# Patient Record
Sex: MALE | Race: WHITE | Employment: OTHER | URBAN - METROPOLITAN AREA
[De-identification: names, ages, dates, MRNs, and addresses within clinical notes are randomized per-mention and may not be internally consistent; named-entity substitution may affect disease eponyms.]

---

## 2017-01-17 DIAGNOSIS — Z96.649 MECHANICAL COMPLICATION OF PROSTHETIC HIP IMPLANT (H): Primary | ICD-10-CM

## 2017-01-17 DIAGNOSIS — T84.098A MECHANICAL COMPLICATION OF PROSTHETIC HIP IMPLANT (H): Primary | ICD-10-CM

## 2017-01-19 ENCOUNTER — HOSPITAL ENCOUNTER (OUTPATIENT)
Dept: CT IMAGING | Facility: CLINIC | Age: 57
Discharge: HOME | End: 2017-01-19
Attending: ORTHOPAEDIC SURGERY | Admitting: ORTHOPAEDIC SURGERY
Payer: MEDICARE

## 2017-01-19 DIAGNOSIS — T84.098A MECHANICAL COMPLICATION OF PROSTHETIC HIP IMPLANT (H): ICD-10-CM

## 2017-01-19 DIAGNOSIS — Z96.649 MECHANICAL COMPLICATION OF PROSTHETIC HIP IMPLANT (H): ICD-10-CM

## 2017-01-19 PROCEDURE — 72192 CT PELVIS W/O DYE: CPT | Mod: TC

## 2017-03-03 ENCOUNTER — TELEPHONE (OUTPATIENT)
Dept: FAMILY MEDICINE | Facility: CLINIC | Age: 57
End: 2017-03-03

## 2017-03-03 NOTE — TELEPHONE ENCOUNTER
Reason for call:  Form  Reason for Call:  Form, our goal is to have forms completed with 72 hours, however, some forms may require a visit or additional information.    Type of letter, form or note:  medical    Who is the form from?: *[facility] diabetic form    Where did the form come from: form was faxed in    What clinic location was the form placed at?: [facility] - [phone]    Where the form was placed:  in box     What number is listed as a contact on the form?: [phone]       Additional comments: Fax to [phone]    Call taken on 11/8/2016 at 3:08 PM by Christy Harkins

## 2017-03-17 ASSESSMENT — HOOS S4: HOW SEVERE IS YOUR HIP JOINT STIFFNESS AFTER FIRST WAKENING IN THE MORNING?: MILD

## 2017-03-17 ASSESSMENT — ACTIVITIES OF DAILY LIVING (ADL)
ADL_SUM: 24
ADL_MEAN: 1.41
ADL_SUBSCALE_SCORE: 64.7

## 2017-03-27 ENCOUNTER — OFFICE VISIT (OUTPATIENT)
Dept: ORTHOPEDICS | Facility: CLINIC | Age: 57
End: 2017-03-27

## 2017-03-27 VITALS — HEIGHT: 74 IN | BODY MASS INDEX: 23.59 KG/M2 | WEIGHT: 183.8 LBS

## 2017-03-27 DIAGNOSIS — S32.811G: Primary | ICD-10-CM

## 2017-03-27 NOTE — PROGRESS NOTES
Dx:   1. Chondrosarcoma left pelvis  2. Multiple Sclerosis  3. DM     Surgeries:   1. 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 Tavo Gomez.   2. March 12, 2008, removal of fractured left pelvis allograft and reimplantation of second left hemipelvis allograft with internal fixation and revision left total hip arthroplasty, Dr Gage  3. 9/25/2015, Revision failed L hemipelvis allograft/MINNIE composite to custom R hemipelvis and MINNIE prosthesis (Biomet pelvis, vit E poly liner, CoChr 36mm +9 head) with Sacral ala and pubic symphysis fixation.     HISTORY OF PRESENT ILLNESS:  Mr. Vaz returns 1.5 years after undergoing revision of his failed left hemipelvis allograft total hip composite to a custom right hemipelvis, combined with total hip prosthesis. Continues to be TTWB with crutches. He obtained repeat CT scans of the pelvis in Sept 2017 and January 2017 which revealed loosening of the transiliac screws, broken pubic screws and interval disruption of the pubic footing component.  He has been minimally active, mainly at home but does go out to do his own grocery shopping. Uses a wheelchair for many activities. Notes some posterior stiffness.  He also notes movement anteriorly, but denies any pain.   He denies any new n/t/weakness. He presents today to discuss CT scan and plans going forward, including surgical management.       PHYSICAL EXAMINATION:    His extensile incision extending from the sacral ala to the pubic symphysis has completely healed.  Increased soft tissue bulk noted along the supra-iliac area.  There is no evidence of drainage or erythema, no ongoing edema.  He tolerates hip ROM without pain. We are able to reproduce a clicking/movement sensation over the left pubis with hip manipulation. Hip ROM 0-100. IR 20, ER 30. Distal sensation decreased compared to contralateral side in stocking glove distribution. Palpable PT pulse. Intact strength in ehl/fhl/ta/gsc.       Radiographs were obtained today demonstrating increased radiolucency along the transiliac screws, particularly lateral to the right SI joint.  Interval backing out of the pubic fixation screws, which is broken.  Lucency along the sacral bone-ilium prosthesis interface.      CT scans show significant hardware artifact.  Lucency noted along transiliac screws and sacral-ilium interface.     Assessment/Plan:  Aden Vaz is a 55 year old male 1.5 year s/p massive reconstruction of the left hemipelvis using a custom device secondary to prior failed allograft reconstructions of chondrosarcoma resection. Now with broken screws concerning for ongoing motion at his implant/bone interface.    We discussed at length with Mr Vaz regarding our recommendations to go forward with operative management prior to worsening, catastrophic failure of implant.  We discussed risks, benefits, alternatives to surgical management included but not limited to infection, blood loss, damage to surrounding anatomic structures, and complications that could result in subsequent amputation, and patient is in agreement with proceeding with surgery.  Surgery would be around mid-May once the custom prosthesis is completed.  Surgical intervention would consist of the followin.  Replacement of three transiliac screws to upsized, solid screws   2.  Replace pubic footing component with extension across the pubic symphysis   3.  Access to the pubic footing component fixation/screws requires a hip dislcoation   4.  Possible scar revision with debulking of the supra-iliac soft tissue, if possible    -continue with crutches and TTWB  -Minimize activities and avoid activity/motion that reproduces his sensation of movement within the pelvis  -will call to arrange specifics regarding OR scheduling    Ras Sagastume MD  Orthopaedic Surgery PGY-4  [phone]      Total Time = 25 min, 50% of which was spent in counseling and coordination of  care as documented above.

## 2017-03-27 NOTE — LETTER
3/27/2017       RE: Aden Vaz  [address]   [place] [address]     Dear Colleague,    Thank you for referring your patient, Aden Vaz, to the [facility] at [facility]. Please see a copy of my visit note below.    Dx:   1. Chondrosarcoma left pelvis  2. Multiple Sclerosis  3. DM     Surgeries:   1. 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 Tavo Gomez.   2. March 12, 2008, removal of fractured left pelvis allograft and reimplantation of second left hemipelvis allograft with internal fixation and revision left total hip arthroplasty, Dr Gage  3. 9/25/2015, Revision failed L hemipelvis allograft/MINNIE composite to custom R hemipelvis and MINNIE prosthesis (Biomet pelvis, vit E poly liner, CoChr 36mm +9 head) with Sacral ala and pubic symphysis fixation.     HISTORY OF PRESENT ILLNESS:  Mr. Vaz returns 1.5 years after undergoing revision of his failed left hemipelvis allograft total hip composite to a custom right hemipelvis, combined with total hip prosthesis. Continues to be TTWB with crutches. He obtained repeat CT scans of the pelvis in Sept 2017 and January 2017 which revealed loosening of the transiliac screws, broken pubic screws and interval disruption of the pubic footing component.  He has been minimally active, mainly at home but does go out to do his own grocery shopping. Uses a wheelchair for many activities. Notes some posterior stiffness.  He also notes movement anteriorly, but denies any pain.   He denies any new n/t/weakness. He presents today to discuss CT scan and plans going forward, including surgical management.       PHYSICAL EXAMINATION:    His extensile incision extending from the sacral ala to the pubic symphysis has completely healed.  Increased soft tissue bulk noted along the supra-iliac area.  There is no evidence of drainage or erythema, no ongoing edema.  He  tolerates hip ROM without pain. We are able to reproduce a clicking/movement sensation over the left pubis with hip manipulation. Hip ROM 0-100. IR 20, ER 30. Distal sensation decreased compared to contralateral side in stocking glove distribution. Palpable PT pulse. Intact strength in ehl/fhl/ta/gsc.      Radiographs were obtained today demonstrating increased radiolucency along the transiliac screws, particularly lateral to the right SI joint.  Interval backing out of the pubic fixation screws, which is broken.  Lucency along the sacral bone-ilium prosthesis interface.      CT scans show significant hardware artifact.  Lucency noted along transiliac screws and sacral-ilium interface.     Assessment/Plan:  Aden Vaz is a 55 year old male 1.5 year s/p massive reconstruction of the left hemipelvis using a custom device secondary to prior failed allograft reconstructions of chondrosarcoma resection. Now with broken screws concerning for ongoing motion at his implant/bone interface.    We discussed at length with Mr Vaz regarding our recommendations to go forward with operative management prior to worsening, catastrophic failure of implant.  We discussed risks, benefits, alternatives to surgical management included but not limited to infection, blood loss, damage to surrounding anatomic structures, and complications that could result in subsequent amputation, and patient is in agreement with proceeding with surgery.  Surgery would be around mid-May once the custom prosthesis is completed.  Surgical intervention would consist of the followin.  Replacement of three transiliac screws to upsized, solid screws   2.  Replace pubic footing component with extension across the pubic symphysis   3.  Access to the pubic footing component fixation/screws requires a hip dislcoation   4.  Possible scar revision with debulking of the supra-iliac soft tissue, if possible    -continue with crutches and TTWB  -Minimize  activities and avoid activity/motion that reproduces his sensation of movement within the pelvis  -will call to arrange specifics regarding OR scheduling    Ras Sagastume MD  Orthopaedic Surgery PGY-4  [phone]      Total Time = 25 min, 50% of which was spent in counseling and coordination of care as documented above.

## 2017-03-27 NOTE — NURSING NOTE
"Reason For Visit:   Chief Complaint   Patient presents with     Results     Pt is here today to F/u on CT Scan from Jan. of his Pelvis and Same day XR of his Pelivs.            Ht 1.886 m (6' 2.25\")  Wt 83.4 kg (183 lb 12.8 oz)  BMI 23.44 kg/m2              Current Outpatient Prescriptions   Medication     insulin glargine (LANTUS SOLOSTAR) 100 UNIT/ML injection     simvastatin (ZOCOR) 80 MG tablet     amLODIPine (NORVASC) 5 MG tablet     insulin lispro (HUMALOG) 100 UNIT/ML VIAL     B-D U/F insulin pen needle     blood glucose monitoring (NO BRAND SPECIFIED) test strip     order for DME     ACE NOT PRESCRIBED, INTENTIONAL,     amoxicillin (AMOXIL) 500 MG tablet     ORDER FOR DME     aspirin 81 MG tablet     ORDER FOR DME     CALCIUM + D 600-200 MG-UNIT PO TABS     VITAMIN D3 2000 UNIT PO CAPS     No current facility-administered medications for this visit.        Allergies   Allergen Reactions     No Known Drug Allergies        Christy Badillo C.M.A.             "

## 2017-03-27 NOTE — MR AVS SNAPSHOT
After Visit Summary   3/27/2017    Aden Vaz    MRN: [id number]           Patient Information     Date Of Birth          1960        Visit Information        Provider Department      3/27/2017 2:15 PM Payam Gage MD [facility]        Today's Diagnoses     Multiple closed fractures of pelvis with unstable disruption of pelvic [place] with delayed healing, subsequent encounter    -  1       Follow-ups after your visit        Your next 10 appointments already scheduled     Sep 05, 2017  1:45 PM CDT   (Arrive by 1:30 PM)   MR BRAIN W/O & W CONTRAST with [facility] [facility] ([facility])    [address] [address] [phone]           Take your medicines as usual, unless your doctor tells you not to. Bring a list of your current medicines to your exam (including vitamins, minerals and over-the-counter drugs).  You will be given intravenous contrast for this exam. To prepare:   The day before your exam, drink extra fluids at least six 8-ounce glasses (unless your doctor tells you to restrict your fluids).   Have a blood test (creatinine test) within 30 days of your exam. Go to your clinic or Diagnostic Imaging Department for this test.  The MRI machine uses a strong magnet. Please wear clothes without metal (snaps, zippers). A sweatsuit works well, or we may give you a hospital gown.  Please remove any body piercings and hair extensions before you arrive. You will also remove watches, jewelry, hairpins, wallets, dentures, partial dental plates and hearing aids. You may wear contact lenses, and you may be able to wear your rings. We have a safe place to keep your personal items, but it is safer to leave them at home.   **IMPORTANT** THE INSTRUCTIONS BELOW ARE ONLY FOR THOSE PATIENTS WHO HAVE BEEN TOLD THEY WILL RECEIVE SEDATION OR GENERAL ANESTHESIA DURING THEIR MRI PROCEDURE:  IF YOU WILL RECEIVE  SEDATION (take medicine to help you relax during your exam):   You must get the medicine from your doctor before you arrive. Bring the medicine to the exam. Do not take it at home.   Arrive one hour early. Bring someone who can take you home after the test. Your medicine will make you sleepy. After the exam, you may not drive, take a bus or take a taxi by yourself.   No eating 8 hours before your exam. You may have clear liquids up until 4 hours before your exam. (Clear liquids include water, clear tea, black coffee and fruit juice without pulp.)  IF YOU WILL RECEIVE ANESTHESIA (be asleep for your exam):   Arrive 1 1/2 hours early. Bring someone who can take you home after the test. You may not drive, take a bus or take a taxi by yourself.   No eating 8 hours before your exam. You may have clear liquids up until 4 hours before your exam. (Clear liquids include water, clear tea, black coffee and fruit juice without pulp.)  Please call the Imaging Department at your exam site with any questions.            Sep 05, 2017  3:00 PM CDT   (Arrive by 2:45 PM)   Return Multiple Sclerosis with Isai Kathleen MD   [facility] Multiple [facility]clerosis ([facility])    [address] [place] [address] [phone]              Who to contact     Please call your clinic at [phone] to:    Ask questions about your health    Make or cancel appointments    Discuss your medicines    Learn about your test results    Speak to your doctor   If you have compliments or concerns about an experience at your clinic, or if you wish to file a complaint, please contact [facility] Patient Relations at [phone] or email us at [email or web address]         Additional Information About Your Visit        [employer] Information     [employer] gives you secure access to your electronic health record. If you see a primary care provider, you can also send messages  "to your care team and make appointments. If you have questions, please call your primary care clinic.  If you do not have a primary care provider, please call [phone] and they will assist you.      [employer] is an electronic gateway that provides easy, online access to your medical records. With [employer], you can request a clinic appointment, read your test results, renew a prescription or communicate with your care team.     To access your existing account, please contact your [facility] or call [phone] for assistance.        Care EveryWhere ID     This is your Care EveryWhere ID. This could be used by other organizations to access your [facility] medical records  [id number]        Your Vitals Were     Height BMI (Body Mass Index)                1.886 m (6' 2.25\") 23.44 kg/m2           Blood Pressure from Last 3 Encounters:   10/04/16 108/68   09/06/16 143/86   08/05/16 98/60    Weight from Last 3 Encounters:   03/27/17 83.4 kg (183 lb 12.8 oz)   10/04/16 85.3 kg (188 lb 1.6 oz)   09/26/16 86.7 kg (191 lb 1.6 oz)               Primary Care Provider Office Phone # Fax #    Melita Painter [phone]       [facility] [address] [facility] [address]        Thank you!     Thank you for choosing [facility]  for your care. Our goal is always to provide you with excellent care. Hearing back from our patients is one way we can continue to improve our services. Please take a few minutes to complete the written survey that you may receive in the mail after your visit with us. Thank you!             Your Updated Medication List - Protect others around you: Learn how to safely use, store and throw away your medicines at www.disposemymeds.org.          This list is accurate as of: 3/27/17 11:59 PM.  Always use your most recent med list.                   Brand Name Dispense Instructions for use    ACE NOT PRESCRIBED (INTENTIONAL)     0 each "    ACE Inhibitor not prescribed due to Other:       amLODIPine 5 MG tablet    NORVASC    90 tablet    Take 1 tablet (5 mg) by mouth every morning       amoxicillin 500 MG tablet    AMOXIL    8 tablet    Take 4 tabs ( 2 grams) by mouth 1 hour before dental work or any invasive procedure       aspirin 81 MG tablet      Take  by mouth daily.       B-D U/F 31G X 5 MM   Generic drug:  insulin pen needle     300 each    USE 4 DAILY WITH NOVOLOG AND LANTUS PENS AS DIRECTED       blood glucose monitoring test strip    no brand specified    4 Box    1 strip by In Vitro route 4 times daily (before meals and nightly)       calcium + D 600-200 MG-UNIT Tabs   Generic drug:  calcium carbonate-vitamin D      1 Tablet daily       insulin glargine 100 UNIT/ML injection    LANTUS SOLOSTAR    15 mL    INJECT 9 UNITS AT BEDTIME       insulin lispro 100 UNIT/ML injection    HumaLOG    3 Month    Inject  Subcutaneous 3 times daily (before meals). 10 units before breakfast, 10 units before lunch, 10 units before dinner       * order for DME     1 each    Wheelchair seat cushion       * order for DME     1 Units    Crutch, forearm       order for DME     400 strip    Equipment being ordered: Contour test strips for home glucometer for four times daily testing       simvastatin 80 MG tablet    ZOCOR    45 tablet    TAKE ONE-HALF TABLET BY MOUTH DAILY       vitamin D3 2000 UNITS Caps      1 CAPSULE DAILY       * Notice:  This list has 2 medication(s) that are the same as other medications prescribed for you. Read the directions carefully, and ask your doctor or other care provider to review them with you.

## 2017-03-31 DIAGNOSIS — Z96.649 H/O TOTAL HIP ARTHROPLASTY: Primary | ICD-10-CM

## 2017-03-31 RX ORDER — AMOXICILLIN 500 MG/1
TABLET, FILM COATED ORAL
Qty: 4 TABLET | Refills: 4 | Status: SHIPPED | OUTPATIENT
Start: 2017-03-31 | End: 2017-04-11

## 2017-04-06 NOTE — PROGRESS NOTES
"  SUBJECTIVE:                                                    Aden Vaz is a 56 year old male who presents to clinic today for the following health issues:    FOLLOW UP OF PELVIC SURGERY patient wants opinion on surgery. Had reconstruction of the left hemipelvis last year. A screw is coming loose and is not stable. He has met with his surgeon about a new surgery. He is not sure he would like to go through this at this point. He is concerned that he may be right back at this same point in another year. He feels he is getting around fine and concerned that he may not be if he has another surgery.     MULTIPLE SCLEROSIS has been having flares.  Patient thinks Prevnar 13 gave him an MS flare. He hadn't had a flare for years. Will be seeing his neurologist about new medication for MS. He is feeling some better now. He is very fatigued and wondering if it is due to recent flare. He denies any trouble with appetite. He has no nausea or vomiting. No cough or fever.     Reports that he consider moving to a \"right to die\" state because of his health problems. He denies that he is suicidal and has no intention to hurt himself. He does think at times about what his future holds with his issues.       Diabetes Follow-up    Patient is checking blood sugars: twice daily.    Blood sugar testing frequency justification: Patient modifying lifestyle changes (diet, exercise) with blood sugars  Results are as follows:         am - 130-140         suppertime -     Diabetic concerns: None     Symptoms of hypoglycemia (low blood sugar): shaky, disoriented, sweaty, concentrating.     Paresthesias (numbness or burning in feet) or sores: Yes Left foot- Could be from MS.     Date of last diabetic eye exam: 7/26/2016     Hyperlipidemia Follow-Up      Rate your low fat/cholesterol diet?: good    Taking statin?  Yes, no muscle aches from statin    Other lipid medications/supplements?:  none     Hypertension " Follow-up      Outpatient blood pressures are not being checked.    Low Salt Diet: no added salt         Amount of exercise or physical activity: 5-7 days/week for an average of 30-45 minutes, exercises for his legs, feet, arms, and hands.    Problems taking medications regularly: No    Medication side effects: none    Diet: diabetic          Problem list and histories reviewed & adjusted, as indicated.  Additional history: as documented    Current Outpatient Prescriptions   Medication Sig Dispense Refill     insulin glargine (LANTUS SOLOSTAR) 100 UNIT/ML injection INJECT 9 UNITS AT BEDTIME 15 mL 1     simvastatin (ZOCOR) 80 MG tablet TAKE ONE-HALF TABLET BY MOUTH DAILY 45 tablet 3     amLODIPine (NORVASC) 5 MG tablet Take 1 tablet (5 mg) by mouth every morning 90 tablet 1     insulin lispro (HUMALOG) 100 UNIT/ML VIAL Inject  Subcutaneous 3 times daily (before meals). 10 units before breakfast, 10 units before lunch, 10 units before dinner 3 Month 1     B-D U/F insulin pen needle USE 4 DAILY WITH NOVOLOG AND LANTUS PENS AS DIRECTED 300 each 5     blood glucose monitoring (NO BRAND SPECIFIED) test strip 1 strip by In Vitro route 4 times daily (before meals and nightly) 4 Box 3     aspirin 81 MG tablet Take  by mouth daily.  3     CALCIUM + D 600-200 MG-UNIT PO TABS 1 Tablet daily       VITAMIN D3 2000 UNIT PO CAPS 1 CAPSULE DAILY       ACE NOT PRESCRIBED, INTENTIONAL, ACE Inhibitor not prescribed due to Other: (Patient not taking: Reported on 4/11/2017) 0 each 0     ORDER FOR DME Crutch, forearm (Patient not taking: Reported on 4/11/2017) 1 Units 0     ORDER FOR DME Wheelchair seat cushion (Patient not taking: Reported on 4/11/2017) 1 each 0     Allergies   Allergen Reactions     No Known Drug Allergies        Reviewed and updated as needed this visit by clinical staff       Reviewed and updated as needed this visit by Provider         ROS:  C: NEGATIVE for fever, chills, change in weight  I: NEGATIVE for worrisome  "rashes, moles or lesions  E/M: NEGATIVE for ear, mouth and throat problems  R: NEGATIVE for significant cough or SOB  CV: NEGATIVE for chest pain, palpitations or peripheral edema  GI: NEGATIVE for nausea, abdominal pain, heartburn, or change in bowel habits  MUSCULOSKELETAL:as above  NEURO: as above.     OBJECTIVE:                                                    /72 (BP Location: Left arm, Patient Position: Chair, Cuff Size: Adult Regular)  Pulse 78  Temp 98  F (36.7  C) (Temporal)  Resp 16  Ht 6' 2\" (1.88 m)  Wt 191 lb 9.6 oz (86.9 kg)  BMI 24.6 kg/m2  Body mass index is 24.6 kg/(m^2).  GENERAL APPEARANCE: alert and no distress  HENT: mouth without ulcers or lesions  NECK: no adenopathy, no asymmetry, masses, or scars and thyroid normal to palpation  RESP: lungs clear to auscultation - no rales, rhonchi or wheezes  CV: regular rates and rhythm, normal S1 S2, no S3 or S4 and no murmur, click or rub  MS: no lower extremity edema.     Diagnostic Test Results:  pending     ASSESSMENT/PLAN:                                                          BMI:   Estimated body mass index is 24.6 kg/(m^2) as calculated from the following:    Height as of this encounter: 6' 2\" (1.88 m).    Weight as of this encounter: 191 lb 9.6 oz (86.9 kg).         1. Type 2 diabetes mellitus without complication, with long-term current use of insulin (H)  Patient is due for labs today.   Will notify of results.   Awaiting results. Dose adjustment as needed.    - Hemoglobin A1c  - Albumin Random Urine Quantitative    2. Multiple sclerosis (H)  Patient with recent flare. He reports that he is doing better at this time.   He still has fatigue and wondering if this is from his flare.   See below.   He will be following up with his neurology about any treatment options for his MS and will have MRI at next visit.     3. Chondrosarcoma (H)  4. Mechanical complication of prosthetic hip implant, subsequent encounter  See above.   He " doesn't wish to have surgery at this time and that seems reasonable.   Recommend that he discuss with Dr. Ggae.   He will consider.   Will forward message to Dr. Gage regarding this.     5. Fatigue, unspecified type  Patient with fatigue and recent MS flare.   Could be related to this but will also check other labs.   Will notify of results.   - Comprehensive metabolic panel  - CBC with platelets differential  - TSH with free T4 reflex    6. HTN, goal below 130/80  Doing well. Well controlled. Tolerating medication.  No change in plan.      7. Hyperlipidemia LDL goal <100  Doing well. Well controlled. Tolerating medication.  No change in plan.            DONALD CALDERON MD, MD  [facility]

## 2017-04-11 ENCOUNTER — OFFICE VISIT (OUTPATIENT)
Dept: FAMILY MEDICINE | Facility: CLINIC | Age: 57
End: 2017-04-11
Payer: COMMERCIAL

## 2017-04-11 VITALS
DIASTOLIC BLOOD PRESSURE: 72 MMHG | RESPIRATION RATE: 16 BRPM | BODY MASS INDEX: 24.59 KG/M2 | HEART RATE: 78 BPM | TEMPERATURE: 98 F | HEIGHT: 74 IN | WEIGHT: 191.6 LBS | SYSTOLIC BLOOD PRESSURE: 112 MMHG

## 2017-04-11 DIAGNOSIS — G35 MULTIPLE SCLEROSIS (H): ICD-10-CM

## 2017-04-11 DIAGNOSIS — Z79.4 TYPE 2 DIABETES MELLITUS WITHOUT COMPLICATION, WITH LONG-TERM CURRENT USE OF INSULIN (H): Primary | ICD-10-CM

## 2017-04-11 DIAGNOSIS — Z96.649 MECHANICAL COMPLICATION OF PROSTHETIC HIP IMPLANT, SUBSEQUENT ENCOUNTER: ICD-10-CM

## 2017-04-11 DIAGNOSIS — E11.9 TYPE 2 DIABETES MELLITUS WITHOUT COMPLICATION, WITH LONG-TERM CURRENT USE OF INSULIN (H): Primary | ICD-10-CM

## 2017-04-11 DIAGNOSIS — I10 HTN, GOAL BELOW 130/80: ICD-10-CM

## 2017-04-11 DIAGNOSIS — R53.83 FATIGUE, UNSPECIFIED TYPE: ICD-10-CM

## 2017-04-11 DIAGNOSIS — E78.5 HYPERLIPIDEMIA LDL GOAL <100: ICD-10-CM

## 2017-04-11 DIAGNOSIS — C41.9 CHONDROSARCOMA (H): ICD-10-CM

## 2017-04-11 DIAGNOSIS — T84.098D MECHANICAL COMPLICATION OF PROSTHETIC HIP IMPLANT, SUBSEQUENT ENCOUNTER: ICD-10-CM

## 2017-04-11 LAB
BASOPHILS # BLD AUTO: 0 10E9/L (ref 0–0.2)
BASOPHILS NFR BLD AUTO: 0.4 %
DIFFERENTIAL METHOD BLD: ABNORMAL
EOSINOPHIL # BLD AUTO: 0 10E9/L (ref 0–0.7)
EOSINOPHIL NFR BLD AUTO: 0.6 %
ERYTHROCYTE [DISTWIDTH] IN BLOOD BY AUTOMATED COUNT: 12.7 % (ref 10–15)
HBA1C MFR BLD: 6.9 % (ref 4.3–6)
HCT VFR BLD AUTO: 39.3 % (ref 40–53)
HGB BLD-MCNC: 13.3 G/DL (ref 13.3–17.7)
LYMPHOCYTES # BLD AUTO: 1.2 10E9/L (ref 0.8–5.3)
LYMPHOCYTES NFR BLD AUTO: 25.2 %
MCH RBC QN AUTO: 29.6 PG (ref 26.5–33)
MCHC RBC AUTO-ENTMCNC: 33.8 G/DL (ref 31.5–36.5)
MCV RBC AUTO: 88 FL (ref 78–100)
MONOCYTES # BLD AUTO: 0.5 10E9/L (ref 0–1.3)
MONOCYTES NFR BLD AUTO: 9.8 %
NEUTROPHILS # BLD AUTO: 3.1 10E9/L (ref 1.6–8.3)
NEUTROPHILS NFR BLD AUTO: 64 %
PLATELET # BLD AUTO: 250 10E9/L (ref 150–450)
RBC # BLD AUTO: 4.49 10E12/L (ref 4.4–5.9)
WBC # BLD AUTO: 4.8 10E9/L (ref 4–11)

## 2017-04-11 PROCEDURE — 82043 UR ALBUMIN QUANTITATIVE: CPT | Performed by: FAMILY MEDICINE

## 2017-04-11 PROCEDURE — 80053 COMPREHEN METABOLIC PANEL: CPT | Performed by: FAMILY MEDICINE

## 2017-04-11 PROCEDURE — 85025 COMPLETE CBC W/AUTO DIFF WBC: CPT | Performed by: FAMILY MEDICINE

## 2017-04-11 PROCEDURE — 84443 ASSAY THYROID STIM HORMONE: CPT | Performed by: FAMILY MEDICINE

## 2017-04-11 PROCEDURE — 36415 COLL VENOUS BLD VENIPUNCTURE: CPT | Performed by: FAMILY MEDICINE

## 2017-04-11 PROCEDURE — 83036 HEMOGLOBIN GLYCOSYLATED A1C: CPT | Performed by: FAMILY MEDICINE

## 2017-04-11 PROCEDURE — 99214 OFFICE O/P EST MOD 30 MIN: CPT | Performed by: FAMILY MEDICINE

## 2017-04-11 ASSESSMENT — PAIN SCALES - GENERAL: PAINLEVEL: MILD PAIN (3)

## 2017-04-11 NOTE — NURSING NOTE
"Chief Complaint   Patient presents with     Diabetes       Initial /72 (BP Location: Left arm, Patient Position: Chair, Cuff Size: Adult Regular)  Pulse 78  Temp 98  F (36.7  C) (Temporal)  Resp 16  Ht 6' 2\" (1.88 m)  Wt 191 lb 9.6 oz (86.9 kg)  BMI 24.6 kg/m2 Estimated body mass index is 24.6 kg/(m^2) as calculated from the following:    Height as of this encounter: 6' 2\" (1.88 m).    Weight as of this encounter: 191 lb 9.6 oz (86.9 kg).  Medication Reconciliation: complete    "

## 2017-04-11 NOTE — Clinical Note
Ulysses Samaniego doesn't want to go through with another pelvic surgery at this point. I recommended that he speak with you but he asked that I send a message to you. He doesn't feel he is prepared to go through that any time soon.  Please let me know what questions you have.  -Melita

## 2017-04-11 NOTE — MR AVS SNAPSHOT
After Visit Summary   4/11/2017    Aden Vaz    MRN: [id number]           Patient Information     Date Of Birth          1960        Visit Information        Provider Department      4/11/2017 3:20 PM Melita Painter MD [facility]        Today's Diagnoses     Multiple sclerosis (H)    -  1    Type 2 diabetes mellitus without complication, with long-term current use of insulin (H)        Chondrosarcoma (H)        Fatigue, unspecified type        Mechanical complication of prosthetic hip implant, subsequent encounter        HTN, goal below 130/80        Hyperlipidemia LDL goal <100           Follow-ups after your visit        Your next 10 appointments already scheduled     Sep 05, 2017  1:45 PM CDT   (Arrive by 1:30 PM)   MR BRAIN W/O & W CONTRAST with [address] [facility] ([facility])    [address] [place] [id number] [phone]           Take your medicines as usual, unless your doctor tells you not to. Bring a list of your current medicines to your exam (including vitamins, minerals and over-the-counter drugs).  You will be given intravenous contrast for this exam. To prepare:   The day before your exam, drink extra fluids at least six 8-ounce glasses (unless your doctor tells you to restrict your fluids).   Have a blood test (creatinine test) within 30 days of your exam. Go to your clinic or Diagnostic Imaging Department for this test.  The MRI machine uses a strong magnet. Please wear clothes without metal (snaps, zippers). A sweatsuit works well, or we may give you a hospital gown.  Please remove any body piercings and hair extensions before you arrive. You will also remove watches, jewelry, hairpins, wallets, dentures, partial dental plates and hearing aids. You may wear contact lenses, and you may be able to wear your rings. We have a safe place to keep your personal items, but it is safer to leave  them at home.   **IMPORTANT** THE INSTRUCTIONS BELOW ARE ONLY FOR THOSE PATIENTS WHO HAVE BEEN TOLD THEY WILL RECEIVE SEDATION OR GENERAL ANESTHESIA DURING THEIR MRI PROCEDURE:  IF YOU WILL RECEIVE SEDATION (take medicine to help you relax during your exam):   You must get the medicine from your doctor before you arrive. Bring the medicine to the exam. Do not take it at home.   Arrive one hour early. Bring someone who can take you home after the test. Your medicine will make you sleepy. After the exam, you may not drive, take a bus or take a taxi by yourself.   No eating 8 hours before your exam. You may have clear liquids up until 4 hours before your exam. (Clear liquids include water, clear tea, black coffee and fruit juice without pulp.)  IF YOU WILL RECEIVE ANESTHESIA (be asleep for your exam):   Arrive 1 1/2 hours early. Bring someone who can take you home after the test. You may not drive, take a bus or take a taxi by yourself.   No eating 8 hours before your exam. You may have clear liquids up until 4 hours before your exam. (Clear liquids include water, clear tea, black coffee and fruit juice without pulp.)  Please call the Imaging Department at your exam site with any questions.            Sep 05, 2017  3:00 PM CDT   (Arrive by 2:45 PM)   Return Multiple Sclerosis with Iasi Kathleen MD   [facility] ([facility])    [address] [place] [id number] [phone]              Who to contact     If you have questions or need follow up information about today's clinic visit or your schedule please contact [facility] directly at [phone].  Normal or non-critical lab and imaging results will be communicated to you by MyChart, letter or phone within 4 business days after the clinic has received the results. If you do not hear from us within 7 days, please contact the clinic through MyChart or phone. If you have a critical  "or abnormal lab result, we will notify you by phone as soon as possible.  Submit refill requests through [employer] or call your pharmacy and they will forward the refill request to us. Please allow 3 business days for your refill to be completed.          Additional Information About Your Visit        [email or web address] Information     [employer] gives you secure access to your electronic health record. If you see a primary care provider, you can also send messages to your care team and make appointments. If you have questions, please call your primary care clinic.  If you do not have a primary care provider, please call [phone] and they will assist you.        Care EveryWhere ID     This is your Care EveryWhere ID. This could be used by other organizations to access your [place] medical records  [id number]        Your Vitals Were     Pulse Temperature Respirations Height BMI (Body Mass Index)       78 98  F (36.7  C) (Temporal) 16 6' 2\" (1.88 m) 24.6 kg/m2        Blood Pressure from Last 3 Encounters:   04/11/17 112/72   10/04/16 108/68   09/06/16 143/86    Weight from Last 3 Encounters:   04/11/17 191 lb 9.6 oz (86.9 kg)   03/27/17 183 lb 12.8 oz (83.4 kg)   10/04/16 188 lb 1.6 oz (85.3 kg)              We Performed the Following     Albumin Random Urine Quantitative     CBC with platelets differential     Comprehensive metabolic panel     Hemoglobin A1c     TSH with free T4 reflex          Today's Medication Changes          These changes are accurate as of: 4/11/17  4:18 PM.  If you have any questions, ask your nurse or doctor.               Stop taking these medicines if you haven't already. Please contact your care team if you have questions.     amoxicillin 500 MG tablet   Commonly known as:  AMOXIL   Stopped by:  Melita Painter MD                    Primary Care Provider Office Phone # Fax #    Melita Painter [phone]       [facility] [address] [facility] [address]      "   Thank you!     Thank you for choosing [facility]  for your care. Our goal is always to provide you with excellent care. Hearing back from our patients is one way we can continue to improve our services. Please take a few minutes to complete the written survey that you may receive in the mail after your visit with us. Thank you!             Your Updated Medication List - Protect others around you: Learn how to safely use, store and throw away your medicines at www.disposemymeds.org.          This list is accurate as of: 4/11/17  4:18 PM.  Always use your most recent med list.                   Brand Name Dispense Instructions for use    ACE NOT PRESCRIBED (INTENTIONAL)     0 each    ACE Inhibitor not prescribed due to Other:       amLODIPine 5 MG tablet    NORVASC    90 tablet    Take 1 tablet (5 mg) by mouth every morning       aspirin 81 MG tablet      Take  by mouth daily.       B-D U/F 31G X 5 MM   Generic drug:  insulin pen needle     300 each    USE 4 DAILY WITH NOVOLOG AND LANTUS PENS AS DIRECTED       blood glucose monitoring test strip    no brand specified    4 Box    1 strip by In Vitro route 4 times daily (before meals and nightly)       calcium + D 600-200 MG-UNIT Tabs   Generic drug:  calcium carbonate-vitamin D      1 Tablet daily       insulin glargine 100 UNIT/ML injection    LANTUS SOLOSTAR    15 mL    INJECT 9 UNITS AT BEDTIME       insulin lispro 100 UNIT/ML injection    HumaLOG    3 Month    Inject  Subcutaneous 3 times daily (before meals). 10 units before breakfast, 10 units before lunch, 10 units before dinner       * order for DME     1 each    Wheelchair seat cushion       * order for DME     1 Units    Crutch, forearm       simvastatin 80 MG tablet    ZOCOR    45 tablet    TAKE ONE-HALF TABLET BY MOUTH DAILY       vitamin D3 2000 UNITS Caps      1 CAPSULE DAILY       * Notice:  This list has 2 medication(s) that are the same as other medications prescribed for you. Read the  directions carefully, and ask your doctor or other care provider to review them with you.

## 2017-04-12 LAB
ALBUMIN SERPL-MCNC: 3.8 G/DL (ref 3.4–5)
ALP SERPL-CCNC: 100 U/L (ref 40–150)
ALT SERPL W P-5'-P-CCNC: 50 U/L (ref 0–70)
ANION GAP SERPL CALCULATED.3IONS-SCNC: 7 MMOL/L (ref 3–14)
AST SERPL W P-5'-P-CCNC: 32 U/L (ref 0–45)
BILIRUB SERPL-MCNC: 0.4 MG/DL (ref 0.2–1.3)
BUN SERPL-MCNC: 14 MG/DL (ref 7–30)
CALCIUM SERPL-MCNC: 9.2 MG/DL (ref 8.5–10.1)
CHLORIDE SERPL-SCNC: 102 MMOL/L (ref 94–109)
CO2 SERPL-SCNC: 27 MMOL/L (ref 20–32)
CREAT SERPL-MCNC: 0.96 MG/DL (ref 0.66–1.25)
CREAT UR-MCNC: 19 MG/DL
GFR SERPL CREATININE-BSD FRML MDRD: 81 ML/MIN/1.7M2
GLUCOSE SERPL-MCNC: 183 MG/DL (ref 70–99)
MICROALBUMIN UR-MCNC: <5 MG/L
MICROALBUMIN/CREAT UR: NORMAL MG/G CR (ref 0–17)
POTASSIUM SERPL-SCNC: 4.3 MMOL/L (ref 3.4–5.3)
PROT SERPL-MCNC: 7.8 G/DL (ref 6.8–8.8)
SODIUM SERPL-SCNC: 136 MMOL/L (ref 133–144)
TSH SERPL DL<=0.005 MIU/L-ACNC: 1.6 MU/L (ref 0.4–4)

## 2017-05-18 DIAGNOSIS — I10 HYPERTENSION GOAL BP (BLOOD PRESSURE) < 130/80: ICD-10-CM

## 2017-05-18 NOTE — TELEPHONE ENCOUNTER
amLODIPine (NORVASC) 5 MG tablet      Last Written Prescription Date: 11-21-16  Last Fill Quantity:90, # refills: 1  Last Office Visit with G, P or [facility] prescribing provider: 04-11-17       Potassium   Date Value Ref Range Status   04/11/2017 4.3 3.4 - 5.3 mmol/L Final     Creatinine   Date Value Ref Range Status   04/11/2017 0.96 0.66 - 1.25 mg/dL Final     BP Readings from Last 3 Encounters:   04/11/17 112/72   10/04/16 108/68   09/06/16 143/86

## 2017-05-19 RX ORDER — AMLODIPINE BESYLATE 5 MG/1
5 TABLET ORAL EVERY MORNING
Qty: 90 TABLET | Refills: 3 | Status: SHIPPED | OUTPATIENT
Start: 2017-05-19 | End: 2018-04-24 | Stop reason: ALTCHOICE

## 2017-05-19 NOTE — TELEPHONE ENCOUNTER
Prescription approved per [facility] Refill Protocol.  Katina Ladd, RN, BSN

## 2017-06-23 NOTE — PROGRESS NOTES
SUBJECTIVE:                                                    Aden Vaz is a 56 year old male who presents to clinic today for the following health issues:      Concern - Mole Evaluation  Onset: Noticed a couple months ago    Description:   Small mole Left shoulder. Light brown in color. Also noticed some bumps that are around it. Those come an go.     Intensity: mild, Sometimes a burning sensation. Not sure if it is his MS Though    Progression of Symptoms:  same    Accompanying Signs & Symptoms:  No    Previous history of similar problem:   No    Precipitating factors:   Worsened by: No    Alleviating factors:  Improved by: No    Therapies Tried and outcome: Moisturizer.  No personal history of skin cancer.   Family history - sister.         Problem list and histories reviewed & adjusted, as indicated.  Additional history: as documented    BP Readings from Last 3 Encounters:   06/26/17 138/72   04/11/17 112/72   10/04/16 108/68    Wt Readings from Last 3 Encounters:   06/26/17 187 lb 11.2 oz (85.1 kg)   04/11/17 191 lb 9.6 oz (86.9 kg)   03/27/17 183 lb 12.8 oz (83.4 kg)                    Reviewed and updated as needed this visit by clinical staff  Tobacco  Allergies  Med Hx  Surg Hx  Fam Hx  Soc Hx      Reviewed and updated as needed this visit by Provider         ROS:  Constitutional, HEENT, cardiovascular, pulmonary, gi and gu systems are negative, except as otherwise noted.    OBJECTIVE:     /72  Pulse 80  Temp 98.7  F (37.1  C) (Temporal)  Resp 16  Wt 187 lb 11.2 oz (85.1 kg)  BMI 24.1 kg/m2  Body mass index is 24.1 kg/(m^2).  GENERAL APPEARANCE: healthy, alert and no distress  SKIN: top of left shoulder with small brown papule. Next to that is a small area that is erythematous and slightly scaling.         ASSESSMENT/PLAN:       1. Dermatitis  Consider eczema. Doubt psoriasis.   Recommend treatment with triamcinolone BID.   If fails to improve in the next 2 weeks, then should  evaluate further.   Consider excision.   All questions invited, asked and answered to the patient's apparent satisfaction.  Patient agrees to plan.    - triamcinolone (KENALOG) 0.1 % cream; Apply sparingly to affected area two times daily for 14 days.  Dispense: 15 g; Refill: 0        DONALD CALDERON MD, MD  [facility]

## 2017-06-26 ENCOUNTER — OFFICE VISIT (OUTPATIENT)
Dept: FAMILY MEDICINE | Facility: CLINIC | Age: 57
End: 2017-06-26
Payer: COMMERCIAL

## 2017-06-26 VITALS
RESPIRATION RATE: 16 BRPM | HEART RATE: 80 BPM | BODY MASS INDEX: 24.1 KG/M2 | DIASTOLIC BLOOD PRESSURE: 72 MMHG | WEIGHT: 187.7 LBS | TEMPERATURE: 98.7 F | SYSTOLIC BLOOD PRESSURE: 138 MMHG

## 2017-06-26 DIAGNOSIS — L30.9 DERMATITIS: Primary | ICD-10-CM

## 2017-06-26 PROCEDURE — 99213 OFFICE O/P EST LOW 20 MIN: CPT | Performed by: FAMILY MEDICINE

## 2017-06-26 RX ORDER — TRIAMCINOLONE ACETONIDE 1 MG/G
CREAM TOPICAL
Qty: 15 G | Refills: 0 | Status: SHIPPED | OUTPATIENT
Start: 2017-06-26 | End: 2017-09-25

## 2017-06-26 ASSESSMENT — PAIN SCALES - GENERAL: PAINLEVEL: NO PAIN (0)

## 2017-06-26 NOTE — NURSING NOTE
"Chief Complaint   Patient presents with     Derm Problem     Eval mole on back      Panel Management     UTD       Initial /70  Pulse 80  Temp 98.7  F (37.1  C) (Temporal)  Resp 16  Wt 187 lb 11.2 oz (85.1 kg)  BMI 24.1 kg/m2 Estimated body mass index is 24.1 kg/(m^2) as calculated from the following:    Height as of 4/11/17: 6' 2\" (1.88 m).    Weight as of this encounter: 187 lb 11.2 oz (85.1 kg).  Medication Reconciliation: complete     Fiorella Khan CMA  "

## 2017-06-26 NOTE — MR AVS SNAPSHOT
After Visit Summary   6/26/2017    Aden Vaz    MRN: [id number]           Patient Information     Date Of Birth          1960        Visit Information        Provider Department      6/26/2017 3:00 PM Melita Painter MD [facility]        Today's Diagnoses     Dermatitis    -  1       Follow-ups after your visit        Your next 10 appointments already scheduled     Sep 05, 2017  1:45 PM CDT   (Arrive by 1:30 PM)   MR BRAIN W/O & W CONTRAST with [address] [facility] ([facility])    [address] [place] [id number] [phone]           Take your medicines as usual, unless your doctor tells you not to. Bring a list of your current medicines to your exam (including vitamins, minerals and over-the-counter drugs).  You will be given intravenous contrast for this exam. To prepare:   The day before your exam, drink extra fluids at least six 8-ounce glasses (unless your doctor tells you to restrict your fluids).   Have a blood test (creatinine test) within 30 days of your exam. Go to your clinic or Diagnostic Imaging Department for this test.  The MRI machine uses a strong magnet. Please wear clothes without metal (snaps, zippers). A sweatsuit works well, or we may give you a hospital gown.  Please remove any body piercings and hair extensions before you arrive. You will also remove watches, jewelry, hairpins, wallets, dentures, partial dental plates and hearing aids. You may wear contact lenses, and you may be able to wear your rings. We have a safe place to keep your personal items, but it is safer to leave them at home.   **IMPORTANT** THE INSTRUCTIONS BELOW ARE ONLY FOR THOSE PATIENTS WHO HAVE BEEN TOLD THEY WILL RECEIVE SEDATION OR GENERAL ANESTHESIA DURING THEIR MRI PROCEDURE:  IF YOU WILL RECEIVE SEDATION (take medicine to help you relax during your exam):   You must get the medicine from your doctor before  you arrive. Bring the medicine to the exam. Do not take it at home.   Arrive one hour early. Bring someone who can take you home after the test. Your medicine will make you sleepy. After the exam, you may not drive, take a bus or take a taxi by yourself.   No eating 8 hours before your exam. You may have clear liquids up until 4 hours before your exam. (Clear liquids include water, clear tea, black coffee and fruit juice without pulp.)  IF YOU WILL RECEIVE ANESTHESIA (be asleep for your exam):   Arrive 1 1/2 hours early. Bring someone who can take you home after the test. You may not drive, take a bus or take a taxi by yourself.   No eating 8 hours before your exam. You may have clear liquids up until 4 hours before your exam. (Clear liquids include water, clear tea, black coffee and fruit juice without pulp.)  Please call the Imaging Department at your exam site with any questions.            Sep 05, 2017  3:00 PM CDT   (Arrive by 2:45 PM)   Return Multiple Sclerosis with Isai Kathleen MD   [facility] Multiple [facility] ([facility] [place])    [address] [place] [address]  3rd [facility] [id number] [phone]            Sep 28, 2017  2:30 PM CDT   (Arrive by 2:15 PM)   Return Visit with Payam Gage MD   [facility] ([facility] [place])    [address] [place] [address]  4th [facility] [id number] [phone]              Who to contact     If you have questions or need follow up information about today's clinic visit or your schedule please contact [facility] directly at [phone].  Normal or non-critical lab and imaging results will be communicated to you by MyChart, letter or phone within 4 business days after the clinic has received the results. If you do not hear from us within 7 days, please contact the clinic through MyChart or phone. If you have a critical or abnormal lab result, we will notify you by phone as soon as  possible.  Submit refill requests through [employer]® or call your pharmacy and they will forward the refill request to us. Please allow 3 business days for your refill to be completed.          Additional Information About Your Visit        [employer] Information     [employer]® gives you secure access to your electronic health record. If you see a primary care provider, you can also send messages to your care team and make appointments. If you have questions, please call your primary care clinic.  If you do not have a primary care provider, please call [phone] and they will assist you.        Care EveryWhere ID     This is your Care EveryWhere ID. This could be used by other organizations to access your [facility] medical records  [id number]        Your Vitals Were     Pulse Temperature Respirations BMI (Body Mass Index)          80 98.7  F (37.1  C) (Temporal) 16 24.1 kg/m2         Blood Pressure from Last 3 Encounters:   06/26/17 140/70   04/11/17 112/72   10/04/16 108/68    Weight from Last 3 Encounters:   06/26/17 187 lb 11.2 oz (85.1 kg)   04/11/17 191 lb 9.6 oz (86.9 kg)   03/27/17 183 lb 12.8 oz (83.4 kg)              Today, you had the following     No orders found for display         Today's Medication Changes          These changes are accurate as of: 6/26/17  3:57 PM.  If you have any questions, ask your nurse or doctor.               Start taking these medicines.        Dose/Directions    triamcinolone 0.1 % cream   Commonly known as:  KENALOG   Used for:  Dermatitis   Started by:  Melita Painter MD        Apply sparingly to affected area two times daily for 14 days.   Quantity:  15 g   Refills:  0            Where to get your medicines      These medications were sent to [email or web address] [id number] - [address]  [address] [place] [address]    Hours:  test fax sent successfully 1/20/04  kr Phone:  [phone]     triamcinolone 0.1 % cream                 Primary Care Provider Office Phone # Fax #    Melita Painter [phone]       [facility] [address] [facility] [address]        Equal Access to Services     ELIZABETH ONTIVEROS : Hadii tiarra ku hadgerio Soomaali, waaxda luqadaha, qaybta kaalmada adeegyada, bryanna robertn daryl avelar dunia rodriguez. So [facility] [phone].    ATENCIÓN: Si habla español, tiene a da silva disposición servicios gratuitos de asistencia lingüística. Llame al [phone].    We comply with applicable federal civil rights laws and [place] laws. We do not discriminate on the basis of race, color, national origin, age, disability sex, sexual orientation or gender identity.            Thank you!     Thank you for choosing [facility]  for your care. Our goal is always to provide you with excellent care. Hearing back from our patients is one way we can continue to improve our services. Please take a few minutes to complete the written survey that you may receive in the mail after your visit with us. Thank you!             Your Updated Medication List - Protect others around you: Learn how to safely use, store and throw away your medicines at www.disposemymeds.org.          This list is accurate as of: 6/26/17  3:57 PM.  Always use your most recent med list.                   Brand Name Dispense Instructions for use Diagnosis    ACE NOT PRESCRIBED (INTENTIONAL)     0 each    ACE Inhibitor not prescribed due to Other:    Type 2 diabetes mellitus without complication (H)       amLODIPine 5 MG tablet    NORVASC    90 tablet    Take 1 tablet (5 mg) by mouth every morning    Hypertension goal BP (blood pressure) < 130/80       aspirin 81 MG tablet      Take  by mouth daily.        B-D U/F 31G X 5 MM   Generic drug:  insulin pen needle     300 each    USE 4 DAILY WITH NOVOLOG AND LANTUS PENS AS DIRECTED    Type 2 diabetes mellitus without complication, with long-term current use of insulin (H)       blood glucose  monitoring test strip    no brand specified    4 Box    1 strip by In Vitro route 4 times daily (before meals and nightly)    Type 2 diabetes, HbA1c goal < 7% (H)       calcium + D 600-200 MG-UNIT Tabs   Generic drug:  calcium carbonate-vitamin D      1 Tablet daily        insulin glargine 100 UNIT/ML injection    LANTUS SOLOSTAR    15 mL    INJECT 9 UNITS AT BEDTIME    Type 2 diabetes mellitus without complication (H)       insulin lispro 100 UNIT/ML injection    HumaLOG    3 Month    Inject  Subcutaneous 3 times daily (before meals). 10 units before breakfast, 10 units before lunch, 10 units before dinner    Type 2 diabetes, HbA1c goal < 7% (H)       * order for DME     1 each    Wheelchair seat cushion    Multiple sclerosis (H), Osteosarcoma (H)       * order for DME     1 Units    Crutch, forearm    Chondrosarcoma (H)       simvastatin 80 MG tablet    ZOCOR    45 tablet    TAKE ONE-HALF TABLET BY MOUTH DAILY    CARDIOVASCULAR SCREENING; LDL GOAL LESS THAN 100       triamcinolone 0.1 % cream    KENALOG    15 g    Apply sparingly to affected area two times daily for 14 days.    Dermatitis       vitamin D3 2000 UNITS Caps      1 CAPSULE DAILY        * Notice:  This list has 2 medication(s) that are the same as other medications prescribed for you. Read the directions carefully, and ask your doctor or other care provider to review them with you.

## 2017-07-17 NOTE — PROGRESS NOTES
"  SUBJECTIVE:                                                    Aden Vaz is a 56 year old male who presents to clinic today for the following health issues:      Rash--    This patient has developed atypical zoster dermatitis involving the right-sided, the mandibular branch of the right facial nerve. He began with pain 8 days ago and typical vesicular rash 4 days ago. He has some pain involving the right ear. Has managed pain control without opioids. No eye involvement noted.    Onset: Saturday     Description:   Location: Right Side of face  Character: painful, burning, red  Itching (Pruritis): YES    Progression of Symptoms:  worsening    Accompanying Signs & Symptoms:  Fever: no   Body aches or joint pain: no   Sore throat symptoms: no   Recent cold symptoms: No; had sinus headache this morning and headache since last Tuesday     History:   Previous similar rash: no     Precipitating factors:   Exposure to similar rash: no   New exposures: None   Recent travel: no     Alleviating factors:  None    Therapies Tried and outcome: Hydrocortisone ointment, creams; helped a little with itching but not much.     High Blood Sugars since Sunday. Highest around 240's. Appears to have developed insulin resistance with the acute infection, not unexpected.        Problem list and histories reviewed & adjusted, as indicated.  Additional history: as documented        Reviewed and updated as needed this visit by clinical staff       Reviewed and updated as needed this visit by Provider         ROS:   ROS: 10 point ROS neg or unchanged other than the symptoms noted above in the HPI. Has insulin-dependent diabetes and multiple sclerosis.      OBJECTIVE:                                                    /78  Pulse 80  Temp 98.1  F (36.7  C) (Temporal)  Resp 14  Ht 6' 2\" (1.88 m)  Wt 182 lb 11.2 oz (82.9 kg)  BMI 23.46 kg/m2  Body mass index is 23.46 kg/(m^2).  GENERAL: alert and cooperative  EYES: Eyes grossly " normal to inspection, extraocular movements - intact, and PERRL  HENT: ear canals- normal; TMs- normal; Nose- normal; Mouth- no ulcers, no lesions  HENT: Right tympanic membrane right ear canal both appear normal. No vesicular involvement of the external ear.  NECK: no tenderness, no adenopathy, no asymmetry, no masses, no stiffness; thyroid- normal to palpation  RESP: lungs clear to auscultation - no rales, no rhonchi, no wheezes  CV: regular rates and rhythm, normal S1 S2, no S3 or S4 and no murmur, no click or rub -  ABDOMEN: soft, no tenderness, no  hepatosplenomegaly, no masses, normal bowel sounds  MS: No edema. Walks with crutches due to MS  SKIN: Typical herpes zoster dermatomal involvement of red-based vesicular lesions overlying the lower right cheek, right temple and the right parietal scalp. Some pustular changes in some of the lesions, possibly secondary infection.  NEURO: Lower extremity ataxia weakness requiring crutches due to MS. No cranial nerve changes.  PSYCH: Alert and oriented times 3; speech- coherent , normal rate and volume; able to articulate logical thoughts, able to abstract reason, no tangential thoughts, no hallucinations or delusions, affect- normal    Diagnostic test results:  Diagnostic Test Results:  none        ASSESSMENT/PLAN:                                                    1. Herpes zoster without complication   Patient is 8 days from the onset of pain. A new lesion occurred in his scalp today. We'll treat with 7 days of Valtrex and 10 days of Ceftin for possible secondary infection.  - valacyclovir (VALTREX) 1000 mg tablet; Take 1 tablet (1,000 mg) by mouth 3 times daily  Dispense: 21 tablet; Refill: 0  - cefuroxime (CEFTIN) 250 MG tablet; Take 1 tablet (250 mg) by mouth 2 times daily  Dispense: 20 tablet; Refill: 0      Follow up with Provider - Follow-up as needed. Information on zoster given.  See Patient Instructions    The patient understood the rational for the  diagnosis and treatment plan. All questions were answered to best of my ability and the patient's satisfaction.      Vishal Villatoro MD  [facility]

## 2017-07-19 ENCOUNTER — OFFICE VISIT (OUTPATIENT)
Dept: FAMILY MEDICINE | Facility: CLINIC | Age: 57
End: 2017-07-19
Payer: COMMERCIAL

## 2017-07-19 VITALS
HEART RATE: 80 BPM | DIASTOLIC BLOOD PRESSURE: 78 MMHG | SYSTOLIC BLOOD PRESSURE: 126 MMHG | WEIGHT: 182.7 LBS | RESPIRATION RATE: 14 BRPM | TEMPERATURE: 98.1 F | HEIGHT: 74 IN | BODY MASS INDEX: 23.45 KG/M2

## 2017-07-19 DIAGNOSIS — B02.9 HERPES ZOSTER WITHOUT COMPLICATION: Primary | ICD-10-CM

## 2017-07-19 PROCEDURE — 99213 OFFICE O/P EST LOW 20 MIN: CPT | Performed by: FAMILY MEDICINE

## 2017-07-19 RX ORDER — CEFUROXIME AXETIL 250 MG/1
250 TABLET ORAL 2 TIMES DAILY
Qty: 20 TABLET | Refills: 0 | Status: SHIPPED | OUTPATIENT
Start: 2017-07-19 | End: 2017-09-25

## 2017-07-19 RX ORDER — VALACYCLOVIR HYDROCHLORIDE 1 G/1
1000 TABLET, FILM COATED ORAL 3 TIMES DAILY
Qty: 21 TABLET | Refills: 0 | Status: SHIPPED | OUTPATIENT
Start: 2017-07-19 | End: 2017-08-02

## 2017-07-19 NOTE — PATIENT INSTRUCTIONS
These are new changes to your current plan of care based on today's visit:    Medications stopped    Medications to be started Valtrex 1000 mg three times daily    Ceftin 250 mg twice daily for ten days   Change dose of this medication    New treatments        Follow up appointments:    1.  FOLLOW UP WITH YOUR PRIMARY CARE PROVIDER: As planned    Vishal Villatoro MD

## 2017-07-19 NOTE — MR AVS SNAPSHOT
After Visit Summary   7/19/2017    Aden Vaz    MRN: [id number]           Patient Information     Date Of Birth          1960        Visit Information        Provider Department      7/19/2017 1:40 PM Vishal Villatoro MD [facility]        Today's Diagnoses     Herpes zoster without complication    -  1      Care Instructions    These are new changes to your current plan of care based on today's visit:    Medications stopped    Medications to be started Valtrex 1000 mg three times daily    Ceftin 250 mg twice daily for ten days   Change dose of this medication    New treatments        Follow up appointments:    1.  FOLLOW UP WITH YOUR PRIMARY CARE PROVIDER: As planned    Vishal Villatoro MD                Follow-ups after your visit        Follow-up notes from your care team     Return if symptoms worsen or fail to improve.      Your next 10 appointments already scheduled     Sep 05, 2017  1:45 PM CDT   (Arrive by 1:30 PM)   MR BRAIN W/O & W CONTRAST with [address] [facility] ([facility])    [address] [place] [id number] [phone]           Take your medicines as usual, unless your doctor tells you not to. Bring a list of your current medicines to your exam (including vitamins, minerals and over-the-counter drugs).  You will be given intravenous contrast for this exam. To prepare:   The day before your exam, drink extra fluids at least six 8-ounce glasses (unless your doctor tells you to restrict your fluids).   Have a blood test (creatinine test) within 30 days of your exam. Go to your clinic or Diagnostic Imaging Department for this test.  The MRI machine uses a strong magnet. Please wear clothes without metal (snaps, zippers). A sweatsuit works well, or we may give you a hospital gown.  Please remove any body piercings and hair extensions before you arrive. You will also remove watches, jewelry,  hairpins, wallets, dentures, partial dental plates and hearing aids. You may wear contact lenses, and you may be able to wear your rings. We have a safe place to keep your personal items, but it is safer to leave them at home.   **IMPORTANT** THE INSTRUCTIONS BELOW ARE ONLY FOR THOSE PATIENTS WHO HAVE BEEN TOLD THEY WILL RECEIVE SEDATION OR GENERAL ANESTHESIA DURING THEIR MRI PROCEDURE:  IF YOU WILL RECEIVE SEDATION (take medicine to help you relax during your exam):   You must get the medicine from your doctor before you arrive. Bring the medicine to the exam. Do not take it at home.   Arrive one hour early. Bring someone who can take you home after the test. Your medicine will make you sleepy. After the exam, you may not drive, take a bus or take a taxi by yourself.   No eating 8 hours before your exam. You may have clear liquids up until 4 hours before your exam. (Clear liquids include water, clear tea, black coffee and fruit juice without pulp.)  IF YOU WILL RECEIVE ANESTHESIA (be asleep for your exam):   Arrive 1 1/2 hours early. Bring someone who can take you home after the test. You may not drive, take a bus or take a taxi by yourself.   No eating 8 hours before your exam. You may have clear liquids up until 4 hours before your exam. (Clear liquids include water, clear tea, black coffee and fruit juice without pulp.)  Please call the Imaging Department at your exam site with any questions.            Sep 05, 2017  3:00 PM CDT   (Arrive by 2:45 PM)   Return Multiple Sclerosis with Isai Kathleen MD   [facility] ([facility])    [address] [place] [id number] [phone]            Sep 28, 2017  2:30 PM CDT   (Arrive by 2:15 PM)   Return Visit with Payam Gage MD   [facility] ([facility])    [address] [place] [id number] [phone]              Who to  "contact     If you have questions or need follow up information about today's clinic visit or your schedule please contact [facility] directly at [phone].  Normal or non-critical lab and imaging results will be communicated to you by MyChart, letter or phone within 4 business days after the clinic has received the results. If you do not hear from us within 7 days, please contact the clinic through [employer] or phone. If you have a critical or abnormal lab result, we will notify you by phone as soon as possible.  Submit refill requests through [employer] or call your pharmacy and they will forward the refill request to us. Please allow 3 business days for your refill to be completed.          Additional Information About Your Visit        [employer] Information     [employer] gives you secure access to your electronic health record. If you see a primary care provider, you can also send messages to your care team and make appointments. If you have questions, please call your primary care clinic.  If you do not have a primary care provider, please call [phone] and they will assist you.        Care EveryWhere ID     This is your Care EveryWhere ID. This could be used by other organizations to access your [facility] medical records  [id number]        Your Vitals Were     Pulse Temperature Respirations Height BMI (Body Mass Index)       80 98.1  F (36.7  C) (Temporal) 14 6' 2\" (1.88 m) 23.46 kg/m2        Blood Pressure from Last 3 Encounters:   07/19/17 126/78   06/26/17 138/72   04/11/17 112/72    Weight from Last 3 Encounters:   07/19/17 182 lb 11.2 oz (82.9 kg)   06/26/17 187 lb 11.2 oz (85.1 kg)   04/11/17 191 lb 9.6 oz (86.9 kg)              Today, you had the following     No orders found for display         Today's Medication Changes          These changes are accurate as of: 7/19/17  2:00 PM.  If you have any questions, ask your nurse or doctor.               Start taking these medicines.        " Dose/Directions    cefuroxime 250 MG tablet   Commonly known as:  CEFTIN   Used for:  Herpes zoster without complication   Started by:  Vishal Villatoro MD        Dose:  250 mg   Take 1 tablet (250 mg) by mouth 2 times daily   Quantity:  20 tablet   Refills:  0       valACYclovir 1000 mg tablet   Commonly known as:  VALTREX   Used for:  Herpes zoster without complication   Started by:  Vishal Villatoro MD        Dose:  1000 mg   Take 1 tablet (1,000 mg) by mouth 3 times daily   Quantity:  21 tablet   Refills:  0            Where to get your medicines      These medications were sent to [facility] [id number] - [place] - [address] AT SEC of [facility] [address]  [address], [place] [address]    Hours:  test fax sent successfully 1/20/04   Phone:  [phone]     cefuroxime 250 MG tablet    valACYclovir 1000 mg tablet                Primary Care Provider Office Phone # Fax #    Melita Painter [phone]       [facility] [address] [place] [address]  [place] [id number]        Equal Access to Services     [facility]: Hadii aad ku hadasho Soomaali, waaxda luqadaha, qaybta kaalmada adeegyada, bryanna marcin hayjuliusn daryl duque . So [facility] [phone].    ATENCIÓN: Si habla español, tiene a da silva disposición servicios gratuitos de asistencia lingüística. [facility] [phone].    We comply with applicable federal civil rights laws and [place] laws. We do not discriminate on the basis of race, color, national origin, age, disability sex, sexual orientation or gender identity.            Thank you!     Thank you for choosing [facility]  for your care. Our goal is always to provide you with excellent care. Hearing back from our patients is one way we can continue to improve our services. Please take a few minutes to complete the written survey that you may receive in the mail after your visit with us. Thank you!             Your Updated Medication List -  Protect others around you: Learn how to safely use, store and throw away your medicines at www.disposemymeds.org.          This list is accurate as of: 7/19/17  2:00 PM.  Always use your most recent med list.                   Brand Name Dispense Instructions for use Diagnosis    ACE NOT PRESCRIBED (INTENTIONAL)     0 each    ACE Inhibitor not prescribed due to Other:    Type 2 diabetes mellitus without complication (H)       amLODIPine 5 MG tablet    NORVASC    90 tablet    Take 1 tablet (5 mg) by mouth every morning    Hypertension goal BP (blood pressure) < 130/80       aspirin 81 MG tablet      Take  by mouth daily.        B-D U/F 31G X 5 MM   Generic drug:  insulin pen needle     300 each    USE 4 DAILY WITH NOVOLOG AND LANTUS PENS AS DIRECTED    Type 2 diabetes mellitus without complication, with long-term current use of insulin (H)       blood glucose monitoring test strip    no brand specified    4 Box    1 strip by In Vitro route 4 times daily (before meals and nightly)    Type 2 diabetes, HbA1c goal < 7% (H)       calcium + D 600-200 MG-UNIT Tabs   Generic drug:  calcium carbonate-vitamin D      1 Tablet daily        cefuroxime 250 MG tablet    CEFTIN    20 tablet    Take 1 tablet (250 mg) by mouth 2 times daily    Herpes zoster without complication       insulin glargine 100 UNIT/ML injection    LANTUS SOLOSTAR    15 mL    INJECT 9 UNITS AT BEDTIME    Type 2 diabetes mellitus without complication (H)       insulin lispro 100 UNIT/ML injection    HumaLOG    3 Month    Inject  Subcutaneous 3 times daily (before meals). 10 units before breakfast, 10 units before lunch, 10 units before dinner    Type 2 diabetes, HbA1c goal < 7% (H)       * order for DME     1 each    Wheelchair seat cushion    Multiple sclerosis (H), Osteosarcoma (H)       * order for DME     1 Units    Crutch, forearm    Chondrosarcoma (H)       simvastatin 80 MG tablet    ZOCOR    45 tablet    TAKE ONE-HALF TABLET BY MOUTH DAILY     CARDIOVASCULAR SCREENING; LDL GOAL LESS THAN 100       triamcinolone 0.1 % cream    KENALOG    15 g    Apply sparingly to affected area two times daily for 14 days.    Dermatitis       valACYclovir 1000 mg tablet    VALTREX    21 tablet    Take 1 tablet (1,000 mg) by mouth 3 times daily    Herpes zoster without complication       vitamin D3 2000 UNITS Caps      1 CAPSULE DAILY        * Notice:  This list has 2 medication(s) that are the same as other medications prescribed for you. Read the directions carefully, and ask your doctor or other care provider to review them with you.

## 2017-07-19 NOTE — NURSING NOTE
"Chief Complaint   Patient presents with     Derm Problem     Rash since saturday     Panel Management     Eye Exam       Initial /78  Pulse 80  Temp 98.1  F (36.7  C) (Temporal)  Resp 14  Ht 6' 2\" (1.88 m)  Wt 182 lb 11.2 oz (82.9 kg)  BMI 23.46 kg/m2 Estimated body mass index is 23.46 kg/(m^2) as calculated from the following:    Height as of this encounter: 6' 2\" (1.88 m).    Weight as of this encounter: 182 lb 11.2 oz (82.9 kg).  Medication Reconciliation: complete   Silvia Ovalle      "

## 2017-07-26 ENCOUNTER — TRANSFERRED RECORDS (OUTPATIENT)
Dept: HEALTH INFORMATION MANAGEMENT | Facility: CLINIC | Age: 57
End: 2017-07-26

## 2017-08-01 NOTE — PROGRESS NOTES
SUBJECTIVE:                                                    Aden Vaz is a 56 year old male who presents to clinic today for the following health issues:      Concern - Pain on right side of face for over three weeks, starts at ear radiates down through jaw, recheck on shingles  Onset: 3 weeks    Description:   Nerve pain    Intensity: moderate sometimes severe, wakes pt. Up at night    Progression of Symptoms:  worsening and constant    Accompanying Signs & Symptoms:ear feels plugged and stabbing pain, possible ear infection?    Previous history of similar problem:   No    Precipitating factors:   Worsened by: touching it    Alleviating factors:  Improved by: ibuprofen    Therapies Tried and outcome: cold press helps for a   little bit     He reports that he is here for shingles follow up. He feels a sharp pain with a burning sensation on right side of face above his ear. He feels pain in his right ear. He has sore areas on his face. His teeth ache on the right side of his face. He was taking antibiotics, antiviral medication, and Ibuprofen. He denies having shingles before this. He would like to check that his ears are okay. He does not want any addictive medication because he has been sober.     He is not sleeping well. He tries to lay on left side.     Type 2 Diabetes  Patient reports that his Type 2 Diabetes is controlled.     Bruising  He notes having bruising on his legs.       Problem list and histories reviewed & adjusted, as indicated.  Additional history: as documented    BP Readings from Last 3 Encounters:   08/02/17 124/76   07/19/17 126/78   06/26/17 138/72    Wt Readings from Last 3 Encounters:   08/02/17 86.5 kg (190 lb 11.2 oz)   07/19/17 82.9 kg (182 lb 11.2 oz)   06/26/17 85.1 kg (187 lb 11.2 oz)           Reviewed and updated as needed this visit by clinical staff       Reviewed and updated as needed this visit by Provider         ROS:  C: NEGATIVE for fever, chills, change in weight.  "See HPI above.   INTEGUMENTARY/SKIN: NEGATIVE for worrisome rashes, moles or lesions. See HPI above.   E/M: NEGATIVE for ear, mouth and throat problems. See HPI above.   R: NEGATIVE for significant cough or SOB  CV: NEGATIVE for chest pain, palpitations or peripheral edema  MUSCULOSKELETAL: NEGATIVE for significant arthralgias or myalgia. See HPI above.  NEURO: NEGATIVE for weakness, dizziness or paresthesias. See HPI above.    This document serves as a record of the services and decisions personally performed and made by Melita Painter MD. It was created on her behalf by Baylee Gibson, a trained medical scribe. The creation of this document is based on the provider's statements to the medical scribe.  Baylee Gibson 1:39 PM August 2, 2017      OBJECTIVE:     /76  Pulse 80  Temp 97.5  F (36.4  C) (Temporal)  Resp 16  Ht 1.89 m (6' 2.41\")  Wt 86.5 kg (190 lb 11.2 oz)  BMI 24.22 kg/m2  Body mass index is 24.22 kg/(m^2).  GENERAL APPEARANCE: healthy, alert and no distress  HENT: ear canals and TM's normal and nose and mouth without ulcers or lesions  NECK: mild adenopathy anterior cervical, otherwise no adenopathy, no asymmetry, masses, or scars and thyroid normal to palpation  MS: extremities normal- no gross deformities noted  SKIN: few erythematous scabbed lesions on right side of head and face along the mandible area, otherwise no suspicious lesions or rashes  NEURO: Normal strength and tone, mentation intact and speech normal    Diagnostic Test Results:  No results found for this or any previous visit (from the past 24 hour(s)).    ASSESSMENT/PLAN:           1. Herpes zoster without complication  Patient reports for follow up with shingles. Continued pain. Difficulty sleeping due to the pain. He has finished antiviral and antibiotic.   Will treat with Neurontin 300mg for pain. Recommended to use Ibuprofen as needed.   Discussed tramadol or vicodin. He declined.   - gabapentin (NEURONTIN) 300 MG " capsule; Take 1 tablet (300 mg) every night. May increase to twice daily after 3 days as needed.  Dispense: 60 capsule; Refill: 1    2. Type 2 diabetes mellitus without complication, with long-term current use of insulin (H)  No change to plan.     Follow Up: Return to clinic if symptoms worsen.     The information in this document, created by the medical scribe for me, accurately reflects the services I personally performed and the decisions made by me. I have reviewed and approved this document for accuracy prior to leaving the patient care area.  August 2, 2017 1:40 PM    DONALD CALDERON MD, MD  [facility]

## 2017-08-02 ENCOUNTER — OFFICE VISIT (OUTPATIENT)
Dept: FAMILY MEDICINE | Facility: CLINIC | Age: 57
End: 2017-08-02
Payer: COMMERCIAL

## 2017-08-02 VITALS
HEIGHT: 74 IN | WEIGHT: 190.7 LBS | TEMPERATURE: 97.5 F | HEART RATE: 80 BPM | BODY MASS INDEX: 24.47 KG/M2 | RESPIRATION RATE: 16 BRPM | SYSTOLIC BLOOD PRESSURE: 124 MMHG | DIASTOLIC BLOOD PRESSURE: 76 MMHG

## 2017-08-02 DIAGNOSIS — E11.9 TYPE 2 DIABETES MELLITUS WITHOUT COMPLICATION, WITH LONG-TERM CURRENT USE OF INSULIN (H): ICD-10-CM

## 2017-08-02 DIAGNOSIS — B02.9 HERPES ZOSTER WITHOUT COMPLICATION: Primary | ICD-10-CM

## 2017-08-02 DIAGNOSIS — Z79.4 TYPE 2 DIABETES MELLITUS WITHOUT COMPLICATION, WITH LONG-TERM CURRENT USE OF INSULIN (H): ICD-10-CM

## 2017-08-02 PROCEDURE — 99214 OFFICE O/P EST MOD 30 MIN: CPT | Performed by: FAMILY MEDICINE

## 2017-08-02 RX ORDER — GABAPENTIN 300 MG/1
CAPSULE ORAL
Qty: 60 CAPSULE | Refills: 1 | Status: SHIPPED | OUTPATIENT
Start: 2017-08-02 | End: 2017-09-25

## 2017-08-02 ASSESSMENT — PAIN SCALES - GENERAL: PAINLEVEL: SEVERE PAIN (6)

## 2017-08-02 NOTE — MR AVS SNAPSHOT
After Visit Summary   8/2/2017    Aden Vaz    MRN: [id number]           Patient Information     Date Of Birth          1960        Visit Information        Provider Department      8/2/2017 1:00 PM Melita Painter MD [facility]        Today's Diagnoses     Herpes zoster without complication    -  1    Type 2 diabetes mellitus without complication, with long-term current use of insulin (H)           Follow-ups after your visit        Your next 10 appointments already scheduled     Sep 05, 2017  1:45 PM CDT   (Arrive by 1:30 PM)   MR BRAIN W/O & W CONTRAST with [address] MRI ([facility])    [address] [address] [phone]           Take your medicines as usual, unless your doctor tells you not to. Bring a list of your current medicines to your exam (including vitamins, minerals and over-the-counter drugs).  You will be given intravenous contrast for this exam. To prepare:   The day before your exam, drink extra fluids at least six 8-ounce glasses (unless your doctor tells you to restrict your fluids).   Have a blood test (creatinine test) within 30 days of your exam. Go to your clinic or Diagnostic Imaging Department for this test.  The MRI machine uses a strong magnet. Please wear clothes without metal (snaps, zippers). A sweatsuit works well, or we may give you a hospital gown.  Please remove any body piercings and hair extensions before you arrive. You will also remove watches, jewelry, hairpins, wallets, dentures, partial dental plates and hearing aids. You may wear contact lenses, and you may be able to wear your rings. We have a safe place to keep your personal items, but it is safer to leave them at home.   **IMPORTANT** THE INSTRUCTIONS BELOW ARE ONLY FOR THOSE PATIENTS WHO HAVE BEEN TOLD THEY WILL RECEIVE SEDATION OR GENERAL ANESTHESIA DURING THEIR MRI PROCEDURE:  IF YOU WILL  RECEIVE SEDATION (take medicine to help you relax during your exam):   You must get the medicine from your doctor before you arrive. Bring the medicine to the exam. Do not take it at home.   Arrive one hour early. Bring someone who can take you home after the test. Your medicine will make you sleepy. After the exam, you may not drive, take a bus or take a taxi by yourself.   No eating 8 hours before your exam. You may have clear liquids up until 4 hours before your exam. (Clear liquids include water, clear tea, black coffee and fruit juice without pulp.)  IF YOU WILL RECEIVE ANESTHESIA (be asleep for your exam):   Arrive 1 1/2 hours early. Bring someone who can take you home after the test. You may not drive, take a bus or take a taxi by yourself.   No eating 8 hours before your exam. You may have clear liquids up until 4 hours before your exam. (Clear liquids include water, clear tea, black coffee and fruit juice without pulp.)  Please call the Imaging Department at your exam site with any questions.            Sep 05, 2017  3:00 PM CDT   (Arrive by 2:45 PM)   Return Multiple Sclerosis with Isai Kathleen MD   [facility] Multiple Sclerosis ([facility])    [address] [place] [address]  3rd [facility] [address] [phone]            Sep 25, 2017  2:15 PM CDT   (Arrive by 2:00 PM)   Return Visit with Payam Gage MD   [facility] ([facility])    [address] [place] [address]  4th [facility] [id number] [phone]              Who to contact     If you have questions or need follow up information about today's clinic visit or your schedule please contact [facility] [place] directly at [phone].  Normal or non-critical lab and imaging results will be communicated to you by MyChart, letter or phone within 4 business days after the clinic has received the results. If you do not hear from us within 7 days, please contact the  "clinic through Bou[employer] or phone. If you have a critical or abnormal lab result, we will notify you by phone as soon as possible.  Submit refill requests through Bouf or call your pharmacy and they will forward the refill request to us. Please allow 3 business days for your refill to be completed.          Additional Information About Your Visit        [employer] Information     B[employer]f gives you secure access to your electronic health record. If you see a primary care provider, you can also send messages to your care team and make appointments. If you have questions, please call your primary care clinic.  If you do not have a primary care provider, please call [phone] and they will assist you.        Care EveryWhere ID     This is your Care EveryWhere ID. This could be used by other organizations to access your [place] medical records  [id number]        Your Vitals Were     Pulse Temperature Respirations Height BMI (Body Mass Index)       80 97.5  F (36.4  C) (Temporal) 16 6' 2.41\" (1.89 m) 24.22 kg/m2        Blood Pressure from Last 3 Encounters:   08/02/17 124/76   07/19/17 126/78   06/26/17 138/72    Weight from Last 3 Encounters:   08/02/17 190 lb 11.2 oz (86.5 kg)   07/19/17 182 lb 11.2 oz (82.9 kg)   06/26/17 187 lb 11.2 oz (85.1 kg)              Today, you had the following     No orders found for display         Today's Medication Changes          These changes are accurate as of: 8/2/17  1:33 PM.  If you have any questions, ask your nurse or doctor.               Start taking these medicines.        Dose/Directions    gabapentin 300 MG capsule   Commonly known as:  NEURONTIN   Used for:  Herpes zoster without complication   Started by:  Melita Painter MD        Take 1 tablet (300 mg) every night. May increase to twice daily after 3 days as needed.   Quantity:  60 capsule   Refills:  1            Where to get your medicines      These medications were sent to [id number] - [place], " [place] - [address]  [address], [place] [address]    Hours:  test fax sent successfully 1/20/04  kr Phone:  [phone]     gabapentin 300 MG capsule                Primary Care Provider Office Phone # Fax #    Melita ALIZA Painter [phone]       [facility] [address] [facility]  [place] [address]        Equal Access to Services     [facility] : Hadii aad ku hadasho Soomaali, waaxda luqadaha, qaybta kaalmada adeegyada, waxay idiin hayaan adeeg kharash la'aan . So [facility] [phone].    ATENCIÓN: Si habla español, tiene a da silva disposición servicios gratuitos de asistencia lingüística. [facility] [phone].    We comply with applicable federal civil rights laws and [place] laws. We do not discriminate on the basis of race, color, national origin, age, disability sex, sexual orientation or gender identity.            Thank you!     Thank you for choosing [facility]  for your care. Our goal is always to provide you with excellent care. Hearing back from our patients is one way we can continue to improve our services. Please take a few minutes to complete the written survey that you may receive in the mail after your visit with us. Thank you!             Your Updated Medication List - Protect others around you: Learn how to safely use, store and throw away your medicines at www.disposemymeds.org.          This list is accurate as of: 8/2/17  1:33 PM.  Always use your most recent med list.                   Brand Name Dispense Instructions for use Diagnosis    ACE NOT PRESCRIBED (INTENTIONAL)     0 each    ACE Inhibitor not prescribed due to Other:    Type 2 diabetes mellitus without complication (H)       amLODIPine 5 MG tablet    NORVASC    90 tablet    Take 1 tablet (5 mg) by mouth every morning    Hypertension goal BP (blood pressure) < 130/80       aspirin 81 MG tablet      Take  by mouth daily.        B-D U/F 31G X 5 MM   Generic drug:   insulin pen needle     300 each    USE 4 DAILY WITH NOVOLOG AND LANTUS PENS AS DIRECTED    Type 2 diabetes mellitus without complication, with long-term current use of insulin (H)       blood glucose monitoring test strip    no brand specified    4 Box    1 strip by In Vitro route 4 times daily (before meals and nightly)    Type 2 diabetes, HbA1c goal < 7% (H)       calcium + D 600-200 MG-UNIT Tabs   Generic drug:  calcium carbonate-vitamin D      1 Tablet daily        cefuroxime 250 MG tablet    CEFTIN    20 tablet    Take 1 tablet (250 mg) by mouth 2 times daily    Herpes zoster without complication       gabapentin 300 MG capsule    NEURONTIN    60 capsule    Take 1 tablet (300 mg) every night. May increase to twice daily after 3 days as needed.    Herpes zoster without complication       insulin glargine 100 UNIT/ML injection    LANTUS SOLOSTAR    15 mL    INJECT 9 UNITS AT BEDTIME    Type 2 diabetes mellitus without complication (H)       insulin lispro 100 UNIT/ML injection    HumaLOG    3 Month    Inject  Subcutaneous 3 times daily (before meals). 10 units before breakfast, 10 units before lunch, 10 units before dinner    Type 2 diabetes, HbA1c goal < 7% (H)       * order for DME     1 each    Wheelchair seat cushion    Multiple sclerosis (H), Osteosarcoma (H)       * order for DME     1 Units    Crutch, forearm    Chondrosarcoma (H)       simvastatin 80 MG tablet    ZOCOR    45 tablet    TAKE ONE-HALF TABLET BY MOUTH DAILY    CARDIOVASCULAR SCREENING; LDL GOAL LESS THAN 100       triamcinolone 0.1 % cream    KENALOG    15 g    Apply sparingly to affected area two times daily for 14 days.    Dermatitis       vitamin D3 2000 UNITS Caps      1 CAPSULE DAILY        * Notice:  This list has 2 medication(s) that are the same as other medications prescribed for you. Read the directions carefully, and ask your doctor or other care provider to review them with you.

## 2017-08-25 DIAGNOSIS — E11.9 TYPE 2 DIABETES MELLITUS WITHOUT COMPLICATION, WITH LONG-TERM CURRENT USE OF INSULIN (H): ICD-10-CM

## 2017-08-25 DIAGNOSIS — Z79.4 TYPE 2 DIABETES MELLITUS WITHOUT COMPLICATION, WITH LONG-TERM CURRENT USE OF INSULIN (H): ICD-10-CM

## 2017-08-25 NOTE — TELEPHONE ENCOUNTER
insulin lispro (HUMALOG) 100 UNIT/ML VIAL kwikpen inj 5x3 ML        Last Written Prescription Date: 10/24/2016  Last Fill Quantity: 3, # refills: 1  Last Office Visit with [facility], [facility] or [facility] prescribing provider:  8/2/2017        BP Readings from Last 3 Encounters:   08/02/17 124/76   07/19/17 126/78   06/26/17 138/72     Lab Results   Component Value Date    MICROL <5 04/11/2017     Lab Results   Component Value Date    UMALCR Unable to calculate due to low value 04/11/2017     Creatinine   Date Value Ref Range Status   04/11/2017 0.96 0.66 - 1.25 mg/dL Final   ]  GFR Estimate   Date Value Ref Range Status   04/11/2017 81 >60 mL/min/1.7m2 Final     Comment:     Non  GFR Calc   10/04/2016 78 >60 mL/min/1.7m2 Final     Comment:     Non  GFR Calc   11/03/2015 85 >60 mL/min/1.7m2 Final     Comment:     Non  GFR Calc     GFR Estimate If Black   Date Value Ref Range Status   04/11/2017 >90   GFR Calc   >60 mL/min/1.7m2 Final   10/04/2016 >90   GFR Calc   >60 mL/min/1.7m2 Final   11/03/2015 >90   GFR Calc   >60 mL/min/1.7m2 Final     Lab Results   Component Value Date    CHOL 160 10/04/2016     Lab Results   Component Value Date    HDL 49 10/04/2016     Lab Results   Component Value Date    LDL 98 10/04/2016     Lab Results   Component Value Date    TRIG 64 10/04/2016     Lab Results   Component Value Date    CHOLHDLRATIO 3.6 09/10/2015     Lab Results   Component Value Date    AST 32 04/11/2017     Lab Results   Component Value Date    ALT 50 04/11/2017     Lab Results   Component Value Date    A1C 6.9 04/11/2017    A1C 6.7 10/04/2016    A1C 7.1 03/23/2016    A1C 6.7 09/26/2015    A1C 6.6 09/15/2015     Potassium   Date Value Ref Range Status   04/11/2017 4.3 3.4 - 5.3 mmol/L Final

## 2017-08-28 NOTE — TELEPHONE ENCOUNTER
Routing refill request to provider for review/approval because:  Appears to be a break in medication - should have been out around 4/24/17    Katina Ladd, RN, BSN

## 2017-09-06 DIAGNOSIS — E11.9 TYPE 2 DIABETES MELLITUS WITHOUT COMPLICATION (H): ICD-10-CM

## 2017-09-07 NOTE — TELEPHONE ENCOUNTER
Pending Prescriptions:                       Disp   Refills    LANTUS SOLOSTAR 100 UNIT/ML soln [Pharmac*15 mL  0            Sig: INJECT 9 UNITS AT BEDTIME             Last Written Prescription Date: 12/13/2016  Last Fill Quantity: 15ml, # refills: 1  Last Office Visit with [facility], [facility] or [facility] prescribing provider:  8/2/2017         BP Readings from Last 3 Encounters:   08/02/17 124/76   07/19/17 126/78   06/26/17 138/72     Lab Results   Component Value Date    MICROL <5 04/11/2017     Lab Results   Component Value Date    UMALCR Unable to calculate due to low value 04/11/2017     Creatinine   Date Value Ref Range Status   04/11/2017 0.96 0.66 - 1.25 mg/dL Final   ]  GFR Estimate   Date Value Ref Range Status   04/11/2017 81 >60 mL/min/1.7m2 Final     Comment:     Non  GFR Calc   10/04/2016 78 >60 mL/min/1.7m2 Final     Comment:     Non  GFR Calc   11/03/2015 85 >60 mL/min/1.7m2 Final     Comment:     Non  GFR Calc     GFR Estimate If Black   Date Value Ref Range Status   04/11/2017 >90   GFR Calc   >60 mL/min/1.7m2 Final   10/04/2016 >90   GFR Calc   >60 mL/min/1.7m2 Final   11/03/2015 >90   GFR Calc   >60 mL/min/1.7m2 Final     Lab Results   Component Value Date    CHOL 160 10/04/2016     Lab Results   Component Value Date    HDL 49 10/04/2016     Lab Results   Component Value Date    LDL 98 10/04/2016     Lab Results   Component Value Date    TRIG 64 10/04/2016     Lab Results   Component Value Date    CHOLHDLRATIO 3.6 09/10/2015     Lab Results   Component Value Date    AST 32 04/11/2017     Lab Results   Component Value Date    ALT 50 04/11/2017     Lab Results   Component Value Date    A1C 6.9 04/11/2017    A1C 6.7 10/04/2016    A1C 7.1 03/23/2016    A1C 6.7 09/26/2015    A1C 6.6 09/15/2015     Potassium   Date Value Ref Range Status   04/11/2017 4.3 3.4 - 5.3 mmol/L Final

## 2017-09-11 RX ORDER — INSULIN GLARGINE 100 [IU]/ML
INJECTION, SOLUTION SUBCUTANEOUS
Qty: 15 ML | Refills: 0 | Status: SHIPPED | OUTPATIENT
Start: 2017-09-11 | End: 2017-10-11

## 2017-09-11 NOTE — TELEPHONE ENCOUNTER
Prescription approved per [facility] Refill Protocol.  Katina Ladd, RN, BSN

## 2017-09-19 ASSESSMENT — ENCOUNTER SYMPTOMS
NIGHT SWEATS: 0
BACK PAIN: 1
PANIC: 0
DOUBLE VISION: 0
SINUS PAIN: 0
WEIGHT GAIN: 0
CHILLS: 0
SKIN CHANGES: 0
POOR WOUND HEALING: 0
PARALYSIS: 0
DECREASED APPETITE: 0
SINUS CONGESTION: 1
SORE THROAT: 0
NAIL CHANGES: 0
HOARSE VOICE: 0
EYE WATERING: 0
INCREASED ENERGY: 1
POLYPHAGIA: 0
MUSCLE CRAMPS: 0
FLANK PAIN: 0
EYE IRRITATION: 1
POLYDIPSIA: 1
FEVER: 0
FATIGUE: 1
SEIZURES: 0
NUMBNESS: 1
DYSURIA: 0
NECK PAIN: 1
DECREASED CONCENTRATION: 1
TREMORS: 0
TROUBLE SWALLOWING: 0
TASTE DISTURBANCE: 0
NECK MASS: 0
INSOMNIA: 1
ALTERED TEMPERATURE REGULATION: 1
SPEECH CHANGE: 1
DIZZINESS: 1
WEAKNESS: 1
TINGLING: 1
WEIGHT LOSS: 0
HEMATURIA: 0
NERVOUS/ANXIOUS: 1
EYE PAIN: 0
HEADACHES: 1
ARTHRALGIAS: 1
MUSCLE WEAKNESS: 1
EYE REDNESS: 1
JOINT SWELLING: 0
MEMORY LOSS: 0
DISTURBANCES IN COORDINATION: 1
SMELL DISTURBANCE: 0
LOSS OF CONSCIOUSNESS: 0
HALLUCINATIONS: 0
MYALGIAS: 1
STIFFNESS: 1
DEPRESSION: 0
DIFFICULTY URINATING: 1

## 2017-09-22 DIAGNOSIS — C41.9 CHONDROSARCOMA (H): Primary | ICD-10-CM

## 2017-09-25 ENCOUNTER — OFFICE VISIT (OUTPATIENT)
Dept: ORTHOPEDICS | Facility: CLINIC | Age: 57
End: 2017-09-25

## 2017-09-25 VITALS — HEIGHT: 75 IN | BODY MASS INDEX: 23.55 KG/M2 | WEIGHT: 189.4 LBS

## 2017-09-25 DIAGNOSIS — C41.9 CHONDROSARCOMA (H): Primary | ICD-10-CM

## 2017-09-25 DIAGNOSIS — Z96.649 MECHANICAL COMPLICATION OF PROSTHETIC HIP IMPLANT, SUBSEQUENT ENCOUNTER: ICD-10-CM

## 2017-09-25 DIAGNOSIS — T84.098D MECHANICAL COMPLICATION OF PROSTHETIC HIP IMPLANT, SUBSEQUENT ENCOUNTER: ICD-10-CM

## 2017-09-25 NOTE — NURSING NOTE
"Chief Complaint   Patient presents with     Surgical Followup     F/u Status post multiply failed revision left hip arthroplasty DOS: 3/21/08       57 year old  1960    Ht 1.892 m (6' 2.5\")  Wt 85.9 kg (189 lb 6.4 oz)  BMI 23.99 kg/m2              [facility] [facility] [address] [place] - [address]    Allergies   Allergen Reactions     No Known Drug Allergies      Current Outpatient Prescriptions   Medication     LANTUS SOLOSTAR 100 UNIT/ML soln     insulin lispro (HUMALOG PEN) 100 UNIT/ML injection     amLODIPine (NORVASC) 5 MG tablet     simvastatin (ZOCOR) 80 MG tablet     B-D U/F insulin pen needle     blood glucose monitoring (NO BRAND SPECIFIED) test strip     ACE NOT PRESCRIBED, INTENTIONAL,     ORDER FOR DME     aspirin 81 MG tablet     ORDER FOR DME     CALCIUM + D 600-200 MG-UNIT PO TABS     VITAMIN D3 2000 UNIT PO CAPS     No current facility-administered medications for this visit.          Questionnaires:  HOOS Hip Dysfunction & Osteoarthritis Outcome Questionnaire    Hip Dysfunction & Osteoarthritis Outcome Score (HOOS), English Version LK 2.0 ([facility] STROUD, Jayden VITAL, Rosales BOOTH, 2003) 3/17/2017   S1. Do you feel grinding, hear clicking or any other type of noise from your hip? Sometimes   S2. Difficulties spreading legs wide apart Mild   S3. Difficulties to stride out when walking Mild   S4. How severe is your hip joint stiffness after first wakening in the morning? Mild   S5. How severe is your hip stiffness after sitting, lying or resting LATER IN THE DAY? Mild   Symptom Count 5   Symptom Sum 6   Symptom Mean 1.2   Symptom Subscale Score 70   P1. How often is your hip painful? Daily   P2. Straightening your hip fully Moderate   P3. Bending your hip FULLY Moderate   P4. Walking on a flat surface Mild   P5. Going up or down stairs None   P6. At night while in bed Mild   P7. Sitting or lying Mild   P8. Standing upright Mild   P9. Walking on a hard surface " (asphalt, concrete, etc.) Mild   P10. Walking on an uneven surface Moderate   Pain Count 10   Pain Sum 14   Pain Mean 1.4   Pain Subscale Score 65   A1. Descending stairs None   A2. Ascending stairs None   A3. Rising from sitting Moderate   A4. Standing Mild   A5. Bending to the floor/ an object Severe   A6. Walking on a flat surface Mild   A7. Getting in/out of car Moderate   A8. Going shopping Mild   A9. Putting on socks/stockings Moderate   A10. Rising from bed Moderate   A11. Taking off socks/stockings Mild   A12. Lying in bed (turning over, maintaining hip position) Moderate   A13. Getting in/out of bed Moderate   A14. Sitting None   A15. Getting on/off toilet Mild   A16. Heavy domestic duties (moving heavy boxes, scrubbing floors, etc.) Severe   A17. Light domestic duties (cooking, dusting, etc.) Mild   ADL Count 17   ADL Sum 24   ADL Mean 1.41   ADL Subscale Score 64.7   SP1. Squatting Severe   SP2. Running None   SP3. Twisting/pivoting on loaded leg Moderate   SP4. Walking on uneven surface Moderate   Sports/Rec Count 4   Sports/Rec Sum 7   Sports/Rec Mean 1.75   Sports/Rec Subscale Score 56.25   Q1. How often are you aware of your hip problem? Constantly   Q2. Have you modified you life style to avoid activities potentially damaging to your hip? Totally   Q3. How much are you troubled with lack of confidence in your hip? Severely   Q4. In general, how much difficulty do you have with your hip? Severe   QOL Count 4   QOL Sum 14   QOL Mean 3.5   Quality of Life Subscale Score 12.5            KOOS Knee Survey Assessment    No flowsheet data found.         Promis 10 Assessment    PROMIS 10 3/17/2017   In general, would you say your health is: Good = 3   In general, would you say your quality of life is: Fair = 2   In general, how would you rate your physical health? Good = 3   In general, how would you rate your mental health, including your mood and your ability to think? Very good = 4   In general, how  would you rate your satisfaction with your social activities and relationships? Fair = 2   In general, please rate how well you carry out your usual social activities and roles Poor = 1   To what extent are you able to carry out your everyday physical activities such as walking, climbing stairs, carrying groceries, or moving a chair? A Little = 2   How often have you been bothered by emotional problems such as feeling anxious, depressed or irritable? Rarely = 4   How would you rate your fatigue on average? Moderate = 3   How would you rate your pain on average?   0 = No Pain  to  10 = Worst Imaginable Pain 3   Global Physical Health Score : Raw Score 12 (SUM : G03 - G06 - G07 - G08)   Global Mentall Health Score : Raw Score 12 (SUM : G02 - G04 - G05 - G10)   Total (Physical + Mental Health Score) 24   Some recent data might be hidden              Christy Badillo C.M.A.

## 2017-09-25 NOTE — LETTER
9/25/2017       RE: Aden Vaz  [address]   [facility] [address]     Dear Colleague,    Thank you for referring your patient, Aden Vaz, to the [facility] at [facility]. Please see a copy of my visit note below.        Orthopaedic Oncology and Adult Reconstructive (joint replacement) Surgery   Clinic visit -  Payam Gage M.D.,    Dx:   1. Chondrosarcoma left pelvis  2. Multiple Sclerosis  3. DM     Surgeries:   1. 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 Tavo Gomez.   2. March 12, 2008, removal of fractured left pelvis allograft and reimplantation of second left hemipelvis allograft with internal fixation and revision left total hip arthroplasty, Dr Gage  3. 9/25/2015, Revision failed L hemipelvis allograft/MINNIE composite to custom R hemipelvis and MINNIE prosthesis (Biomet pelvis, vit E poly liner, CoChr 36mm +9 head) with Sacral ala and pubic symphysis fixation.       Mr. Samuels tumor intradurally and we reviewed his situation. He is using 2 crutches for ambulation. He has much less pain than was present prior to his last surgical procedure with a custom hemipelvis implant. The anterior pubic fixation has failed and a custom implant has been designed to bridge the symphysis and attached to the contralateral right side. Furthermore, the posterior fixation screws through the sacrum into the right sacral ala have been redesigned with new solid screws available for usage.  On examination his incision is well-healed. He ambulates with 2 crutches.  My recommendation was to proceed with revision internal fixation of his posterior sacral alar junction. In addition I recommended that we dislocated his hip and revised the anterior pubic ramus fixation as well. He understands this dislocation and expected recovery.  We reviewed risks benefits alternatives to the procedure. X-rays were obtained  today and are not appreciably different than previous x-rays. Some lucency about the tip of the sacral fixation screws is noted on the right side. However it is not present in the sacrum and this leads me to believe that this may be due to some micromotion at the right sacroiliac junction.  Mr. Vaz would like some time to think about his management options and whether or not proceed with the above-mentioned surgery. He will contact by Gabriella Aguilera with his decision.    Payam Gage MD  [facility] Family Professor  Oncology and Adult Reconstructive Surgery  Dept Orthopaedic Surgery, [facility]  [phone] office, [phone] pager  [email or web address]  Total Time = 25 min, 50% of which was spent in counseling and coordination of care as documented above.

## 2017-09-25 NOTE — MR AVS SNAPSHOT
"              After Visit Summary   9/25/2017    Aden Vaz    MRN: [id number]           Patient Information     Date Of Birth          1960        Visit Information        Provider Department      9/25/2017 2:15 PM Payam Gage MD [facility]        Today's Diagnoses     Chondrosarcoma (H)    -  1    Mechanical complication of prosthetic hip implant, subsequent encounter           Follow-ups after your visit        Who to contact     Please call your clinic at [phone] to:    Ask questions about your health    Make or cancel appointments    Discuss your medicines    Learn about your test results    Speak to your doctor   If you have compliments or concerns about an experience at your clinic, or if you wish to file a complaint, please contact [facility] Patient Relations at [phone] or email us at [email or web address]         Additional Information About Your Visit        MyChart Information     Zip[employer]ot gives you secure access to your electronic health record. If you see a primary care provider, you can also send messages to your care team and make appointments. If you have questions, please call your primary care clinic.  If you do not have a primary care provider, please call [phone] and they will assist you.      Neocis is an electronic gateway that provides easy, online access to your medical records. With [employer], you can request a clinic appointment, read your test results, renew a prescription or communicate with your care team.     To access your existing account, please contact your [facility] or call [phone] for assistance.        Care EveryWhere ID     This is your Care EveryWhere ID. This could be used by other organizations to access your [facility] medical records  [id number]        Your Vitals Were     Height BMI (Body Mass Index)                1.892 m (6' 2.5\") 23.99 kg/m2           " Blood Pressure from Last 3 Encounters:   No data found for BP    Weight from Last 3 Encounters:   No data found for Wt              Today, you had the following     No orders found for display         Today's Medication Changes          These changes are accurate as of: 9/25/17 11:59 PM.  If you have any questions, ask your nurse or doctor.               Stop taking these medicines if you haven't already. Please contact your care team if you have questions.     cefuroxime 250 MG tablet   Commonly known as:  CEFTIN   Stopped by:  Payam Gage MD           gabapentin 300 MG capsule   Commonly known as:  NEURONTIN   Stopped by:  Payam Gage MD           triamcinolone 0.1 % cream   Commonly known as:  KENALOG   Stopped by:  Payam Gage MD                    Primary Care Provider Office Phone # Fax #    Melita ABRAMS MD [place] [phone] [address] [facility] [address]        Equal Access to Services     [facility]: Hadii aad ku hadasho Soomaali, waaxda luqadaha, qaybta kaalmada adeegyada, waxay idiin hayaan adeskinny duque . So [facility] [phone].    ATENCIÓN: Si habla español, tiene a da silva disposición servicios gratuitos de asistencia lingüística. Llame al [phone].    We comply with applicable federal civil rights laws and [place] laws. We do not discriminate on the basis of race, color, national origin, age, disability, sex, sexual orientation, or gender identity.            Thank you!     Thank you for choosing [facility]  for your care. Our goal is always to provide you with excellent care. Hearing back from our patients is one way we can continue to improve our services. Please take a few minutes to complete the written survey that you may receive in the mail after your visit with us. Thank you!             Your Updated Medication List - Protect others around you: Learn how to safely use, store and throw away your medicines at  www.disposemymeds.org.          This list is accurate as of: 9/25/17 11:59 PM.  Always use your most recent med list.                   Brand Name Dispense Instructions for use Diagnosis    ACE NOT PRESCRIBED (INTENTIONAL)     0 each    ACE Inhibitor not prescribed due to Other:    Type 2 diabetes mellitus without complication (H)       amLODIPine 5 MG tablet    NORVASC    90 tablet    Take 1 tablet (5 mg) by mouth every morning    Hypertension goal BP (blood pressure) < 130/80       aspirin 81 MG tablet      Take  by mouth daily.        B-D U/F 31G X 5 MM   Generic drug:  insulin pen needle     300 each    USE 4 DAILY WITH NOVOLOG AND LANTUS PENS AS DIRECTED    Type 2 diabetes mellitus without complication, with long-term current use of insulin (H)       blood glucose monitoring test strip    no brand specified    4 Box    1 strip by In Vitro route 4 times daily (before meals and nightly)    Type 2 diabetes, HbA1c goal < 7% (H)       calcium + D 600-200 MG-UNIT Tabs   Generic drug:  calcium carbonate-vitamin D      1 Tablet daily        insulin lispro 100 UNIT/ML injection    HumaLOG PEN    30 mL    Inject 10 Units Subcutaneous 3 times daily (before meals)    Type 2 diabetes mellitus without complication, with long-term current use of insulin (H)       LANTUS SOLOSTAR 100 UNIT/ML injection   Generic drug:  insulin glargine     15 mL    INJECT 9 UNITS AT BEDTIME    Type 2 diabetes mellitus without complication (H)       * order for DME     1 each    Wheelchair seat cushion    Multiple sclerosis (H), Osteosarcoma (H)       * order for DME     1 Units    Crutch, forearm    Chondrosarcoma (H)       simvastatin 80 MG tablet    ZOCOR    45 tablet    TAKE ONE-HALF TABLET BY MOUTH DAILY    CARDIOVASCULAR SCREENING; LDL GOAL LESS THAN 100       vitamin D3 2000 UNITS Caps      1 CAPSULE DAILY        * Notice:  This list has 2 medication(s) that are the same as other medications prescribed for you. Read the directions  carefully, and ask your doctor or other care provider to review them with you.

## 2017-09-25 NOTE — PROGRESS NOTES
Orthopaedic Oncology and Adult Reconstructive (joint replacement) Surgery   Clinic visit -  Payam Gage M.D.,    Dx:   1. Chondrosarcoma left pelvis  2. Multiple Sclerosis  3. DM     Surgeries:   1. 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 Tavo Gomez.   2. March 12, 2008, removal of fractured left pelvis allograft and reimplantation of second left hemipelvis allograft with internal fixation and revision left total hip arthroplasty, Dr Gage  3. 9/25/2015, Revision failed L hemipelvis allograft/MINNIE composite to custom R hemipelvis and MINNIE prosthesis (Biomet pelvis, vit E poly liner, CoChr 36mm +9 head) with Sacral ala and pubic symphysis fixation.       Mr. Samuels returned today and we reviewed his situation. He is using 2 crutches for ambulation. He has much less pain than was present prior to his last surgical procedure with a custom hemipelvis implant. The anterior pubic fixation has failed and a custom implant has been designed to bridge the symphysis and attached to the contralateral right side. Furthermore, the posterior fixation screws through the sacrum into the right sacral ala have been redesigned with new solid screws available for usage.  On examination his incision is well-healed. He ambulates with 2 crutches.  My recommendation was to proceed with revision internal fixation of his posterior sacral alar junction. In addition I recommended that we dislocated his hip and revised the anterior pubic ramus fixation as well. He understands this dislocation and expected recovery.  We reviewed risks benefits alternatives to the procedure. X-rays were obtained today and are not appreciably different than previous x-rays. Some lucency about the tip of the sacral fixation screws is noted on the right side. However it is not present in the sacrum and this leads me to believe that this may be due to some micromotion at the right sacroiliac junction.  Mr. Vaz  would like some time to think about his management options and whether or not proceed with the above-mentioned surgery. He will contact by Gabriella Aguilera with his decision.    Payam Gage MD  [facility] Family Professor  Oncology and Adult Reconstructive Surgery  Dept Orthopaedic Surgery, [facility]  [phone] office, [phone] pager  [email or web address]  Total Time = 25 min, 50% of which was spent in counseling and coordination of care as documented above.    Answers for HPI/ROS submitted by the patient on 9/19/2017   General Symptoms: Yes  Skin Symptoms: Yes  HENT Symptoms: Yes  EYE SYMPTOMS: Yes  HEART SYMPTOMS: No  LUNG SYMPTOMS: No  INTESTINAL SYMPTOMS: No  URINARY SYMPTOMS: Yes  REPRODUCTIVE SYMPTOMS: No  SKELETAL SYMPTOMS: Yes  BLOOD SYMPTOMS: No  NERVOUS SYSTEM SYMPTOMS: Yes  MENTAL HEALTH SYMPTOMS: Yes  Fever: No  Loss of appetite: No  Weight loss: No  Weight gain: No  Fatigue: Yes  Night sweats: No  Chills: No  Increased stress: Yes  Excessive hunger: No  Excessive thirst: Yes  Feeling hot or cold when others believe the temperature is normal: Yes  Loss of height: No  Post-operative complications: No  Surgical site pain: No  Hallucinations: No  Change in or Loss of Energy: Yes  Hyperactivity: No  Confusion: No  Changes in hair: No  Changes in moles/birth marks: No  Itching: Yes  Rashes: No  Changes in nails: No  Acne: No  Change in facial hair: No  Warts: No  Non-healing sores: No  Scarring: No  Flaking of skin: No  Color changes of hands/feet in cold : No  Sun sensitivity: No  Skin thickening: No  Ear pain: Yes  Ear discharge: No  Hearing loss: No  Tinnitus: No  Nosebleeds: No  Congestion: Yes  Sinus pain: No  Trouble swallowing: No   Voice hoarseness: No  Mouth sores: No  Sore throat: No  Tooth pain: Yes  Gum tenderness: No  Bleeding gums: No  Change in taste: No  Change in sense of smell: No  Dry mouth: Yes  Hearing aid used: No  Neck lump: No  Eye pain: No  Vision loss: No  Dry eyes: No  Watery  eyes: No  Eye bulging: No  Double vision: No  Flashing of lights: No  Spots: No  Floaters: Yes  Redness: Yes  Crossed eyes: No  Tunnel Vision: No  Yellowing of eyes: No  Eye irritation: Yes  Trouble holding urine or incontinence: No  Pain or burning: No  Trouble starting or stopping: Yes  Increased frequency of urination: No  Blood in urine: No  Decreased frequency of urination: No  Frequent nighttime urination: No  Flank pain: No  Difficulty emptying bladder: Yes  Back pain: Yes  Muscle aches: Yes  Neck pain: Yes  Swollen joints: No  Joint pain: Yes  Bone pain: No  Muscle cramps: No  Muscle weakness: Yes  Joint stiffness: Yes  Bone fracture: No  Trouble with coordination: Yes  Dizziness or trouble with balance: Yes  Fainting or black-out spells: No  Memory loss: No  Headache: Yes  Seizures: No  Speech problems: Yes  Tingling: Yes  Tremor: No  Weakness: Yes  Difficulty walking: Yes  Paralysis: No  Numbness: Yes  Nervous or Anxious: Yes  Depression: No  Trouble sleeping: Yes  Trouble thinking or concentrating: Yes  Mood changes: Yes  Panic attacks: No

## 2017-10-09 NOTE — PROGRESS NOTES
"  SUBJECTIVE:                                                    Aden Vaz is a 57 year old male who presents to clinic today for the following health issues:      Diabetes Follow-up      Patient is checking blood sugars: { :353368}    Diabetic concerns: {Diabetic Concerns:923296::\"None\"}     Symptoms of hypoglycemia (low blood sugar): { :049721::\"none\"}     Paresthesias (numbness or burning in feet) or sores: { :018020::\"No\"}     Date of last diabetic eye exam: ***    Hyperlipidemia Follow-Up      Rate your low fat/cholesterol diet?: { :112888::\"good\"}    Taking statin?  { :403325::\"No\"}    Other lipid medications/supplements?:  { :990213::\"none\"}    Hypertension Follow-up      Outpatient blood pressures {ISCHECKIN}    Low Salt Diet: { :322337::\"no added salt\"}        Amount of exercise or physical activity: {Exercise frequency days per week:407953}    Problems taking medications regularly: {Med Problems:396001::\"No\"}    Medication side effects: {CHRONIC MED SIDE EFFECTS:207508::\"none\"}  Diet: { :461830}      {additional problems for provider to add:019189}    Problem list and histories reviewed & adjusted, as indicated.  Additional history: {NONE - AS DOCUMENTED:310882::\"as documented\"}    {HIST REVIEW/ LINKS 2:816617}    {PROVIDER CHARTING PREFERENCE:284449}    "

## 2017-10-11 ENCOUNTER — OFFICE VISIT (OUTPATIENT)
Dept: FAMILY MEDICINE | Facility: CLINIC | Age: 57
End: 2017-10-11
Payer: COMMERCIAL

## 2017-10-11 VITALS
TEMPERATURE: 98.4 F | BODY MASS INDEX: 24.13 KG/M2 | DIASTOLIC BLOOD PRESSURE: 70 MMHG | HEART RATE: 80 BPM | SYSTOLIC BLOOD PRESSURE: 110 MMHG | RESPIRATION RATE: 16 BRPM | WEIGHT: 188 LBS | HEIGHT: 74 IN

## 2017-10-11 DIAGNOSIS — C41.9 CHONDROSARCOMA (H): ICD-10-CM

## 2017-10-11 DIAGNOSIS — E78.5 HYPERLIPIDEMIA LDL GOAL <100: ICD-10-CM

## 2017-10-11 DIAGNOSIS — Z00.01 ENCOUNTER FOR ROUTINE ADULT MEDICAL EXAM WITH ABNORMAL FINDINGS: Primary | ICD-10-CM

## 2017-10-11 DIAGNOSIS — I73.00 RAYNAUD'S DISEASE WITHOUT GANGRENE: ICD-10-CM

## 2017-10-11 DIAGNOSIS — Z23 NEED FOR PROPHYLACTIC VACCINATION AND INOCULATION AGAINST INFLUENZA: ICD-10-CM

## 2017-10-11 DIAGNOSIS — G35 MULTIPLE SCLEROSIS (H): ICD-10-CM

## 2017-10-11 DIAGNOSIS — Z79.4 TYPE 2 DIABETES MELLITUS WITHOUT COMPLICATION, WITH LONG-TERM CURRENT USE OF INSULIN (H): ICD-10-CM

## 2017-10-11 DIAGNOSIS — Z12.5 SCREENING FOR PROSTATE CANCER: ICD-10-CM

## 2017-10-11 DIAGNOSIS — E11.9 TYPE 2 DIABETES MELLITUS WITHOUT COMPLICATION, WITH LONG-TERM CURRENT USE OF INSULIN (H): ICD-10-CM

## 2017-10-11 LAB
CHOLEST SERPL-MCNC: 164 MG/DL
HBA1C MFR BLD: 6.7 % (ref 4.3–6)
HDLC SERPL-MCNC: 48 MG/DL
LDLC SERPL CALC-MCNC: 98 MG/DL
NONHDLC SERPL-MCNC: 116 MG/DL
PSA SERPL-ACNC: 0.59 UG/L (ref 0–4)
TRIGL SERPL-MCNC: 91 MG/DL

## 2017-10-11 PROCEDURE — 99207 C FOOT EXAM  NO CHARGE: CPT | Mod: 25 | Performed by: FAMILY MEDICINE

## 2017-10-11 PROCEDURE — 90686 IIV4 VACC NO PRSV 0.5 ML IM: CPT | Performed by: FAMILY MEDICINE

## 2017-10-11 PROCEDURE — 90471 IMMUNIZATION ADMIN: CPT | Performed by: FAMILY MEDICINE

## 2017-10-11 PROCEDURE — 99396 PREV VISIT EST AGE 40-64: CPT | Mod: 25 | Performed by: FAMILY MEDICINE

## 2017-10-11 PROCEDURE — 36415 COLL VENOUS BLD VENIPUNCTURE: CPT | Performed by: FAMILY MEDICINE

## 2017-10-11 PROCEDURE — G0103 PSA SCREENING: HCPCS | Performed by: FAMILY MEDICINE

## 2017-10-11 PROCEDURE — 83036 HEMOGLOBIN GLYCOSYLATED A1C: CPT | Performed by: FAMILY MEDICINE

## 2017-10-11 PROCEDURE — 80061 LIPID PANEL: CPT | Performed by: FAMILY MEDICINE

## 2017-10-11 ASSESSMENT — PAIN SCALES - GENERAL: PAINLEVEL: MILD PAIN (3)

## 2017-10-11 NOTE — PROGRESS NOTES
SUBJECTIVE:   CC: Aden Vaz is an 57 year old male who presents for preventative health visit.     Healthy Habits:    Do you get at least three servings of calcium containing foods daily (dairy, green leafy vegetables, etc.)? no, taking calcium and/or vitamin D supplement: yes -     Amount of exercise or daily activities, outside of work: 7 day(s) per week- physical therapy - ROM exercise      Problems taking medications regularly No    Medication side effects: No    Have you had an eye exam in the past two years? Yes- July 26 2017    Do you see a dentist twice per year? no    Do you have sleep apnea, excessive snoring or daytime drowsiness?no        Diabetes Follow-up    Patient is checking blood sugars: twice daily.    Blood sugar testing frequency justification: Adjustment of medication(s)  Results are as follows:       am - this morning 142       suppertime - yesterday 89        Diabetic concerns: None     Symptoms of hypoglycemia (low blood sugar): none     Paresthesias (numbness or burning in feet) or sores: Yes - tingling    Date of last diabetic eye exam: 7/26/17    His blood sugars have been well. He is taking more long acting insulin glargine.       Hyperlipidemia Follow-Up      Rate your low fat/cholesterol diet?: fair    Taking statin?  Yes, no muscle aches from statin    Other lipid medications/supplements?:  none    Chondrosarcoma   His surgeon would like for him to have surgery again to put in a new piece to his titanium pelvis. He does not see the point of a new surgery when his parts could break in the future, and he would have to have another surgery then. He was seen on September 25th, 2017. He asked a lot of questions to his surgeon that he was concerned about. He is concerned how doing a 8-12 hour surgery that would then require months of physical therapy to get back to his usual routine would be beneficial with such risks. In 2009, his plate broke. They have replaced some parts.  "Patient relates that he had back problems when he was younger. He has a hard time with full ROM in his shoulders and back. He had carpal tunnel surgery as well.       Mood  His son just got another DUI, and has been in skilled nursing. His car broke down a few weeks ago. His computer broke the other day. Patient expresses that he has been through worse times.       Today's PHQ-2 Score:   PHQ-2 ( 1999 Pf[employer]) 10/11/2017 8/2/2017   Q1: Little interest or pleasure in doing things 0 3   Q2: Feeling down, depressed or hopeless 0 0   PHQ-2 Score 0 3   Q1: Little interest or pleasure in doing things - -   Q2: Feeling down, depressed or hopeless - -   PHQ-2 Score - -         Abuse: Current or Past(Physical, Sexual or Emotional)- No  Do you feel safe in your environment - Yes  Social History   Substance Use Topics     Smoking status: Former Smoker     Packs/day: 1.50     Years: 30.00     Types: Cigarettes     Quit date: 12/10/2002     Smokeless tobacco: Never Used     Alcohol use No      Comment: Sober for 25 years     The patient does not drink >3 drinks per day nor >7 drinks per week.    Last PSA: No results found for: PSA    Reviewed orders with patient. Reviewed health maintenance and updated orders accordingly - Yes  BP Readings from Last 3 Encounters:   10/11/17 110/70   08/02/17 124/76   07/19/17 126/78    Wt Readings from Last 3 Encounters:   10/11/17 85.3 kg (188 lb)   09/25/17 85.9 kg (189 lb 6.4 oz)   08/02/17 86.5 kg (190 lb 11.2 oz)                      Reviewed and updated as needed this visit by clinical staffTobacco  Allergies  Med Hx  Surg Hx  Fam Hx  Soc Hx        Reviewed and updated as needed this visit by Provider        Past Medical History:   Diagnosis Date     Arthritis      Diabetic eye exam (H) 06/09/2014    [facility]     History of blood transfusion      HTN, goal below 130/80 8/5/2014     Malignant neoplasm (H)      Multiple sclerosis (H)     \"in remission\"      Osteosarcoma of " pelvic bone (H)      Type II or unspecified type diabetes mellitus without mention of complication, not stated as uncontrolled     Diabetes mellitus      Past Surgical History:   Procedure Laterality Date     ARTHROPLASTY REVISION HIP Left 9/25/2015    Procedure: ARTHROPLASTY REVISION HIP;  Surgeon: Payam Gage MD;  Location: [facility] OR     BIOPSY  2003     C HAND/FINGER SURGERY UNLISTED       C PELVIS/HIP JOINT SURGERY UNLISTED       COMBINED CYSTOSCOPY, INSERT STENT URETER(S) Left 9/25/2015    Procedure: COMBINED CYSTOSCOPY, INSERT STENT URETER(S);  Surgeon: Jass King MD;  Location: [facility] OR     OPTICAL TRACKING SYSTEM OPEN REDUCTION INTERNAL FIXATION PELVIS Left 9/25/2015    Procedure: OPTICAL TRACKING SYSTEM OPEN REDUCTION INTERNAL FIXATION PELVIS;  Surgeon: Payam Gage MD;  Location: [facility] OR     ORTHOPEDIC SURGERY       PAST SURGICAL HISTORY      several pelvic surgeries for osteosarcoma     RELEASE CARPAL TUNNEL  7/2/2012    Procedure: RELEASE CARPAL TUNNEL;  Right carpal tunnel release;  Surgeon: Mery Link MD;  Location: [facility] OR     SOFT TISSUE SURGERY         ROS:  C: NEGATIVE for fever, chills, change in weight. See HPI above.   I: NEGATIVE for worrisome rashes, moles or lesions. POSITIVE for worrisome rashes. See HPI above.   E: NEGATIVE for vision changes or irritation  ENT: NEGATIVE for ear, mouth and throat problems  R: NEGATIVE for significant cough or SOB  CV: NEGATIVE for chest pain, palpitations or peripheral edema  GI: NEGATIVE for nausea, abdominal pain, heartburn, or change in bowel habits   male: negative for dysuria, hematuria, decreased urinary stream, erectile dysfunction, urethral discharge  M: POSITIVE for significant arthralgias or myalgia. See HPI above.   N: NEGATIVE for weakness, dizziness or paresthesias  P: POSITIVE for changes in mood or affect. See HPI above.    This document serves as a record of the services and decisions personally performed and made by  "Melita Painter MD. It was created on her behalf by Baylee Gibson, a trained medical scribe. The creation of this document is based on the provider's statements to the medical scribe.  Baylee Gibson 2:04 PM October 11, 2017    OBJECTIVE:   /70  Pulse 80  Temp 98.4  F (36.9  C) (Temporal)  Resp 16  Ht 6' 2\" (1.88 m)  Wt 188 lb (85.3 kg)  BMI 24.14 kg/m2  EXAM:  GENERAL: healthy, alert and no distress  EYES: Eyes grossly normal to inspection, PERRL and conjunctivae and sclerae normal  HENT: ear canals and TM's normal, nose and mouth without ulcers or lesions  NECK: no adenopathy, no asymmetry, masses, or scars and thyroid normal to palpation  RESP: lungs clear to auscultation - no rales, rhonchi or wheezes  CV: regular rate and rhythm, normal S1 S2, no S3 or S4, no murmur, click or rub, no peripheral edema and peripheral pulses strong  ABDOMEN: soft, nontender, no hepatosplenomegaly, no masses and bowel sounds normal  MS: no gross musculoskeletal defects noted, no edema  SKIN: no suspicious lesions or rashes  NEURO: Normal strength and tone, mentation intact and speech normal  PSYCH: mentation appears normal, affect normal/bright  Diabetic foot exam: monofilament exam normal, otherwise normal DP and PT pulses, no trophic changes or ulcerative lesions and normal sensory exam    ASSESSMENT/PLAN:   1. Encounter for routine adult medical exam with abnormal findings  Physical exam completed. Flu shot given today.     2. Chondrosarcoma (H)  Patient expresses that his surgeon would like for him to have surgery to place a new part. He is hesitant due to risks. Advised patient that it sounds like he has thought through the pros and cons of the surgery. No change to care.     3. Type 2 diabetes mellitus without complication, with long-term current use of insulin (H)  Awaiting results. Dose adjustment as needed. Refilled Insulin glargine.   - Lipid panel reflex to direct LDL  - Hemoglobin A1c  - FOOT EXAM  - insulin " "glargine (LANTUS SOLOSTAR) 100 UNIT/ML injection; Inject 11 Units Subcutaneous At Bedtime  Dispense: 15 mL; Refill: 0    4. Multiple sclerosis (H)  No change to care.  Continue follow up with neurology    5. Hyperlipidemia LDL goal <100  Awaiting results. Dose adjustment as needed.   - Lipid panel reflex to direct LDL    6. Screening for prostate cancer  Awaiting results. Will notify of results.   - PSA, screen    7. Raynaud's disease without gangrene  Patient is stable, will continue treatment with Norvasc 5mg.       COUNSELING:  Reviewed preventive health counseling, as reflected in patient instructions       Regular exercise       Healthy diet/nutrition       Vision screening       Hearing screening       Immunizations    Vaccinated for: Influenza           Consider Hep C screening for patients born between 1945 and 1965       Colon cancer screening       Prostate cancer screening       Shingles       reports that he quit smoking about 14 years ago. His smoking use included Cigarettes. He has a 45.00 pack-year smoking history. He has never used smokeless tobacco.      Estimated body mass index is 24.14 kg/(m^2) as calculated from the following:    Height as of this encounter: 6' 2\" (1.88 m).    Weight as of this encounter: 188 lb (85.3 kg).     Counseling Resources:  ATP IV Guidelines  Pooled Cohorts Equation Calculator  FRAX Risk Assessment  ICSI Preventive Guidelines  Dietary Guidelines for Americans, 2010  USDA's MyPlate  ASA Prophylaxis  Lung CA Screening    Follow Up: Return to clinic in 6 months for medication check.     All questions invited, asked and answered to the patient's apparent satisfaction.  Patient agrees to plan.     The information in this document, created by the medical scribe for me, accurately reflects the services I personally performed and the decisions made by me. I have reviewed and approved this document for accuracy prior to leaving the patient care area.  October 11, 2017 2:04 " DONALD DU MD, MD  [facility]  Injectable Influenza Immunization Documentation      Prior to injection verified patient identity using patient's name and date of birth.    1.  Is the person to be vaccinated sick today?   No    2. Does the person to be vaccinated have an allergy to a component   of the vaccine?   No    3. Has the person to be vaccinated ever had a serious reaction   to influenza vaccine in the past?   No    4. Has the person to be vaccinated ever had Guillain-Barré syndrome?   No    Form completed by Antonio Sales MA

## 2017-10-11 NOTE — MR AVS SNAPSHOT
After Visit Summary   10/11/2017    Aden Vaz    MRN: [id number]           Patient Information     Date Of Birth          1960        Visit Information        Provider Department      10/11/2017 1:20 PM Melita Painter MD [facility]        Today's Diagnoses     Encounter for routine adult medical exam with abnormal findings    -  1    Chondrosarcoma (H)        Type 2 diabetes mellitus without complication, with long-term current use of insulin (H)        Multiple sclerosis (H)        Hyperlipidemia LDL goal <100        Screening for prostate cancer        Raynaud's disease without gangrene          Care Instructions      Preventive Health Recommendations  Male Ages 50 - 64    Yearly exam:             See your health care provider every year in order to  o   Review health changes.   o   Discuss preventive care.    o   Review your medicines if your doctor has prescribed any.     Have a cholesterol test every 5 years, or more frequently if you are at risk for high cholesterol/heart disease.     Have a diabetes test (fasting glucose) every three years. If you are at risk for diabetes, you should have this test more often.     Have a colonoscopy at age 50, or have a yearly FIT test (stool test). These exams will check for colon cancer.      Talk with your health care provider about whether or not a prostate cancer screening test (PSA) is right for you.    You should be tested each year for STDs (sexually transmitted diseases), if you re at risk.     Shots: Get a flu shot each year. Get a tetanus shot every 10 years.     Nutrition:    Eat at least 5 servings of fruits and vegetables daily.     Eat whole-grain bread, whole-wheat pasta and brown rice instead of white grains and rice.     Talk to your provider about Calcium and Vitamin D.     Lifestyle    Exercise for at least 150 minutes a week (30 minutes a day, 5 days a week). This will help you control your weight and prevent  "disease.     Limit alcohol to one drink per day.     No smoking.     Wear sunscreen to prevent skin cancer.     See your dentist every six months for an exam and cleaning.     See your eye doctor every 1 to 2 years.            Follow-ups after your visit        Who to contact     If you have questions or need follow up information about today's clinic visit or your schedule please contact [facility] directly at [phone].  Normal or non-critical lab and imaging results will be communicated to you by [employer], letter or phone within 4 business days after the clinic has received the results. If you do not hear from us within 7 days, please contact the clinic through [employer] or phone. If you have a critical or abnormal lab result, we will notify you by phone as soon as possible.  Submit refill requests through [employer] or call your pharmacy and they will forward the refill request to us. Please allow 3 business days for your refill to be completed.          Additional Information About Your Visit        [employer] Information     [employer] gives you secure access to your electronic health record. If you see a primary care provider, you can also send messages to your care team and make appointments. If you have questions, please call your primary care clinic.  If you do not have a primary care provider, please call [phone] and they will assist you.        Care EveryWhere ID     This is your Care EveryWhere ID. This could be used by other organizations to access your [place] medical records  [id number]        Your Vitals Were     Pulse Temperature Respirations Height BMI (Body Mass Index)       80 98.4  F (36.9  C) (Temporal) 16 6' 2\" (1.88 m) 24.14 kg/m2        Blood Pressure from Last 3 Encounters:   10/11/17 110/70   08/02/17 124/76   07/19/17 126/78    Weight from Last 3 Encounters:   10/11/17 188 lb (85.3 kg)   09/25/17 189 lb 6.4 oz (85.9 kg)   08/02/17 190 lb 11.2 oz (86.5 kg)              We " Performed the Following     FOOT EXAM     Hemoglobin A1c     Lipid panel reflex to direct LDL     PSA, screen          Today's Medication Changes          These changes are accurate as of: 10/11/17  2:17 PM.  If you have any questions, ask your nurse or doctor.               These medicines have changed or have updated prescriptions.        Dose/Directions    insulin glargine 100 UNIT/ML injection   Commonly known as:  LANTUS SOLOSTAR   This may have changed:  See the new instructions.   Used for:  Type 2 diabetes mellitus without complication, with long-term current use of insulin (H)   Changed by:  Melita Painter MD        Dose:  11 Units   Inject 11 Units Subcutaneous At Bedtime   Quantity:  15 mL   Refills:  0            Where to get your medicines      Some of these will need a paper prescription and others can be bought over the counter.  Ask your nurse if you have questions.     You don't need a prescription for these medications     insulin glargine 100 UNIT/ML injection                Primary Care Provider Office Phone # Fax #    Melita Painter [phone] [address] [facility] [address]        Equal Access to Services     [facility]: Hadii aad ku hadasho Soomaali, waaxda luqadaha, qaybta kaalmada adeegyada, waxay idiin hayzack duque . So [facility] [phone].    ATENCIÓN: Si habla español, tiene a da silva disposición servicios gratuitos de asistencia lingüística. Llame al [phone].    We comply with applicable federal civil rights laws and [place] laws. We do not discriminate on the basis of race, color, national origin, age, disability, sex, sexual orientation, or gender identity.            Thank you!     Thank you for choosing [facility]  for your care. Our goal is always to provide you with excellent care. Hearing back from our patients is one way we can continue to improve our services. Please take a few minutes to complete the written  survey that you may receive in the mail after your visit with us. Thank you!             Your Updated Medication List - Protect others around you: Learn how to safely use, store and throw away your medicines at www.disposemymeds.org.          This list is accurate as of: 10/11/17  2:17 PM.  Always use your most recent med list.                   Brand Name Dispense Instructions for use Diagnosis    ACE NOT PRESCRIBED (INTENTIONAL)     0 each    ACE Inhibitor not prescribed due to Other:    Type 2 diabetes mellitus without complication (H)       amLODIPine 5 MG tablet    NORVASC    90 tablet    Take 1 tablet (5 mg) by mouth every morning    Hypertension goal BP (blood pressure) < 130/80       aspirin 81 MG tablet      Take  by mouth daily.        B-D U/F 31G X 5 MM   Generic drug:  insulin pen needle     300 each    USE 4 DAILY WITH NOVOLOG AND LANTUS PENS AS DIRECTED    Type 2 diabetes mellitus without complication, with long-term current use of insulin (H)       blood glucose monitoring test strip    no brand specified    4 Box    1 strip by In Vitro route 4 times daily (before meals and nightly)    Type 2 diabetes, HbA1c goal < 7% (H)       calcium + D 600-200 MG-UNIT Tabs   Generic drug:  calcium carbonate-vitamin D      1 Tablet daily        insulin glargine 100 UNIT/ML injection    LANTUS SOLOSTAR    15 mL    Inject 11 Units Subcutaneous At Bedtime    Type 2 diabetes mellitus without complication, with long-term current use of insulin (H)       insulin lispro 100 UNIT/ML injection    HumaLOG PEN    30 mL    Inject 10 Units Subcutaneous 3 times daily (before meals)    Type 2 diabetes mellitus without complication, with long-term current use of insulin (H)       * order for DME     1 each    Wheelchair seat cushion    Multiple sclerosis (H), Osteosarcoma (H)       * order for DME     1 Units    Crutch, forearm    Chondrosarcoma (H)       simvastatin 80 MG tablet    ZOCOR    45 tablet    TAKE ONE-HALF TABLET BY  MOUTH DAILY    CARDIOVASCULAR SCREENING; LDL GOAL LESS THAN 100       vitamin D3 2000 UNITS Caps      1 CAPSULE DAILY        * Notice:  This list has 2 medication(s) that are the same as other medications prescribed for you. Read the directions carefully, and ask your doctor or other care provider to review them with you.

## 2017-10-11 NOTE — NURSING NOTE
"Chief Complaint   Patient presents with     Panel Management     flu, lipids, A1c, foot exam     RECHECK       Initial /70  Pulse 80  Temp 98.4  F (36.9  C) (Temporal)  Resp 16  Ht 6' 2\" (1.88 m)  Wt 188 lb (85.3 kg)  BMI 24.14 kg/m2 Estimated body mass index is 24.14 kg/(m^2) as calculated from the following:    Height as of this encounter: 6' 2\" (1.88 m).    Weight as of this encounter: 188 lb (85.3 kg).  Medication Reconciliation: complete     Antonio Sales MA    "

## 2017-10-25 DIAGNOSIS — Z79.4 TYPE 2 DIABETES MELLITUS WITHOUT COMPLICATION, WITH LONG-TERM CURRENT USE OF INSULIN (H): ICD-10-CM

## 2017-10-25 DIAGNOSIS — E11.9 TYPE 2 DIABETES MELLITUS WITHOUT COMPLICATION, WITH LONG-TERM CURRENT USE OF INSULIN (H): ICD-10-CM

## 2017-10-30 NOTE — TELEPHONE ENCOUNTER
Prescription approved per [facility] Refill Protocol.  Bruce Chowdary, RN, BSN

## 2017-11-11 DIAGNOSIS — Z13.6 CARDIOVASCULAR SCREENING; LDL GOAL LESS THAN 100: ICD-10-CM

## 2017-11-14 RX ORDER — SIMVASTATIN 80 MG
TABLET ORAL
Qty: 45 TABLET | Refills: 2 | Status: SHIPPED | OUTPATIENT
Start: 2017-11-14 | End: 2018-08-13

## 2017-11-14 NOTE — TELEPHONE ENCOUNTER
Requested Prescriptions   Pending Prescriptions Disp Refills     simvastatin (ZOCOR) 80 MG tablet [Pharmacy Med Name: SIMVASTATIN 80MG TABLETS] 45 tablet 0     Sig: TAKE ONE-HALF TABLET BY MOUTH DAILY    Statins Protocol Passed    11/11/2017  1:35 PM       Passed - LDL on file in past 12 months    Recent Labs   Lab Test  10/11/17   1427   LDL  98            Passed - No abnormal creatine kinase in past 12 months    No lab results found.         Passed - Recent or future visit with authorizing provider    Patient had office visit in the last year or has a visit in the next 30 days with authorizing provider.  See chart review.     Last ov 10/11/2017         Passed - Patient is age 18 or older        Prescription approved per [facility] Refill Protocol.  Bruce Chowdary, RN, BSN

## 2017-11-17 ENCOUNTER — TELEPHONE (OUTPATIENT)
Dept: ORTHOPEDICS | Facility: CLINIC | Age: 57
End: 2017-11-17

## 2017-11-17 NOTE — TELEPHONE ENCOUNTER
I called Aden to ask if had given further consideration to picking a surgery date for the revision pelvis and left hip.  After some thought and discussions with him primary and family members, he has decided that he sees no clear benefit to having another big surgery at this time.  He has co-morbidities which he believes will make recovery far more difficult this time. He doesn't believe that his mobility or activity will improve. I told him that I would speak with Dr. Gage abut his decision.

## 2017-12-15 ENCOUNTER — TELEPHONE (OUTPATIENT)
Dept: FAMILY MEDICINE | Facility: CLINIC | Age: 57
End: 2017-12-15

## 2017-12-15 DIAGNOSIS — Z12.11 SPECIAL SCREENING FOR MALIGNANT NEOPLASMS, COLON: Primary | ICD-10-CM

## 2017-12-15 NOTE — TELEPHONE ENCOUNTER
Summary:    Patient is due/failing the following:   FIT    Action needed:   Complete a FIT test     Type of outreach:    Phone, spoke to patient.  patient will complete a FIT test. FIT test ordered and mailed to patient    Questions for provider review:    None                                                                                                                                    Jo-Ann Gomez       Chart routed to Care Team .      Panel Management Review      Patient has the following on his problem list:     Diabetes    ASA: Passed    Last A1C  Lab Results   Component Value Date    A1C 6.7 10/11/2017    A1C 6.9 04/11/2017    A1C 6.7 10/04/2016    A1C 7.1 03/23/2016    A1C 6.7 09/26/2015     A1C tested: Passed    Last LDL:    Lab Results   Component Value Date    CHOL 164 10/11/2017     Lab Results   Component Value Date    HDL 48 10/11/2017     Lab Results   Component Value Date    LDL 98 10/11/2017     Lab Results   Component Value Date    TRIG 91 10/11/2017     Lab Results   Component Value Date    CHOLHDLRATIO 3.6 09/10/2015     Lab Results   Component Value Date    NHDL 116 10/11/2017       Is the patient on a Statin? YES             Is the patient on Aspirin? YES    Medications     HMG CoA Reductase Inhibitors    simvastatin (ZOCOR) 80 MG tablet    Salicylates    aspirin 81 MG tablet          Last three blood pressure readings:  BP Readings from Last 3 Encounters:   10/11/17 110/70   08/02/17 124/76   07/19/17 126/78          Tobacco History:     History   Smoking Status     Former Smoker     Packs/day: 1.50     Years: 30.00     Types: Cigarettes     Quit date: 12/10/2002   Smokeless Tobacco     Never Used             Composite cancer screening  Chart review shows that this patient is due/due soon for the following Fecal Colorectal (FIT)

## 2018-01-04 DIAGNOSIS — E11.9 TYPE 2 DIABETES MELLITUS WITHOUT COMPLICATION (H): ICD-10-CM

## 2018-01-05 RX ORDER — INSULIN GLARGINE 100 [IU]/ML
INJECTION, SOLUTION SUBCUTANEOUS
Qty: 15 ML | Refills: 1 | Status: SHIPPED | OUTPATIENT
Start: 2018-01-05 | End: 2018-04-24

## 2018-01-05 NOTE — TELEPHONE ENCOUNTER
Requested Prescriptions   Pending Prescriptions Disp Refills     LANTUS SOLOSTAR 100 UNIT/ML soln [Pharmacy Med Name: LANTUS SOLOSTAR PEN INJ 5 X 3ML] 15 mL 0     Sig: INJECT 9 UNITS AT BEDTIME    Long Acting Insulin Protocol Passed    1/4/2018 11:36 AM       Passed - Blood pressure less than 140/90 in past 6 months    BP Readings from Last 3 Encounters:   10/11/17 110/70   08/02/17 124/76   07/19/17 126/78                Passed - LDL on file in past 12 months    Recent Labs   Lab Test  10/11/17   1427   LDL  98            Passed - Microalbumin on file in past 12 months    Recent Labs   Lab Test  04/11/17   1625   MICROL  <5   UMALCR  Unable to calculate due to low value            Passed - Serum creatinine on file in past 12 months    Recent Labs   Lab Test  04/11/17   1624   CR  0.96            Passed - HgbA1C in past 3 or 6 months    Recent Labs   Lab Test  10/11/17   1427   A1C  6.7*            Passed - Patient is age 18 or older       Passed - Recent visit with authorizing provider's specialty in past 6 months    Patient had office visit in the last 6 months or has a visit in the next 30 days with authorizing provider.  See chart review.     Last OV: 10/11/17        Prescription approved per [facility] Refill Protocol.    Hussein Hickman RN, BSN

## 2018-01-25 PROCEDURE — 82274 ASSAY TEST FOR BLOOD FECAL: CPT | Performed by: FAMILY MEDICINE

## 2018-01-26 DIAGNOSIS — Z12.11 SPECIAL SCREENING FOR MALIGNANT NEOPLASMS, COLON: ICD-10-CM

## 2018-01-28 LAB — HEMOCCULT STL QL IA: NEGATIVE

## 2018-03-20 DIAGNOSIS — E11.9 TYPE 2 DIABETES, HBA1C GOAL < 7% (H): ICD-10-CM

## 2018-03-21 NOTE — TELEPHONE ENCOUNTER
"Requested Prescriptions   Pending Prescriptions Disp Refills     DARIN CONTOUR test strip [Pharmacy Med Name: CONTOUR TEST STRIPS 100'S] 400 strip 0     Sig: USE TO TEST FOUR TIMES DAILY    Diabetic Supplies Protocol Passed    3/20/2018 10:29 AM       Passed - Patient is 18 years of age or older       Passed - Recent (6 mo) or future (30 days) visit within the authorizing provider's specialty    Patient had office visit in the last 6 months or has a visit in the next 30 days with authorizing provider.  See \"Patient Info\" tab in inbasket, or \"Choose Columns\" in Meds & Orders section of the refill encounter.            Prescription approved per [facility] Refill Protocol.    Pearl Puri RN    "

## 2018-03-29 DIAGNOSIS — E11.9 TYPE 2 DIABETES MELLITUS WITHOUT COMPLICATION, WITH LONG-TERM CURRENT USE OF INSULIN (H): ICD-10-CM

## 2018-03-29 DIAGNOSIS — Z79.4 TYPE 2 DIABETES MELLITUS WITHOUT COMPLICATION, WITH LONG-TERM CURRENT USE OF INSULIN (H): ICD-10-CM

## 2018-03-30 RX ORDER — INSULIN LISPRO 100 [IU]/ML
INJECTION, SOLUTION INTRAVENOUS; SUBCUTANEOUS
Qty: 30 ML | Refills: 0 | Status: SHIPPED | OUTPATIENT
Start: 2018-03-30 | End: 2018-07-23

## 2018-03-30 NOTE — TELEPHONE ENCOUNTER
insulin lispro (HUMALOG PEN) 100 UNIT/ML injection  Medication is being filled for 1 time refill only due to:  Patient needs to be seen because due for 6 month follow up 04/2018.   Please assist with scheduling.    Soheila Vaz RN, BSN

## 2018-04-18 NOTE — PROGRESS NOTES
"  SUBJECTIVE:   Aden Vaz is a 57 year old male who presents to clinic today for the following health issues:    HPI    Diabetes Follow-up    Patient is checking blood sugars: twice daily.    Blood sugar testing frequency justification: insulin use  Results are as follows:         am - 80's         bedtime - 120's    Diabetic concerns: None     Symptoms of hypoglycemia (low blood sugar): shaky, dizzy, weak     Paresthesias (numbness or burning in feet) or sores: Yes \"a little bit\"     Date of last diabetic eye exam: July 2017    His blood sugars have been fairly good, still has his highs and lows. His insurance is no longer going to cover Lantus, and advised him to switch to Basalgar. Currently on 10 units of Lantus. He decreased the dose from 11 to 10 because he was having too many lows. When he has low blood sugars he feels very fatigued, and has little energy. He carries snacks with him for when he gets too low.     Mood  He has been having more feelings of being down and depressed. He is depressed that he no longer contributes to society because of he is disabled now, and didn't use to be. He is also sad that he hasn't been able to see his daughter this winter because of the weather. Life hasn't been too bad however to make him think that that he is depressed in general, he just has good and bad days. He was able to stay healthy this winter, didn't get any colds or the flu.     Urinary Difficulty  He has a good report that his past urinary trouble has improved with changing some of his urination habits. He is now only going to the bathroom when he feels his bladder is very full so he can empty it completely, instead of trying multiple times to get small amounts out. After 5 months of trying to be conscious of this he can make it through the night now without waking to go to the bathroom.     BP Readings from Last 2 Encounters:   10/11/17 110/70   08/02/17 124/76     Hemoglobin A1C (%)   Date Value " "  10/11/2017 6.7 (H)   04/11/2017 6.9 (H)     LDL Cholesterol Calculated (mg/dL)   Date Value   10/11/2017 98   10/04/2016 98     Hyperlipidemia Follow-Up      Rate your low fat/cholesterol diet?: fair    Taking statin?  Yes, no muscle aches from statin    Other lipid medications/supplements?:  none    Reports that he has muscle aches from his other medical problems, doesn't associate them with use of the statin.      Problem list and histories reviewed & adjusted, as indicated.  Additional history: as documented    BP Readings from Last 3 Encounters:   04/24/18 108/68   10/11/17 110/70   08/02/17 124/76    Wt Readings from Last 3 Encounters:   04/24/18 80.1 kg (176 lb 8 oz)   10/11/17 85.3 kg (188 lb)   09/25/17 85.9 kg (189 lb 6.4 oz)         ROS:  CONSTITUTIONAL: NEGATIVE for fever, chills, change in weight  ENT/MOUTH: NEGATIVE for ear, mouth and throat problems  RESP: NEGATIVE for significant cough or SOB  CV: NEGATIVE for chest pain, palpitations or peripheral edema  : POSITIVE for decreased urinary stream and retention  MUSCULOSKELETAL: POSITIVE Hx bone cancer and MS, lack of mobility, muscle and joint pains  NEURO: POSITIVE Hx MS  PSYCHIATRIC: POSITIVE for depressed mood, fatigue and feelings of worthlessness/guilt    This document serves as a record of the services and decisions personally performed and made by Melita Painter MD. It was created on her behalf by Marylin Boyd, a trained medical scribe. The creation of this document is based the provider's statements to the medical scribe.  Marylin Boyd, April 24, 2018 1:25 PM     OBJECTIVE:     /68  Pulse 70  Temp 97.7  F (36.5  C) (Temporal)  Resp 18  Ht 1.88 m (6' 2\")  Wt 80.1 kg (176 lb 8 oz)  SpO2 98%  BMI 22.66 kg/m2  Body mass index is 22.66 kg/(m^2).  GENERAL: healthy, alert and no distress  EYES: Eyes grossly normal to inspection, PERRL and conjunctivae and sclerae normal  HENT: ear canals and TM's normal, nose and mouth without ulcers or " "lesions  NECK: no adenopathy, no asymmetry, masses, or scars and thyroid normal to palpation  RESP: lungs clear to auscultation - no rales, rhonchi or wheezes  CV: regular rate and rhythm, normal S1 S2, no S3 or S4, no murmur, click or rub, no peripheral edema and peripheral pulses strong  MS: no gross musculoskeletal defects noted, no edema, uses crutches for mobility  SKIN: no suspicious lesions or rashes to visible skin  PSYCH: mentation appears normal, affect normal/bright    Diagnostic Test Results:  No results found for this or any previous visit (from the past 24 hour(s)).    ASSESSMENT/PLAN:         1. Type 2 diabetes mellitus without complication, with long-term current use of insulin (H)  Will switch to same dosage of Basaglar, advised that for some patients they might need to increase the dosage when switching, monitor blood sugar closely while switching over to determine if he needs to increase.   Will change his norvasc to lisinopril due to his diabetes.   - COMPREHENSIVE METABOLIC PANEL  - HEMOGLOBIN A1C  - Albumin Random Urine Quantitative with Creat Ratio  - BASAGLAR 100 UNIT/ML injection; Inject 10 Units Subcutaneous daily  Dispense: 15 mL; Refill: 1  - lisinopril (PRINIVIL/ZESTRIL) 10 MG tablet; Take 1 tablet (10 mg) by mouth daily  Dispense: 30 tablet; Refill: 5    2. Multiple sclerosis (H)  Continue care with neurology.   He is doing well at this time.  He has rare flare ups.     3. Chondrosarcoma (H)  He does not plan further surgeries at this time for his pelvic issues.     4. CARDIOVASCULAR SCREENING; LDL GOAL LESS THAN 100  Pt doing well on current medication and treatment plan. No change at this time.    Mood change  Discussed with patient. When asked whether he would like to consider medication or counseling, he stated \"I just had a bad morning\" and declines any further evaluation or treatment.   Recheck as needed.       All questions invited, asked and answered to the patient's apparent " satisfaction.  Patient agrees to plan.       The information in this document, created by the medical scribe for me, accurately reflects the services I personally performed and the decisions made by me. I have reviewed and approved this document for accuracy.     DONALD CALDERON MD, MD  [facility]

## 2018-04-24 ENCOUNTER — OFFICE VISIT (OUTPATIENT)
Dept: FAMILY MEDICINE | Facility: CLINIC | Age: 58
End: 2018-04-24
Payer: COMMERCIAL

## 2018-04-24 VITALS
DIASTOLIC BLOOD PRESSURE: 68 MMHG | WEIGHT: 176.5 LBS | RESPIRATION RATE: 18 BRPM | HEART RATE: 70 BPM | TEMPERATURE: 97.7 F | SYSTOLIC BLOOD PRESSURE: 108 MMHG | BODY MASS INDEX: 22.65 KG/M2 | HEIGHT: 74 IN | OXYGEN SATURATION: 98 %

## 2018-04-24 DIAGNOSIS — Z13.6 CARDIOVASCULAR SCREENING; LDL GOAL LESS THAN 100: ICD-10-CM

## 2018-04-24 DIAGNOSIS — C41.9 CHONDROSARCOMA (H): ICD-10-CM

## 2018-04-24 DIAGNOSIS — R45.86 MOOD CHANGE: ICD-10-CM

## 2018-04-24 DIAGNOSIS — E11.9 TYPE 2 DIABETES MELLITUS WITHOUT COMPLICATION, WITH LONG-TERM CURRENT USE OF INSULIN (H): Primary | ICD-10-CM

## 2018-04-24 DIAGNOSIS — Z79.4 TYPE 2 DIABETES MELLITUS WITHOUT COMPLICATION, WITH LONG-TERM CURRENT USE OF INSULIN (H): Primary | ICD-10-CM

## 2018-04-24 DIAGNOSIS — G35 MULTIPLE SCLEROSIS (H): ICD-10-CM

## 2018-04-24 LAB
ALBUMIN SERPL-MCNC: 3.7 G/DL (ref 3.4–5)
ALP SERPL-CCNC: 87 U/L (ref 40–150)
ALT SERPL W P-5'-P-CCNC: 44 U/L (ref 0–70)
ANION GAP SERPL CALCULATED.3IONS-SCNC: 6 MMOL/L (ref 3–14)
AST SERPL W P-5'-P-CCNC: 32 U/L (ref 0–45)
BILIRUB SERPL-MCNC: 0.3 MG/DL (ref 0.2–1.3)
BUN SERPL-MCNC: 15 MG/DL (ref 7–30)
CALCIUM SERPL-MCNC: 9.3 MG/DL (ref 8.5–10.1)
CHLORIDE SERPL-SCNC: 102 MMOL/L (ref 94–109)
CO2 SERPL-SCNC: 28 MMOL/L (ref 20–32)
CREAT SERPL-MCNC: 0.95 MG/DL (ref 0.66–1.25)
CREAT UR-MCNC: 30 MG/DL
GFR SERPL CREATININE-BSD FRML MDRD: 81 ML/MIN/1.7M2
GLUCOSE SERPL-MCNC: 145 MG/DL (ref 70–99)
HBA1C MFR BLD: 6.6 % (ref 0–5.6)
MICROALBUMIN UR-MCNC: <5 MG/L
MICROALBUMIN/CREAT UR: NORMAL MG/G CR (ref 0–17)
POTASSIUM SERPL-SCNC: 4.1 MMOL/L (ref 3.4–5.3)
PROT SERPL-MCNC: 7.5 G/DL (ref 6.8–8.8)
SODIUM SERPL-SCNC: 136 MMOL/L (ref 133–144)

## 2018-04-24 PROCEDURE — 80053 COMPREHEN METABOLIC PANEL: CPT | Performed by: FAMILY MEDICINE

## 2018-04-24 PROCEDURE — 99214 OFFICE O/P EST MOD 30 MIN: CPT | Performed by: FAMILY MEDICINE

## 2018-04-24 PROCEDURE — 82043 UR ALBUMIN QUANTITATIVE: CPT | Performed by: FAMILY MEDICINE

## 2018-04-24 PROCEDURE — 36415 COLL VENOUS BLD VENIPUNCTURE: CPT | Performed by: FAMILY MEDICINE

## 2018-04-24 PROCEDURE — 83036 HEMOGLOBIN GLYCOSYLATED A1C: CPT | Performed by: FAMILY MEDICINE

## 2018-04-24 RX ORDER — INSULIN GLARGINE 100 [IU]/ML
10 INJECTION, SOLUTION SUBCUTANEOUS DAILY
Qty: 15 ML | Refills: 1 | Status: SHIPPED | OUTPATIENT
Start: 2018-04-24 | End: 2019-01-21

## 2018-04-24 RX ORDER — LISINOPRIL 10 MG/1
10 TABLET ORAL DAILY
Qty: 30 TABLET | Refills: 5 | Status: SHIPPED | OUTPATIENT
Start: 2018-04-24 | End: 2018-11-07

## 2018-04-24 ASSESSMENT — PAIN SCALES - GENERAL: PAINLEVEL: NO PAIN (0)

## 2018-04-24 NOTE — MR AVS SNAPSHOT
After Visit Summary   4/24/2018    Aden Vaz    MRN: [id number]           Patient Information     Date Of Birth          1960        Visit Information        Provider Department      4/24/2018 1:20 PM Melita Painter MD [facility]        Today's Diagnoses     Type 2 diabetes mellitus without complication, with long-term current use of insulin (H)    -  1    Multiple sclerosis (H)        Chondrosarcoma (H)        CARDIOVASCULAR SCREENING; LDL GOAL LESS THAN 100           Follow-ups after your visit        Follow-up notes from your care team     Return in about 6 months (around 10/24/2018).      Who to contact     If you have questions or need follow up information about today's clinic visit or your schedule please contact [facility] [place] directly at [phone].  Normal or non-critical lab and imaging results will be communicated to you by MyChart, letter or phone within 4 business days after the clinic has received the results. If you do not hear from us within 7 days, please contact the clinic through MyChart or phone. If you have a critical or abnormal lab result, we will notify you by phone as soon as possible.  Submit refill requests through [employer] or call your pharmacy and they will forward the refill request to us. Please allow 3 business days for your refill to be completed.          Additional Information About Your Visit        MyChart Information     [employer] gives you secure access to your electronic health record. If you see a primary care provider, you can also send messages to your care team and make appointments. If you have questions, please call your primary care clinic.  If you do not have a primary care provider, please call [phone] and they will assist you.        Care EveryWhere ID     This is your Care EveryWhere ID. This could be used by other organizations to access your [facility] medical records  [id number]        Your Vitals Were      "Pulse Temperature Respirations Height Pulse Oximetry BMI (Body Mass Index)    70 97.7  F (36.5  C) (Temporal) 18 6' 2\" (1.88 m) 98% 22.66 kg/m2       Blood Pressure from Last 3 Encounters:   04/24/18 108/68   10/11/17 110/70   08/02/17 124/76    Weight from Last 3 Encounters:   04/24/18 176 lb 8 oz (80.1 kg)   10/11/17 188 lb (85.3 kg)   09/25/17 189 lb 6.4 oz (85.9 kg)              We Performed the Following     Albumin Random Urine Quantitative with Creat Ratio     COMPREHENSIVE METABOLIC PANEL     HEMOGLOBIN A1C          Today's Medication Changes          These changes are accurate as of 4/24/18  1:50 PM.  If you have any questions, ask your nurse or doctor.               Start taking these medicines.        Dose/Directions    lisinopril 10 MG tablet   Commonly known as:  PRINIVIL/ZESTRIL   Used for:  Type 2 diabetes mellitus without complication, with long-term current use of insulin (H)   Started by:  Melita Painter MD        Dose:  10 mg   Take 1 tablet (10 mg) by mouth daily   Quantity:  30 tablet   Refills:  5         These medicines have changed or have updated prescriptions.        Dose/Directions    BASAGLAR 100 UNIT/ML injection   This may have changed:    - how much to take  - when to take this  - Another medication with the same name was removed. Continue taking this medication, and follow the directions you see here.   Used for:  Type 2 diabetes mellitus without complication, with long-term current use of insulin (H)   Changed by:  Melita Painter MD        Dose:  10 Units   Inject 10 Units Subcutaneous daily   Quantity:  15 mL   Refills:  1         Stop taking these medicines if you haven't already. Please contact your care team if you have questions.     ACE NOT PRESCRIBED (INTENTIONAL)   Stopped by:  Melita Painter MD           amLODIPine 5 MG tablet   Commonly known as:  NORVASC   Stopped by:  Melita Painter MD                Where to get your medicines      These medications were " sent to [id number] - [place] - [address]  [address], [place] [address]    Hours:  test fax sent successfully 1/20/04   Phone:  [phone]     BASAGLAR 100 UNIT/ML injection    lisinopril 10 MG tablet                Primary Care Provider Office Phone # Fax #    Melita Painter [phone] [address] [facility]  [place] [address]        Equal Access to Services     [facility]: Hadii aad ku hadasho Soomaali, waaxda luqadaha, qaybta kaalmada adeegyada, waxay idiin hayaan adeeg kharash laMarilynnaan . So [facility] [phone].    ATENCIÓN: Si habla español, tiene a da silva disposición servicios gratuitos de asistencia lingüística. [facility] [phone].    We comply with applicable federal civil rights laws and [place] laws. We do not discriminate on the basis of race, color, national origin, age, disability, sex, sexual orientation, or gender identity.            Thank you!     Thank you for choosing [facility] [place]  for your care. Our goal is always to provide you with excellent care. Hearing back from our patients is one way we can continue to improve our services. Please take a few minutes to complete the written survey that you may receive in the mail after your visit with us. Thank you!             Your Updated Medication List - Protect others around you: Learn how to safely use, store and throw away your medicines at www.disposemymeds.org.          This list is accurate as of 4/24/18  1:50 PM.  Always use your most recent med list.                   Brand Name Dispense Instructions for use Diagnosis    aspirin 81 MG tablet      Take  by mouth daily.        BASAGLAR 100 UNIT/ML injection     15 mL    Inject 10 Units Subcutaneous daily    Type 2 diabetes mellitus without complication, with long-term current use of insulin (H)       DARIN CONTOUR test strip   Generic drug:  blood glucose monitoring     400 strip    USE TO TEST  FOUR TIMES DAILY    Type 2 diabetes, HbA1c goal < 7% (H)       calcium + D 600-200 MG-UNIT Tabs   Generic drug:  calcium carbonate-vitamin D      1 Tablet daily        HumaLOG KWIKpen 100 UNIT/ML injection   Generic drug:  insulin lispro     30 mL    ADMINISTER 10 UNITS UNDER THE SKIN THREE TIMES DAILY BEFORE MEALS    Type 2 diabetes mellitus without complication, with long-term current use of insulin (H)       insulin pen needle 31G X 5 MM    B-D U/F    300 each    USE 4 DAILY WITH NOVOLOG AND LANTUS PENS AS DIRECTED    Type 2 diabetes mellitus without complication, with long-term current use of insulin (H)       lisinopril 10 MG tablet    PRINIVIL/ZESTRIL    30 tablet    Take 1 tablet (10 mg) by mouth daily    Type 2 diabetes mellitus without complication, with long-term current use of insulin (H)       * order for DME     1 each    Wheelchair seat cushion    Multiple sclerosis (H), Osteosarcoma (H)       * order for DME     1 Units    Crutch, forearm    Chondrosarcoma (H)       simvastatin 80 MG tablet    ZOCOR    45 tablet    TAKE ONE-HALF TABLET BY MOUTH DAILY    CARDIOVASCULAR SCREENING; LDL GOAL LESS THAN 100       vitamin D3 2000 units Caps      1 CAPSULE DAILY        * Notice:  This list has 2 medication(s) that are the same as other medications prescribed for you. Read the directions carefully, and ask your doctor or other care provider to review them with you.

## 2018-05-14 ENCOUNTER — OFFICE VISIT (OUTPATIENT)
Dept: NEUROLOGY | Facility: CLINIC | Age: 58
End: 2018-05-14
Payer: COMMERCIAL

## 2018-05-14 VITALS
OXYGEN SATURATION: 98 % | WEIGHT: 178 LBS | HEART RATE: 75 BPM | DIASTOLIC BLOOD PRESSURE: 66 MMHG | BODY MASS INDEX: 22.85 KG/M2 | SYSTOLIC BLOOD PRESSURE: 116 MMHG

## 2018-05-14 DIAGNOSIS — R68.2 DRY MOUTH: Primary | ICD-10-CM

## 2018-05-14 PROCEDURE — 99214 OFFICE O/P EST MOD 30 MIN: CPT | Performed by: PSYCHIATRY & NEUROLOGY

## 2018-05-14 NOTE — MR AVS SNAPSHOT
After Visit Summary   5/14/2018    Aden Vaz    MRN: [id number]           Patient Information     Date Of Birth          1960        Visit Information        Provider Department      5/14/2018 4:00 PM Josemanuel Zapata MD [facility]        Today's Diagnoses     Dry mouth    -  1      Care Instructions    Will follow up in 1 year    Will check blood work    Please call with question    You saw a neurology provider, Josemanuel Zapata, today at the [facility].  You may have also met with the MS RN Care Coordinator.  In order to get a message to your MS Center provider, you should contact [phone]. You should contact us via this protocol if you have any of the following symptoms:    New or worsening neurologic symptoms that persist for 24-48 hours, such as:  o New onset of pain or marked worsening of pain  o Difficulty with speech, swallowing, or breathing  o New onset of vertigo or dizziness  o Change in bowel or bladder function (incontinence, difficulty urinating)    Increasing difficulty in self care    Marked changes in vision (double vision, blurred vision, graying of vision)    Change in mobility    Change in cognitive function    Falling    Worsening numbness, tingling or pain with a change in function    Worsening fatigue lasting more than 2 weeks  If you had labs completed today, we will contact you with the results.  If you are active in [employer] [facility], they will be released to you there.  Otherwise, your results will be provided to you via mail or telephone call.  Some results take up to 2 weeks for completion.  If you haven t heard anything about your lab results within 2 weeks, you can call or send a [employer] [facility] message to obtain your results.  If you have an MRI scheduled in the week or two prior to your next appointment, we will go over the results at your scheduled follow up appointment.  If you are not scheduled to see your  MS Center provider within about 2 weeks after your MRI, please call or send a [employer] message to obtain your results if you haven t heard anything within 2 weeks.  Please be aware that it takes at least 5 business days after routine MRIs for your results to be reviewed by both the radiologist and your doctor.  MRIs completed at facilities outside of the [place] system take about 2 weeks in order for the MRI disc to be mailed to our clinic and uploaded into your medical record.    Please contact your pharmacy to request refills of your medications.   Please do your best to come to your appointments, and to arrive 15 minutes early to allow time for checking in.  [facility] reserves the right to terminate care of established patients if a patient misses three or more appointments in a clinic without providing notification within a 12-month period.    Developing Your Care Team  Individuals living with chronic illnesses like MS may be unaware that they are at risk for the same range of medical problems as everyone else.  This is why you must establish a relationship with other health care providers in addition to your Multiple Sclerosis doctor.  It can be difficult at times to figure out whether a health concern is related to your MS, or whether it is related to something else, such as hormonal changes, pseduoexacerbations, changes in your core body temperature, flu-like reactions to interferons, exercise, or infections.  Urinary tract infections (UTIs) are common culprits that can cause fatigue, weakness, or other  MS attack -like symptoms without classic symptoms of a UTI.  For this reason, if you call or come in to discuss symptoms, you may be asked to get in touch with your primary provider or another specialist, so that you receive the comprehensive care you need.  What is Multiple Sclerosis (MS)?  MS is a disease in which the nerve tissues in the brain and/or spinal cord are attacked by  immune cells in the body.  These immune cells are present in everyone, and their normal role is to fight off infections.  In people with MS, these cells change the way they function and cross into the nervous system.  Once there, they cause inflammation that damages the myelin (or the protective coating of a nerve cell, much like the plastic covering on an electrical cord) and parts of the nerve cell itself.  So far, a clear cause for this immune system dysfunction has not been found.  MS often starts out as the  relapsing-remitting  form.  This means there are episodes when you have symptoms, and other times when you recover to normal or near-normal.  Over time, if the damage to the nervous system continues, the disease can cause additional disability, such as difficulty walking.  If the relapses and nerve damage can be prevented with available medications, many patients with MS can go many years between relapses and have relatively little disability.  Remember: MS is a condition that changes and must be evaluated on an ongoing basis!  What is a Relapse? (Also called flare-ups, attacks, or exacerbations)  Relapses are due to the occurrence of inflammation in some part of the brain and/or spinal cord.  A relapse is new or recurrent symptoms which persist for at least 24 hours and sometimes worsen over 48 hours.  New symptoms need to be  by at least 1 month in order to be considered separate relapses. Most of the time, symptoms reach their maximal intensity within 2 weeks and then begin to slowly resolve.  At times, your symptoms may not recover fully for up to 6 months, depending on the severity of the episode.  The frequency of relapses is generally higher early in the disease, but can vary greatly among individuals with MS.  Improvement of symptoms for an individual is unpredictable with each relapse.    It is important to remember that an increase in symptoms and changes in function may not necessarily  be a relapse.  There are other factors that contribute to such changes, such as hot weather, increased body temperature, infection/illness, stress and sleep deprivation.  The worsening of symptoms may feel like a relapse when in reality it is not.  These episodes are referred to as pseudorelapses.  Once the underlying cause is addressed, symptoms usually fade away and you feel better.  If you experience a worsening of symptoms that lasts more than 48 hours and does not improve with cooling down, decreasing stress, or treatment of an infection, please call us and we can help to better determine whether you are having a pseudorelapse versus a relapse.                Follow-ups after your visit        Follow-up notes from your care team     Return in about 1 year (around 5/14/2019).      Future tests that were ordered for you today     Open Future Orders        Priority Expected Expires Ordered    [facility] Antibody IgG Routine 8/14/2018 5/14/2019 5/14/2018    [facility] Antibody IgG Routine 8/14/2018 5/14/2019 5/14/2018            Who to contact     If you have questions or need follow up information about today's clinic visit or your schedule please contact [facility] directly at [phone].  Normal or non-critical lab and imaging results will be communicated to you by MyChart, letter or phone within 4 business days after the clinic has received the results. If you do not hear from us within 7 days, please contact the clinic through [employer] or phone. If you have a critical or abnormal lab result, we will notify you by phone as soon as possible.  Submit refill requests through [employer] or call your pharmacy and they will forward the refill request to us. Please allow 3 business days for your refill to be completed.          Additional Information About Your Visit        [employer] Information     [employer] gives you secure access to your electronic health record. If you see a primary care provider, you  can also send messages to your care team and make appointments. If you have questions, please call your primary care clinic.  If you do not have a primary care provider, please call [phone] and they will assist you.      [employer] is an electronic gateway that provides easy, online access to your medical records. With [employer], you can request a clinic appointment, read your test results, renew a prescription or communicate with your care team.     To access your existing account, please contact your [facility] or call [phone] for assistance.        Care EveryWhere ID     This is your Care EveryWhere ID. This could be used by other organizations to access your [facility] medical records  [id number]        Your Vitals Were     Pulse Pulse Oximetry BMI (Body Mass Index)             75 98% 22.85 kg/m2          Blood Pressure from Last 3 Encounters:   05/14/18 116/66   04/24/18 108/68   10/11/17 110/70    Weight from Last 3 Encounters:   05/14/18 80.7 kg (178 lb)   04/24/18 80.1 kg (176 lb 8 oz)   10/11/17 85.3 kg (188 lb)               Primary Care Provider Office Phone # Fax #    Melita S MD [place] [phone] [address] [facility] [address]        Equal Access to Services     [facility]: Hadii aad ku hadasho Soomaali, waaxda luqadaha, qaybta kaalmada adeegyada, waxay idiin hayaan daryl kharanaeem rodriguez. So [facility] [phone].    ATENCIÓN: Si habla español, tiene a da silva disposición servicios gratuitos de asistencia lingüística. Llame al [phone].    We comply with applicable federal civil rights laws and [place] laws. We do not discriminate on the basis of race, color, national origin, age, disability, sex, sexual orientation, or gender identity.            Thank you!     Thank you for choosing [facility]  for your care. Our goal is always to provide you with excellent care. Hearing back from our patients is one way we can  continue to improve our services. Please take a few minutes to complete the written survey that you may receive in the mail after your visit with us. Thank you!             Your Updated Medication List - Protect others around you: Learn how to safely use, store and throw away your medicines at www.disposemymeds.org.          This list is accurate as of 5/14/18  4:33 PM.  Always use your most recent med list.                   Brand Name Dispense Instructions for use Diagnosis    aspirin 81 MG tablet      Take  by mouth daily.        BASAGLAR 100 UNIT/ML injection     15 mL    Inject 10 Units Subcutaneous daily    Type 2 diabetes mellitus without complication, with long-term current use of insulin (H)       DARIN CONTOUR test strip   Generic drug:  blood glucose monitoring     400 strip    USE TO TEST FOUR TIMES DAILY    Type 2 diabetes, HbA1c goal < 7% (H)       calcium + D 600-200 MG-UNIT Tabs   Generic drug:  calcium carbonate-vitamin D      1 Tablet daily        HumaLOG KWIKpen 100 UNIT/ML injection   Generic drug:  insulin lispro     30 mL    ADMINISTER 10 UNITS UNDER THE SKIN THREE TIMES DAILY BEFORE MEALS    Type 2 diabetes mellitus without complication, with long-term current use of insulin (H)       insulin pen needle 31G X 5 MM    B-D U/F    300 each    USE 4 DAILY WITH NOVOLOG AND LANTUS PENS AS DIRECTED    Type 2 diabetes mellitus without complication, with long-term current use of insulin (H)       lisinopril 10 MG tablet    PRINIVIL/ZESTRIL    30 tablet    Take 1 tablet (10 mg) by mouth daily    Type 2 diabetes mellitus without complication, with long-term current use of insulin (H)       * order for DME     1 each    Wheelchair seat cushion    Multiple sclerosis (H), Osteosarcoma (H)       * order for DME     1 Units    Crutch, forearm    Chondrosarcoma (H)       simvastatin 80 MG tablet    ZOCOR    45 tablet    TAKE ONE-HALF TABLET BY MOUTH DAILY    CARDIOVASCULAR SCREENING; LDL GOAL LESS THAN 100        vitamin D3 2000 units Caps      1 CAPSULE DAILY        * Notice:  This list has 2 medication(s) that are the same as other medications prescribed for you. Read the directions carefully, and ask your doctor or other care provider to review them with you.

## 2018-05-14 NOTE — PATIENT INSTRUCTIONS
Will follow up in 1 year    Will check blood work    Please call with question    You saw a neurology provider, Josemanuel Zaapta, today at the [facility].  You may have also met with the MS RN Care Coordinator.  In order to get a message to your MS Center provider, you should contact [phone]. You should contact us via this protocol if you have any of the following symptoms:    New or worsening neurologic symptoms that persist for 24-48 hours, such as:  o New onset of pain or marked worsening of pain  o Difficulty with speech, swallowing, or breathing  o New onset of vertigo or dizziness  o Change in bowel or bladder function (incontinence, difficulty urinating)    Increasing difficulty in self care    Marked changes in vision (double vision, blurred vision, graying of vision)    Change in mobility    Change in cognitive function    Falling    Worsening numbness, tingling or pain with a change in function    Worsening fatigue lasting more than 2 weeks  If you had labs completed today, we will contact you with the results.  If you are active in [employer], they will be released to you there.  Otherwise, your results will be provided to you via mail or telephone call.  Some results take up to 2 weeks for completion.  If you haven t heard anything about your lab results within 2 weeks, you can call or send a [employer] message to obtain your results.  If you have an MRI scheduled in the week or two prior to your next appointment, we will go over the results at your scheduled follow up appointment.  If you are not scheduled to see your MS Center provider within about 2 weeks after your MRI, please call or send a [employer] message to obtain your results if you haven t heard anything within 2 weeks.  Please be aware that it takes at least 5 business days after routine MRIs for your results to be reviewed by both the radiologist and your doctor.  MRIs completed at facilities outside of the  [facility] system take about 2 weeks in order for the MRI disc to be mailed to our clinic and uploaded into your medical record.    Please contact your pharmacy to request refills of your medications.   Please do your best to come to your appointments, and to arrive 15 minutes early to allow time for checking in.  [facility] reserves the right to terminate care of established patients if a patient misses three or more appointments in a clinic without providing notification within a 12-month period.    Developing Your Care Team  Individuals living with chronic illnesses like MS may be unaware that they are at risk for the same range of medical problems as everyone else.  This is why you must establish a relationship with other health care providers in addition to your Multiple Sclerosis doctor.  It can be difficult at times to figure out whether a health concern is related to your MS, or whether it is related to something else, such as hormonal changes, pseduoexacerbations, changes in your core body temperature, flu-like reactions to interferons, exercise, or infections.  Urinary tract infections (UTIs) are common culprits that can cause fatigue, weakness, or other  MS attack -like symptoms without classic symptoms of a UTI.  For this reason, if you call or come in to discuss symptoms, you may be asked to get in touch with your primary provider or another specialist, so that you receive the comprehensive care you need.  What is Multiple Sclerosis (MS)?  MS is a disease in which the nerve tissues in the brain and/or spinal cord are attacked by immune cells in the body.  These immune cells are present in everyone, and their normal role is to fight off infections.  In people with MS, these cells change the way they function and cross into the nervous system.  Once there, they cause inflammation that damages the myelin (or the protective coating of a nerve cell, much like the plastic covering on an  electrical cord) and parts of the nerve cell itself.  So far, a clear cause for this immune system dysfunction has not been found.  MS often starts out as the  relapsing-remitting  form.  This means there are episodes when you have symptoms, and other times when you recover to normal or near-normal.  Over time, if the damage to the nervous system continues, the disease can cause additional disability, such as difficulty walking.  If the relapses and nerve damage can be prevented with available medications, many patients with MS can go many years between relapses and have relatively little disability.  Remember: MS is a condition that changes and must be evaluated on an ongoing basis!  What is a Relapse? (Also called flare-ups, attacks, or exacerbations)  Relapses are due to the occurrence of inflammation in some part of the brain and/or spinal cord.  A relapse is new or recurrent symptoms which persist for at least 24 hours and sometimes worsen over 48 hours.  New symptoms need to be  by at least 1 month in order to be considered separate relapses. Most of the time, symptoms reach their maximal intensity within 2 weeks and then begin to slowly resolve.  At times, your symptoms may not recover fully for up to 6 months, depending on the severity of the episode.  The frequency of relapses is generally higher early in the disease, but can vary greatly among individuals with MS.  Improvement of symptoms for an individual is unpredictable with each relapse.    It is important to remember that an increase in symptoms and changes in function may not necessarily be a relapse.  There are other factors that contribute to such changes, such as hot weather, increased body temperature, infection/illness, stress and sleep deprivation.  The worsening of symptoms may feel like a relapse when in reality it is not.  These episodes are referred to as pseudorelapses.  Once the underlying cause is addressed, symptoms usually  fade away and you feel better.  If you experience a worsening of symptoms that lasts more than 48 hours and does not improve with cooling down, decreasing stress, or treatment of an infection, please call us and we can help to better determine whether you are having a pseudorelapse versus a relapse.

## 2018-05-14 NOTE — NURSING NOTE
Aden Vaz's goals for this visit include: consult  He requests these members of his care team be copied on today's visit information:     PCP: Melita Painter    Referring Provider:  No referring provider defined for this encounter.      Do you need any medication refills at today's visit?

## 2018-05-14 NOTE — LETTER
"    5/14/2018         RE: Aden Vaz  [address]   [place] [address]        Dear Colleague,    Thank you for referring your patient, Aden Vaz, to the [facility]. Please see a copy of my visit note below.    MULTIPLE SCLEROSIS CLINIC AT THE [facility]  FOLLOWUP/ESTABLISHED PATIENT VISIT      PRINCIPAL NEUROLOGIC DIAGNOSIS: Multiple Sclerosis     Date of Onset: 1998  Date of Diagnosis: 1998  Initial Clinical Course: Relapsing Remitting  Current Clinical Course: Relapsing Remitting  Past Disease Modifying Therapy(ies): Copaxone  Current Disease Modifying Therapy(ies):NA  Most Recent MRI of the Brain: 3/27/12  Most Recent MRI of the Cervical Cord NA  Most Recent MRI of the Thoracic Cord: NA  Most Recent Lumbar Puncture: NA  Most Recent OCT: NA   Most Recent JCV:  NA  Most Recent Remote Hepatitis Panel:  NA  Most Recent VZV IgG:  NA  Most Recent TB Quant:  NA        CHIEF COMPLAINT: Follow up off DMT         In 1998, Mr. Vaz noticed weakness and muscle tremors on his left side. One week later he woke up \"paralyzed\" on the left side with numbness and tingling of the left side. The paralysis resolved in about 1 week, but the numbness and tingling have persisted to this day. 6 months after the onset of these symptoms, he lost his sight in the right eye, followed by his left eye. The vision in his right eye returned to normal, but the left eye still has diminished peripheral vision. He was diagnosed with relapsing-remitting MS and began  Treatment with copaxone, which he stopped taking in 2003 due to insurance reasons.   INTERVAL HISTORY:    He had one episodes in October of 2016, when he developed difficulties with his hands and numbness in his leftside. These symptoms lasted for about a couple of months.  It was after the prevenar shot.       He report that he has had increase issues with balance. The patient is still having problems with gait because of his " pelvic surgery. The patient also endorees fatigue. All of these symptoms are largerly stable. They are worsened by stress.       Issues with current MS therapy: Not on DMT    REVIEW OF SYSTEMS:    Mood: unchanged and depressed generally it is very good. He also can be very irritable. IT can provoked by dropping things. He notices that this will get worse if he has low blood sugar.  Spasticity:rarely, he does home physical therapy for range of motion  Bladder: He has difficulty emptying his bladder, He also has urgency and frequency. HE has trained himself to hold his bladder and this is better.  Bowel: Pretty good,   Pain related to today's visit:reviewed on nursing intake documentation  Fatigue: unchanged, Worse with exertion  Sleep: well/ no problem  Memory/Concentration: Good, he has occasional MS fog and difficulty with word finding difficulties.     PAST HISTORY was reviewed and updated:      MEDICATIONS and ALLERGIES were reviewed and updated.    SOCIAL HISTORY was reviewed and updated:        EXAM:    PHYSICAL EXAMINATION:   VITAL SIGNS:  B/P: 116/66, T: Data Unavailable, P: 75, R: Data Unavailable    GENERAL: The patient is a well-nourished  who presents to the evaluation alone.  NEUROLOGIC:   MENTAL STATUS: Alert,awake and  oriented times four.   CRANIAL NERVES:  Visual fields are full to confrontation. The pupils are  round and react to light and there is no Star Gladys pupil.  Extraocular movements are  intact with no  internuclear ophthalmoplegia. No nystagmus. Facial strength and sensation are  normal. Hearing is  normal. Palate elevation and tongue protrusion are  normal.   POWER:     Motor    Upper      Right Left   Shoulder Abduction 5 5   Elbow Flexion 5 5   Elbow Extension 5 5   Wrist Extension 5 5   Digit Extension 5 5   Digit Flexion 5 5   APB 5 5   Tone 0 0   Lower       Right Left   Hip Flexion 4.5 3.5   Knee Extension 5 5   Knee Flexion 5 5   Foot Dorsiflexion 4 4   Foot Plantar Flexion 4 4    EH 4 4   Toe Flexion 4 4   Tone 1 1           Grade Description   0 No increase in muscle tone   1 Slight increase in muscle tone, manifested by a catch and release or by minimal resistance at the end of the range of motion when the affected part(s) is moved in flexion or extension   1+ Slight increase in muscle tone, manifested by a catch, followed by minimal resistance throughout the remainder (less than half) of the ROM   2 More marked increase in muscle tone through most of the ROM, but affected part(s) easily moved   3 Considerable increase in muscle tone, passive movement difficult   4 Affected part(s) rigid in flexion or extension           SENSORY:     Light touch:  Intact in all extremities        MOTOR/CEREBELLAR:    Right Left   RRM 1 Abnormal 1 Abnormal   ADRIEL 1 Abnormal 1 Abnormal   FTN 1 Abnormal 1 Abnormal   RRM 1 Abnormal 1 Abnormal   HKS 1 Abnormal 1 Abnormal           GAIT:  Has had pelvic surgery with loose anterior screws. Patient can not stand without bilateral support    RESULTS:  Monitoring labs:    Office Visit on 04/24/2018   Component Date Value Ref Range Status     Sodium 04/24/2018 136  133 - 144 mmol/L Final     Potassium 04/24/2018 4.1  3.4 - 5.3 mmol/L Final     Chloride 04/24/2018 102  94 - 109 mmol/L Final     Carbon Dioxide 04/24/2018 28  20 - 32 mmol/L Final     Anion Gap 04/24/2018 6  3 - 14 mmol/L Final     Glucose 04/24/2018 145* 70 - 99 mg/dL Final     Urea Nitrogen 04/24/2018 15  7 - 30 mg/dL Final     Creatinine 04/24/2018 0.95  0.66 - 1.25 mg/dL Final     GFR Estimate 04/24/2018 81  >60 mL/min/1.7m2 Final     GFR Estimate If Black 04/24/2018 >90  >60 mL/min/1.7m2 Final     Calcium 04/24/2018 9.3  8.5 - 10.1 mg/dL Final     Bilirubin Total 04/24/2018 0.3  0.2 - 1.3 mg/dL Final     Albumin 04/24/2018 3.7  3.4 - 5.0 g/dL Final     Protein Total 04/24/2018 7.5  6.8 - 8.8 g/dL Final     Alkaline Phosphatase 04/24/2018 87  40 - 150 U/L Final     ALT 04/24/2018 44  0 - 70 U/L  Final     AST 04/24/2018 32  0 - 45 U/L Final     Hemoglobin A1C 04/24/2018 6.6* 0 - 5.6 % Final     Creatinine Urine 04/24/2018 30  mg/dL Final     Albumin Urine mg/L 04/24/2018 <5  mg/L Final     Albumin Urine mg/g Cr 04/24/2018 Unable to calculate due to low value  0 - 17 mg/g Cr Final   Orders Only on 01/26/2018   Component Date Value Ref Range Status     Occult Blood Scn FIT 01/25/2018 Negative  NEG^Negative Final   ]      MRI brain:    no new MRI to review    ASSESSMENT/PLAN:  the patient is a 57-year-old man with a past medical history of relapsing remitting multiple sclerosis who is presenting today as a follow. Overall, the patient appears to be clinically stable since last being seen. It's difficult to determine exactly what disability is related to his shattered pelvis versus multiple sclerosis. I offered to get MRI to determine where he is from the disease standpoint. However, the patient does not think that he'll go back on therapy regardless of what it shows. Therefore, I will defer getting this test at this time because it will not affect management. I reviewed alternative treatment methods like Tavist and Biotin with the patient, but he was not interested in this. The patient just wants to continue working on his physical therapy and he feels that he will be good. Tentatively, I will follow the patient up in one year. I advise the patient to call me with any issues or problems.        I spent 25 minutes in this visit, with >50% direct patient time spent counseling about prognosis, treatment options, and coordination of care.    Josemanuel Zapata MD [facility]  Staff Neurologist   05/14/18       (Chart documentation was completed in part with Dragon voice-recognition software. Even though reviewed, some grammatical, spelling, and word errors may remain.)         Again, thank you for allowing me to participate in the care of your patient.        Sincerely,        Josemanuel Zapata MD

## 2018-05-14 NOTE — PROGRESS NOTES
"MULTIPLE SCLEROSIS CLINIC AT THE [facility]  FOLLOWUP/ESTABLISHED PATIENT VISIT      PRINCIPAL NEUROLOGIC DIAGNOSIS: Multiple Sclerosis    Date of Onset: 1998  Date of Diagnosis: 1998  Initial Clinical Course: Relapsing Remitting  Current Clinical Course: Relapsing Remitting  Past Disease Modifying Therapy(ies): Copaxone  Current Disease Modifying Therapy(ies):NA  Most Recent MRI of the Brain: 3/27/12  Most Recent MRI of the Cervical Cord NA  Most Recent MRI of the Thoracic Cord: NA  Most Recent Lumbar Puncture: NA  Most Recent OCT: NA   Most Recent JCV:  NA  Most Recent Remote Hepatitis Panel:  NA  Most Recent VZV IgG:  NA  Most Recent TB Quant:  NA      CHIEF COMPLAINT: Follow up off DMT       In 1998, Mr. Vaz noticed weakness and muscle tremors on his left side. One week later he woke up \"paralyzed\" on the left side with numbness and tingling of the left side. The paralysis resolved in about 1 week, but the numbness and tingling have persisted to this day. 6 months after the onset of these symptoms, he lost his sight in the right eye, followed by his left eye. The vision in his right eye returned to normal, but the left eye still has diminished peripheral vision. He was diagnosed with relapsing-remitting MS and began  Treatment with copaxone, which he stopped taking in 2003 due to insurance reasons.  He has had, as best I can tell, no further relapses since the optic neuritis.    HPI  In 1998, Mr. Vaz noticed weakness and muscle tremors on his left side. One week later he woke up \"paralyzed\" on the left side with numbness and tingling of the left side. The paralysis resolved in about 1 week, but the numbness and tingling have persisted to this day. 6 months after the onset of these symptoms, he lost his sight in the right eye, followed by his left eye. The vision in his right eye returned to normal, but the left eye still has diminished peripheral vision. He was diagnosed with " relapsing-remitting MS and began  Treatment with copaxone, which he stopped taking in 2003 due to insurance reasons.  He has had, as best I can tell, no further relapses since the optic neuritis.

## 2018-05-14 NOTE — PROGRESS NOTES
"MULTIPLE SCLEROSIS CLINIC AT THE [facility]  FOLLOWUP/ESTABLISHED PATIENT VISIT      PRINCIPAL NEUROLOGIC DIAGNOSIS: Multiple Sclerosis     Date of Onset: 1998  Date of Diagnosis: 1998  Initial Clinical Course: Relapsing Remitting  Current Clinical Course: Relapsing Remitting  Past Disease Modifying Therapy(ies): Copaxone  Current Disease Modifying Therapy(ies):NA  Most Recent MRI of the Brain: 3/27/12  Most Recent MRI of the Cervical Cord NA  Most Recent MRI of the Thoracic Cord: NA  Most Recent Lumbar Puncture: NA  Most Recent OCT: NA   Most Recent JCV:  NA  Most Recent Remote Hepatitis Panel:  NA  Most Recent VZV IgG:  NA  Most Recent TB Quant:  NA        CHIEF COMPLAINT: Follow up off DMT         In 1998, Mr. Vaz noticed weakness and muscle tremors on his left side. One week later he woke up \"paralyzed\" on the left side with numbness and tingling of the left side. The paralysis resolved in about 1 week, but the numbness and tingling have persisted to this day. 6 months after the onset of these symptoms, he lost his sight in the right eye, followed by his left eye. The vision in his right eye returned to normal, but the left eye still has diminished peripheral vision. He was diagnosed with relapsing-remitting MS and began  Treatment with copaxone, which he stopped taking in 2003 due to insurance reasons.   INTERVAL HISTORY:    He had one episodes in October of 2016, when he developed difficulties with his hands and numbness in his leftside. These symptoms lasted for about a couple of months.  It was after the prevenar shot.       He report that he has had increase issues with balance. The patient is still having problems with gait because of his pelvic surgery. The patient also endorees fatigue. All of these symptoms are largerly stable. They are worsened by stress.       Issues with current MS therapy: Not on DMT    REVIEW OF SYSTEMS:    Mood: unchanged and depressed generally it is very good. He " also can be very irritable. IT can provoked by dropping things. He notices that this will get worse if he has low blood sugar.  Spasticity:rarely, he does home physical therapy for range of motion  Bladder: He has difficulty emptying his bladder, He also has urgency and frequency. HE has trained himself to hold his bladder and this is better.  Bowel: Pretty good,   Pain related to today's visit:reviewed on nursing intake documentation  Fatigue: unchanged, Worse with exertion  Sleep: well/ no problem  Memory/Concentration: Good, he has occasional MS fog and difficulty with word finding difficulties.     PAST HISTORY was reviewed and updated:      MEDICATIONS and ALLERGIES were reviewed and updated.    SOCIAL HISTORY was reviewed and updated:        EXAM:    PHYSICAL EXAMINATION:   VITAL SIGNS:  B/P: 116/66, T: Data Unavailable, P: 75, R: Data Unavailable    GENERAL: The patient is a well-nourished  who presents to the evaluation alone.  NEUROLOGIC:   MENTAL STATUS: Alert,awake and  oriented times four.   CRANIAL NERVES:  Visual fields are full to confrontation. The pupils are  round and react to light and there is no Star Gladys pupil.  Extraocular movements are  intact with no  internuclear ophthalmoplegia. No nystagmus. Facial strength and sensation are  normal. Hearing is  normal. Palate elevation and tongue protrusion are  normal.   POWER:     Motor    Upper      Right Left   Shoulder Abduction 5 5   Elbow Flexion 5 5   Elbow Extension 5 5   Wrist Extension 5 5   Digit Extension 5 5   Digit Flexion 5 5   APB 5 5   Tone 0 0   Lower       Right Left   Hip Flexion 4.5 3.5   Knee Extension 5 5   Knee Flexion 5 5   Foot Dorsiflexion 4 4   Foot Plantar Flexion 4 4   EH 4 4   Toe Flexion 4 4   Tone 1 1           Grade Description   0 No increase in muscle tone   1 Slight increase in muscle tone, manifested by a catch and release or by minimal resistance at the end of the range of motion when the affected part(s) is  moved in flexion or extension   1+ Slight increase in muscle tone, manifested by a catch, followed by minimal resistance throughout the remainder (less than half) of the ROM   2 More marked increase in muscle tone through most of the ROM, but affected part(s) easily moved   3 Considerable increase in muscle tone, passive movement difficult   4 Affected part(s) rigid in flexion or extension           SENSORY:     Light touch:  Intact in all extremities        MOTOR/CEREBELLAR:    Right Left   RRM 1 Abnormal 1 Abnormal   ADRIEL 1 Abnormal 1 Abnormal   FTN 1 Abnormal 1 Abnormal   RRM 1 Abnormal 1 Abnormal   HKS 1 Abnormal 1 Abnormal           GAIT:  Has had pelvic surgery with loose anterior screws. Patient can not stand without bilateral support    RESULTS:  Monitoring labs:    Office Visit on 04/24/2018   Component Date Value Ref Range Status     Sodium 04/24/2018 136  133 - 144 mmol/L Final     Potassium 04/24/2018 4.1  3.4 - 5.3 mmol/L Final     Chloride 04/24/2018 102  94 - 109 mmol/L Final     Carbon Dioxide 04/24/2018 28  20 - 32 mmol/L Final     Anion Gap 04/24/2018 6  3 - 14 mmol/L Final     Glucose 04/24/2018 145* 70 - 99 mg/dL Final     Urea Nitrogen 04/24/2018 15  7 - 30 mg/dL Final     Creatinine 04/24/2018 0.95  0.66 - 1.25 mg/dL Final     GFR Estimate 04/24/2018 81  >60 mL/min/1.7m2 Final     GFR Estimate If Black 04/24/2018 >90  >60 mL/min/1.7m2 Final     Calcium 04/24/2018 9.3  8.5 - 10.1 mg/dL Final     Bilirubin Total 04/24/2018 0.3  0.2 - 1.3 mg/dL Final     Albumin 04/24/2018 3.7  3.4 - 5.0 g/dL Final     Protein Total 04/24/2018 7.5  6.8 - 8.8 g/dL Final     Alkaline Phosphatase 04/24/2018 87  40 - 150 U/L Final     ALT 04/24/2018 44  0 - 70 U/L Final     AST 04/24/2018 32  0 - 45 U/L Final     Hemoglobin A1C 04/24/2018 6.6* 0 - 5.6 % Final     Creatinine Urine 04/24/2018 30  mg/dL Final     Albumin Urine mg/L 04/24/2018 <5  mg/L Final     Albumin Urine mg/g Cr 04/24/2018 Unable to calculate due to  low value  0 - 17 mg/g Cr Final   Orders Only on 01/26/2018   Component Date Value Ref Range Status     Occult Blood Scn FIT 01/25/2018 Negative  NEG^Negative Final   ]      MRI brain:    no new MRI to review    ASSESSMENT/PLAN:  the patient is a 57-year-old man with a past medical history of relapsing remitting multiple sclerosis who is presenting today as a follow. Overall, the patient appears to be clinically stable since last being seen. It's difficult to determine exactly what disability is related to his shattered pelvis versus multiple sclerosis. I offered to get MRI to determine where he is from the disease standpoint. However, the patient does not think that he'll go back on therapy regardless of what it shows. Therefore, I will defer getting this test at this time because it will not affect management. I reviewed alternative treatment methods like Tavist and Biotin with the patient, but he was not interested in this. The patient just wants to continue working on his physical therapy and he feels that he will be good. Tentatively, I will follow the patient up in one year. I advise the patient to call me with any issues or problems.        I spent 25 minutes in this visit, with >50% direct patient time spent counseling about prognosis, treatment options, and coordination of care.    Josemanuel Zapata MD [facility]  Staff Neurologist   05/14/18       (Chart documentation was completed in part with Dragon voice-recognition software. Even though reviewed, some grammatical, spelling, and word errors may remain.)

## 2018-07-23 DIAGNOSIS — E11.9 TYPE 2 DIABETES MELLITUS WITHOUT COMPLICATION, WITH LONG-TERM CURRENT USE OF INSULIN (H): ICD-10-CM

## 2018-07-23 DIAGNOSIS — Z79.4 TYPE 2 DIABETES MELLITUS WITHOUT COMPLICATION, WITH LONG-TERM CURRENT USE OF INSULIN (H): ICD-10-CM

## 2018-07-24 NOTE — TELEPHONE ENCOUNTER
Humalog  Routing refill request to provider for review/approval because:  Appears to be a break in medication    Katina Ladd, RN, BSN

## 2018-08-02 DIAGNOSIS — Z96.649 H/O TOTAL HIP ARTHROPLASTY: Primary | ICD-10-CM

## 2018-08-02 RX ORDER — AMOXICILLIN 500 MG/1
CAPSULE ORAL
Qty: 4 CAPSULE | Refills: 4 | Status: SHIPPED | OUTPATIENT
Start: 2018-08-02 | End: 2018-10-12

## 2018-08-13 DIAGNOSIS — Z13.6 CARDIOVASCULAR SCREENING; LDL GOAL LESS THAN 100: ICD-10-CM

## 2018-08-14 RX ORDER — SIMVASTATIN 80 MG
TABLET ORAL
Qty: 45 TABLET | Refills: 0 | Status: SHIPPED | OUTPATIENT
Start: 2018-08-14 | End: 2018-11-07

## 2018-08-14 NOTE — TELEPHONE ENCOUNTER
"Requested Prescriptions   Pending Prescriptions Disp Refills     simvastatin (ZOCOR) 80 MG tablet [Pharmacy Med Name: SIMVASTATIN 80MG TABLETS] 45 tablet 0     Sig: TAKE ONE-HALF TABLET BY MOUTH DAILY    Statins Protocol Passed    8/13/2018  2:32 PM       Passed - LDL on file in past 12 months    Recent Labs   Lab Test  10/11/17   1427   LDL  98          Passed - No abnormal creatine kinase in past 12 months    No lab results found.        Passed - Recent (12 mo) or future (30 days) visit within the authorizing provider's specialty    Patient had office visit in the last 12 months or has a visit in the next 30 days with authorizing provider or within the authorizing provider's specialty.  See \"Patient Info\" tab in inbasket, or \"Choose Columns\" in Meds & Orders section of the refill encounter.           Passed - Patient is age 18 or older        simvastatin (ZOCOR) 80 MG tablet  Prescription approved per [facility] Refill Protocol.    Due for next OV with fasting labs around 10/24/2018  Please assist with scheduling.    Soheila Vaz, RN, BSN       "

## 2018-08-24 ENCOUNTER — TELEPHONE (OUTPATIENT)
Dept: FAMILY MEDICINE | Facility: CLINIC | Age: 58
End: 2018-08-24

## 2018-08-24 NOTE — TELEPHONE ENCOUNTER
Reason for call:  Form  Reason for Call:  Form, our goal is to have forms completed with 72 hours, however, some forms may require a visit or additional information.    Type of letter, form or note:  medical    Who is the form from?:  [employer]     Where did the form come from: form was faxed in    What clinic location was the form placed at?: [facility] - [phone]    Where the form was placed: 's Box    What number is listed as a contact on the form?:  [phone]       Additional comments:  Fax to [phone]    created by Dianna Farrellneelam

## 2018-10-05 ENCOUNTER — TELEPHONE (OUTPATIENT)
Dept: FAMILY MEDICINE | Facility: CLINIC | Age: 58
End: 2018-10-05

## 2018-10-05 DIAGNOSIS — Z79.4 TYPE 2 DIABETES MELLITUS WITHOUT COMPLICATION, WITH LONG-TERM CURRENT USE OF INSULIN (H): ICD-10-CM

## 2018-10-05 DIAGNOSIS — E78.5 HYPERLIPIDEMIA LDL GOAL <100: Primary | ICD-10-CM

## 2018-10-05 DIAGNOSIS — Z79.899 ENCOUNTER FOR LONG-TERM (CURRENT) USE OF MEDICATIONS: ICD-10-CM

## 2018-10-05 DIAGNOSIS — E11.9 TYPE 2 DIABETES MELLITUS WITHOUT COMPLICATION, WITH LONG-TERM CURRENT USE OF INSULIN (H): ICD-10-CM

## 2018-10-05 NOTE — TELEPHONE ENCOUNTER
Reason for Call:  Other labs    Detailed comments: pt scheduled for physical on 10/12. Pt wondering if he will be needing fasting labs or not. Please advise and call pt in regards     Phone Number Patient can be reached at: Cell number on file:    Telephone Information:   Mobile [phone]       Best Time: ANY    Can we leave a detailed message on this number? YES    Call taken on 10/5/2018 at 2:04 PM by Thelma Schmidt

## 2018-10-10 NOTE — PATIENT INSTRUCTIONS
You can try using Hydrocortisone on the spot on your forehead to help. If symptoms worsen, contact me.      Preventive Health Recommendations  Male Ages 50 - 64    Yearly exam:             See your health care provider every year in order to  o   Review health changes.   o   Discuss preventive care.    o   Review your medicines if your doctor has prescribed any.     Have a cholesterol test every 5 years, or more frequently if you are at risk for high cholesterol/heart disease.     Have a diabetes test (fasting glucose) every three years. If you are at risk for diabetes, you should have this test more often.     Have a colonoscopy at age 50, or have a yearly FIT test (stool test). These exams will check for colon cancer.      Talk with your health care provider about whether or not a prostate cancer screening test (PSA) is right for you.    You should be tested each year for STDs (sexually transmitted diseases), if you re at risk.     Shots: Get a flu shot each year. Get a tetanus shot every 10 years.     Nutrition:    Eat at least 5 servings of fruits and vegetables daily.     Eat whole-grain bread, whole-wheat pasta and brown rice instead of white grains and rice.     Get adequate Calcium and Vitamin D.     Lifestyle    Exercise for at least 150 minutes a week (30 minutes a day, 5 days a week). This will help you control your weight and prevent disease.     Limit alcohol to one drink per day.     No smoking.     Wear sunscreen to prevent skin cancer.     See your dentist every six months for an exam and cleaning.     See your eye doctor every 1 to 2 years.

## 2018-10-10 NOTE — PROGRESS NOTES
"SUBJECTIVE:   CC: Aden Vaz is an 58 year old male who presents for preventative health visit.     Physical   Annual:     Getting at least 3 servings of Calcium per day:  Yes    Bi-annual eye exam:  Yes    Dental care twice a year:  NO    Sleep apnea or symptoms of sleep apnea:  None    Diet:  Low salt, Low fat/cholesterol, Diabetic and Carbohydrate counting    Frequency of exercise:  6-7 days/week    Duration of exercise:  30-45 minutes    Taking medications regularly:  Yes    Medication side effects:  None    Additional concerns today:  YES    Blood sugar  The patient states that his blood sugars ran high last week. He states that other than that, he is doing well.   No issues with medications.     Mood  He states that overall he is doing okay, he is just taking things day by day, due to stressors in his life. He complains of not having a social life anymore. The patient states that he is not depressed, but may feel down from time to time.     \"Skin peeling\"  The patient states that he has a patch of peeling skin on his left forehead. He states that from time to time, he will have this occur at different places on his body.     Neuro  He also states that his follow ups with Neurology are doing well, in regards to his MS. The also complains of feeling tightness in his sciatic nerve.     The patient also complains of having occasional dry mouth.     The patient states that at times, he feels as if some of the screws in his pelvis will start to poke him. Further surgery has been recommended to him in the past and he does not have interest in doing any more. He notes that the recoveries are very difficult and the surgeon has not noted that he would have much more function than he has currently.     The patient states that on October 9th, he \"wiped out\" and ended up bruising himself on his back. Only mild discomfort at the tailbone area.     Today's PHQ-2 Score:   PHQ-2 ( 1999 [employer]) 10/12/2018   Q1: Little " interest or pleasure in doing things 0   Q2: Feeling down, depressed or hopeless 1   PHQ-2 Score 1   Q1: Little interest or pleasure in doing things -   Q2: Feeling down, depressed or hopeless -   PHQ-2 Score -     Abuse: Current or Past(Physical, Sexual or Emotional)- No  Do you feel safe in your environment - Yes    Social History   Substance Use Topics     Smoking status: Former Smoker     Packs/day: 1.50     Years: 30.00     Types: Cigarettes     Quit date: 12/10/2002     Smokeless tobacco: Never Used     Alcohol use No      Comment: Sober for 25 years     Alcohol Use 10/12/2018   If you drink alcohol do you typically have greater than 3 drinks per day OR greater than 7 drinks per week? Not Applicable   No flowsheet data found.    Last PSA:   PSA   Date Value Ref Range Status   10/11/2017 0.59 0 - 4 ug/L Final     Comment:     Assay Method:  Chemiluminescence using Siemens Vista analyzer       Reviewed orders with patient. 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)     Chondrosarcoma (H)     Advanced directives, counseling/discussion     Type A blood, Rh positive     Esophageal reflux     Raynaud's disease without gangrene     Failed allograft of bone     Pelvis fracture (H)     Type 2 diabetes mellitus without complication, with long-term current use of insulin (H)     Hyperlipidemia LDL goal <100     Mechanical complication of prosthetic hip  implant, subsequent encounter     Past Surgical History:   Procedure Laterality Date     ARTHROPLASTY REVISION HIP Left 9/25/2015    Procedure: ARTHROPLASTY REVISION HIP;  Surgeon: Payam Gage MD;  Location: [facility] OR     BIOPSY  2003     C HAND/FINGER SURGERY UNLISTED       C PELVIS/HIP JOINT SURGERY UNLISTED       COMBINED CYSTOSCOPY, INSERT STENT URETER(S) Left 9/25/2015    Procedure: COMBINED CYSTOSCOPY, INSERT STENT URETER(S);  Surgeon: Jass King MD;  Location: [facility] OR     OPTICAL TRACKING SYSTEM OPEN REDUCTION INTERNAL FIXATION PELVIS Left 9/25/2015    Procedure: OPTICAL TRACKING SYSTEM OPEN REDUCTION INTERNAL FIXATION PELVIS;  Surgeon: Payam Gage MD;  Location: [facility] OR     ORTHOPEDIC SURGERY       PAST SURGICAL HISTORY      several pelvic surgeries for osteosarcoma     RELEASE CARPAL TUNNEL  7/2/2012    Procedure: RELEASE CARPAL TUNNEL;  Right carpal tunnel release;  Surgeon: Mery Link MD;  Location: [facility] OR     SOFT TISSUE SURGERY         Social History   Substance Use Topics     Smoking status: Former Smoker     Packs/day: 1.50     Years: 30.00     Types: Cigarettes     Quit date: 12/10/2002     Smokeless tobacco: Never Used     Alcohol use No      Comment: Sober for 25 years     Family History   Problem Relation Age of Onset     Genitourinary Problems Mother      Liver disease     Breast Cancer Mother      HEART DISEASE Father      Cardiac Sudden Death Father      Cancer Sister      Prostate Cancer Other      Asthma No family hx of      C.A.D. No family hx of      Diabetes No family hx of      Hypertension No family hx of      Cerebrovascular Disease No family hx of      Cancer - colorectal No family hx of      Alcohol/Drug No family hx of      Allergies No family hx of      Alzheimer Disease No family hx of      Anesthesia Reaction No family hx of      Blood Disease No family hx of      Arthritis No family hx of      Cardiovascular No family hx of      Circulatory No family hx of       Congenital Anomalies No family hx of      Connective Tissue Disorder No family hx of      Depression No family hx of      Endocrine Disease No family hx of      Eye Disorder No family hx of      Genetic Disorder No family hx of      GASTROINTESTINAL DISEASE No family hx of      Gynecology No family hx of      Lipids No family hx of      Hearing Loss No family hx of      Family History Negative No family hx of      Respiratory No family hx of      Psychotic Disorder No family hx of      Osteoporosis No family hx of      Obesity No family hx of      Neurologic Disorder No family hx of      Musculoskeletal Disorder No family hx of      Thyroid Disease No family hx of          Current Outpatient Prescriptions   Medication Sig Dispense Refill     aspirin 81 MG tablet Take  by mouth daily.  3     BASAGLAR 100 UNIT/ML injection Inject 10 Units Subcutaneous daily 15 mL 1     DARIN CONTOUR test strip USE TO TEST FOUR TIMES DAILY 400 strip 1     CALCIUM + D 600-200 MG-UNIT PO TABS 1 Tablet daily       insulin lispro (HUMALOG KWIKPEN) 100 UNIT/ML injection ADMINISTER 10 UNITS UNDER THE SKIN THREE TIMES DAILY BEFORE MEALS 30 mL 0     insulin pen needle (B-D U/F) 31G X 5 MM USE 4 DAILY WITH NOVOLOG AND LANTUS PENS AS DIRECTED 300 each 5     lisinopril (PRINIVIL/ZESTRIL) 10 MG tablet Take 1 tablet (10 mg) by mouth daily 30 tablet 5     ORDER FOR DME Crutch, forearm 1 Units 0     simvastatin (ZOCOR) 80 MG tablet TAKE ONE-HALF TABLET BY MOUTH DAILY 45 tablet 0     VITAMIN D3 2000 UNIT PO CAPS 1 CAPSULE DAILY       amoxicillin (AMOXIL) 500 MG capsule Take 4 capsules by mouth one hour before Dental Appointment (Patient not taking: Reported on 10/12/2018) 4 capsule 4     ORDER FOR DME Wheelchair seat cushion (Patient not taking: Reported on 10/12/2018) 1 each 0     Allergies   Allergen Reactions     No Known Drug Allergies      Recent Labs   Lab Test  04/24/18   1351  10/11/17   1427  04/11/17   1624  10/04/16   0955   09/10/15    1650   A1C  6.6*  6.7*  6.9*  6.7*   < >   --    LDL   --   98   --   98   --   107   HDL   --   48   --   49   --   48   TRIG   --   91   --   64   --   78   ALT  44   --   50  36   --   50   CR  0.95   --   0.96  0.98   < >  0.89   GFRESTIMATED  81   --   81  78   < >  89   GFRESTBLACK  >90   --   >90   GFR Calc    >90   GFR Calc     < >  >90   GFR Calc     POTASSIUM  4.1   --   4.3  4.9   < >  4.7   TSH   --    --   1.60  1.72   --    --     < > = values in this interval not displayed.      Reviewed and updated as needed this visit by clinical staff  Tobacco  Allergies  Meds  Med Hx  Surg Hx  Fam Hx  Soc Hx        Reviewed and updated as needed this visit by Provider          Review of Systems  CONSTITUTIONAL: NEGATIVE for fever, chills, change in weight; POSITIVE for dry mouth  INTEGUMENTARY/SKIN: NEGATIVE for worrisome rashes, moles or lesions; POSITIVE for peeling skin patches  EYES: NEGATIVE for vision changes or irritation  ENT: NEGATIVE for ear, mouth and throat problems  RESP: NEGATIVE for significant cough or SOB  CV: NEGATIVE for chest pain, palpitations or peripheral edema  GI: NEGATIVE for nausea, abdominal pain, heartburn, or change in bowel habits   male: negative for dysuria, hematuria, decreased urinary stream, erectile dysfunction, urethral discharge  MUSCULOSKELETAL: NEGATIVE for significant arthralgias or myalgia; POSITIVE for screw poking in pelvis  NEURO: NEGATIVE for weakness, dizziness or paresthesias; POSITIVE for tightness in sciatic nerve  PSYCHIATRIC: NEGATIVE for changes in mood or affect    This document serves as a record of the services and decisions personally performed and made by Melita Painter MD. It was created on her behalf by Ninoska Newby, a trained medical scribe. The creation of this document is based the provider's statements to the medical scribe.    Ninoska Newby October 12, 2018 3:24 PM  OBJECTIVE:   /70  " Pulse 79  Temp 97.5  F (36.4  C) (Oral)  Resp 18  Ht 1.88 m (6' 2\")  Wt 79.9 kg (176 lb 3.2 oz)  BMI 22.62 kg/m2    Physical Exam  GENERAL: healthy, alert and no distress  HENT: ear canals and TM's normal, nose and mouth without ulcers or lesions  NECK: no adenopathy, no asymmetry, masses, or scars and thyroid normal to palpation  RESP: lungs clear to auscultation - no rales, rhonchi or wheezes  CV: regular rate and rhythm, normal S1 S2, no S3 or S4, no murmur, click or rub, no peripheral edema and peripheral pulses strong  ABDOMEN: soft, nontender, no hepatosplenomegaly, no masses and bowel sounds normal  MS: no gross musculoskeletal defects noted, no edema  SKIN: no suspicious lesions or rashes, small erythematous patch above left eyebrow with slight scaling  NEURO: Normal strength and tone, mentation intact and speech normal  PSYCH: mentation appears normal, affect normal/bright    Diagnostic Test Results:  No results found for this or any previous visit (from the past 24 hour(s)).    ASSESSMENT/PLAN:   1. Routine general medical examination at a health care facility  Physical exam was administered today. Flu shot given today.   See counseling messages.    2. Type 2 diabetes mellitus without complication, with long-term current use of insulin (H)  Awaiting results. Dose adjustment as needed.    Labs have been ordered. Will notify of results.  - Hemoglobin A1c  - Comprehensive metabolic panel    3. Chondrosarcoma (H)  Patient does not plan further surgeries at this time for his pelvic issues.  Recommended he let Dr. Gage know of further instability issues but he declines.   He will contact with any further issues.   Discussed PT to help with lower back discomfort that comes occasionally but he declines.     4. Multiple sclerosis (H)  Patient will continue care with Neurology.   Doing well. He has rare concerns.     5. Hyperlipidemia LDL goal <100  Doing well. Well controlled. Tolerating medication.  No " "change in plan.   Labs have been ordered. Will notify of results.   - Lipid panel reflex to direct LDL Fasting    6. Dermatitis  Discussed plan with patient.   Patient will use Hydrocortisone on affected areas.  If symptoms worsen, patient will contact me.      7. Encounter for long-term (current) use of medications  Doing well. Well controlled.   Labs have been ordered. Awaiting results.   - Comprehensive metabolic panel  - CBC with platelets and differential    8. Screening for prostate cancer  Awaiting results. Will notify of results.   - PSA, screen    COUNSELING:   Special attention given to:        Regular exercise       Healthy diet/nutrition    BP Readings from Last 1 Encounters:   10/12/18 110/70     Estimated body mass index is 22.62 kg/(m^2) as calculated from the following:    Height as of this encounter: 1.88 m (6' 2\").    Weight as of this encounter: 79.9 kg (176 lb 3.2 oz).     reports that he quit smoking about 15 years ago. His smoking use included Cigarettes. He has a 45.00 pack-year smoking history. He has never used smokeless tobacco.    Counseling Resources:  ATP IV Guidelines  Pooled Cohorts Equation Calculator  FRAX Risk Assessment  ICSI Preventive Guidelines  Dietary Guidelines for Americans, 2010  USDA's MyPlate  ASA Prophylaxis  Lung CA Screening    The information in this document, created by the medical scribe for me, accurately reflects the services I personally performed and the decisions made by me. I have reviewed and approved this document for accuracy prior to leaving the patient care area.    DONALD CALDERON MD, MD  [facility]  Answers for HPI/ROS submitted by the patient on 10/12/2018   PHQ-2 Score: Incomplete    "

## 2018-10-12 ENCOUNTER — OFFICE VISIT (OUTPATIENT)
Dept: FAMILY MEDICINE | Facility: CLINIC | Age: 58
End: 2018-10-12
Payer: COMMERCIAL

## 2018-10-12 VITALS
SYSTOLIC BLOOD PRESSURE: 110 MMHG | DIASTOLIC BLOOD PRESSURE: 70 MMHG | WEIGHT: 176.2 LBS | HEIGHT: 74 IN | RESPIRATION RATE: 18 BRPM | TEMPERATURE: 97.5 F | HEART RATE: 79 BPM | BODY MASS INDEX: 22.61 KG/M2

## 2018-10-12 DIAGNOSIS — Z12.5 SCREENING FOR PROSTATE CANCER: ICD-10-CM

## 2018-10-12 DIAGNOSIS — E78.5 HYPERLIPIDEMIA LDL GOAL <100: ICD-10-CM

## 2018-10-12 DIAGNOSIS — Z00.00 ROUTINE GENERAL MEDICAL EXAMINATION AT A HEALTH CARE FACILITY: Primary | ICD-10-CM

## 2018-10-12 DIAGNOSIS — E11.9 TYPE 2 DIABETES MELLITUS WITHOUT COMPLICATION, WITH LONG-TERM CURRENT USE OF INSULIN (H): ICD-10-CM

## 2018-10-12 DIAGNOSIS — G35 MULTIPLE SCLEROSIS (H): ICD-10-CM

## 2018-10-12 DIAGNOSIS — Z79.4 TYPE 2 DIABETES MELLITUS WITHOUT COMPLICATION, WITH LONG-TERM CURRENT USE OF INSULIN (H): ICD-10-CM

## 2018-10-12 DIAGNOSIS — L30.9 DERMATITIS: ICD-10-CM

## 2018-10-12 DIAGNOSIS — C41.9 CHONDROSARCOMA (H): ICD-10-CM

## 2018-10-12 DIAGNOSIS — Z79.899 ENCOUNTER FOR LONG-TERM (CURRENT) USE OF MEDICATIONS: ICD-10-CM

## 2018-10-12 DIAGNOSIS — Z23 NEED FOR PROPHYLACTIC VACCINATION AND INOCULATION AGAINST INFLUENZA: ICD-10-CM

## 2018-10-12 LAB
BASOPHILS # BLD AUTO: 0 10E9/L (ref 0–0.2)
BASOPHILS NFR BLD AUTO: 0.5 %
DIFFERENTIAL METHOD BLD: ABNORMAL
EOSINOPHIL # BLD AUTO: 0.1 10E9/L (ref 0–0.7)
EOSINOPHIL NFR BLD AUTO: 1.1 %
ERYTHROCYTE [DISTWIDTH] IN BLOOD BY AUTOMATED COUNT: 12.8 % (ref 10–15)
HBA1C MFR BLD: 6.7 % (ref 0–5.6)
HCT VFR BLD AUTO: 38.7 % (ref 40–53)
HGB BLD-MCNC: 13 G/DL (ref 13.3–17.7)
LYMPHOCYTES # BLD AUTO: 0.9 10E9/L (ref 0.8–5.3)
LYMPHOCYTES NFR BLD AUTO: 20.5 %
MCH RBC QN AUTO: 29.4 PG (ref 26.5–33)
MCHC RBC AUTO-ENTMCNC: 33.6 G/DL (ref 31.5–36.5)
MCV RBC AUTO: 88 FL (ref 78–100)
MONOCYTES # BLD AUTO: 0.5 10E9/L (ref 0–1.3)
MONOCYTES NFR BLD AUTO: 11.4 %
NEUTROPHILS # BLD AUTO: 2.9 10E9/L (ref 1.6–8.3)
NEUTROPHILS NFR BLD AUTO: 66.5 %
PLATELET # BLD AUTO: 276 10E9/L (ref 150–450)
RBC # BLD AUTO: 4.42 10E12/L (ref 4.4–5.9)
WBC # BLD AUTO: 4.4 10E9/L (ref 4–11)

## 2018-10-12 PROCEDURE — 99396 PREV VISIT EST AGE 40-64: CPT | Mod: 25 | Performed by: FAMILY MEDICINE

## 2018-10-12 PROCEDURE — 36415 COLL VENOUS BLD VENIPUNCTURE: CPT | Performed by: FAMILY MEDICINE

## 2018-10-12 PROCEDURE — 85025 COMPLETE CBC W/AUTO DIFF WBC: CPT | Performed by: FAMILY MEDICINE

## 2018-10-12 PROCEDURE — 80061 LIPID PANEL: CPT | Performed by: FAMILY MEDICINE

## 2018-10-12 PROCEDURE — 83036 HEMOGLOBIN GLYCOSYLATED A1C: CPT | Performed by: FAMILY MEDICINE

## 2018-10-12 PROCEDURE — G0008 ADMIN INFLUENZA VIRUS VAC: HCPCS | Performed by: FAMILY MEDICINE

## 2018-10-12 PROCEDURE — 80053 COMPREHEN METABOLIC PANEL: CPT | Performed by: FAMILY MEDICINE

## 2018-10-12 PROCEDURE — G0103 PSA SCREENING: HCPCS | Performed by: FAMILY MEDICINE

## 2018-10-12 PROCEDURE — 90686 IIV4 VACC NO PRSV 0.5 ML IM: CPT | Performed by: FAMILY MEDICINE

## 2018-10-12 ASSESSMENT — PAIN SCALES - GENERAL: PAINLEVEL: EXTREME PAIN (8)

## 2018-10-12 NOTE — MR AVS SNAPSHOT
After Visit Summary   10/12/2018    Aden Vaz    MRN: [id number]           Patient Information     Date Of Birth          1960        Visit Information        Provider Department      10/12/2018 3:00 PM Melita Painter MD [facility]        Today's Diagnoses     Routine general medical examination at a health care facility    -  1    Type 2 diabetes mellitus without complication, with long-term current use of insulin (H)        Chondrosarcoma (H)        Multiple sclerosis (H)        Hyperlipidemia LDL goal <100        Dermatitis        Encounter for long-term (current) use of medications        Screening for prostate cancer          Care Instructions    You can try using Hydrocortisone on the spot on your forehead to help. If symptoms worsen, contact me.      Preventive Health Recommendations  Male Ages 50 - 64    Yearly exam:             See your health care provider every year in order to  o   Review health changes.   o   Discuss preventive care.    o   Review your medicines if your doctor has prescribed any.     Have a cholesterol test every 5 years, or more frequently if you are at risk for high cholesterol/heart disease.     Have a diabetes test (fasting glucose) every three years. If you are at risk for diabetes, you should have this test more often.     Have a colonoscopy at age 50, or have a yearly FIT test (stool test). These exams will check for colon cancer.      Talk with your health care provider about whether or not a prostate cancer screening test (PSA) is right for you.    You should be tested each year for STDs (sexually transmitted diseases), if you re at risk.     Shots: Get a flu shot each year. Get a tetanus shot every 10 years.     Nutrition:    Eat at least 5 servings of fruits and vegetables daily.     Eat whole-grain bread, whole-wheat pasta and brown rice instead of white grains and rice.     Get adequate Calcium and Vitamin D.      Lifestyle    Exercise for at least 150 minutes a week (30 minutes a day, 5 days a week). This will help you control your weight and prevent disease.     Limit alcohol to one drink per day.     No smoking.     Wear sunscreen to prevent skin cancer.     See your dentist every six months for an exam and cleaning.     See your eye doctor every 1 to 2 years.            Follow-ups after your visit        Follow-up notes from your care team     Return in about 6 months (around 4/12/2019) for Diabetes recheck.      Your next 10 appointments already scheduled     May 16, 2019  1:30 PM CDT   Return Visit with Josemanuel Zapata MD   [facility]    [address] [place] [id number] [phone]              Who to contact     If you have questions or need follow up information about today's clinic visit or your schedule please contact [facility] directly at [phone].  Normal or non-critical lab and imaging results will be communicated to you by DyMyndhart, letter or phone within 4 business days after the clinic has received the results. If you do not hear from us within 7 days, please contact the clinic through Dynasilt or phone. If you have a critical or abnormal lab result, we will notify you by phone as soon as possible.  Submit refill requests through Eletrog[employer]es or call your pharmacy and they will forward the refill request to us. Please allow 3 business days for your refill to be completed.          Additional Information About Your Visit        D[employer]Myndhar[employer]iefNet Information     E[employer] gives you secure access to your electronic health record. If you see a primary care provider, you can also send messages to your care team and make appointments. If you have questions, please call your primary care clinic.  If you do not have a primary care provider, please call [phone] and they will assist you.        Care EveryWhere ID     This is your Care  "EveryWhere ID. This could be used by other organizations to access your [facility] medical records  [id number]        Your Vitals Were     Pulse Temperature Respirations Height BMI (Body Mass Index)       79 97.5  F (36.4  C) (Oral) 18 6' 2\" (1.88 m) 22.62 kg/m2        Blood Pressure from Last 3 Encounters:   10/12/18 110/70   05/14/18 116/66   04/24/18 108/68    Weight from Last 3 Encounters:   10/12/18 176 lb 3.2 oz (79.9 kg)   05/14/18 178 lb (80.7 kg)   04/24/18 176 lb 8 oz (80.1 kg)              We Performed the Following     CBC with platelets and differential     Comprehensive metabolic panel     Hemoglobin A1c     Lipid panel reflex to direct LDL Fasting     PSA, screen          Today's Medication Changes          These changes are accurate as of 10/12/18  3:52 PM.  If you have any questions, ask your nurse or doctor.               These medicines have changed or have updated prescriptions.        Dose/Directions    order for DME   This may have changed:  Another medication with the same name was removed. Continue taking this medication, and follow the directions you see here.   Used for:  Chondrosarcoma (H)   Changed by:  Melita Painter MD        Crutch, forearm   Quantity:  1 Units   Refills:  0         Stop taking these medicines if you haven't already. Please contact your care team if you have questions.     amoxicillin 500 MG capsule   Commonly known as:  AMOXIL   Stopped by:  Melita Painter MD                    Primary Care Provider Office Phone # Fax #    Melita Painter [phone] [address] [facility] [address]        Equal Access to Services     [facility] AH: Hadii aad ku hadasho Soomaali, waaxda luqadaha, qaybta kaalmada adeegyada, bryanna rodriguez. So [facility] [phone].    ATENCIÓN: Si habla español, tiene a da silva disposición servicios gratuitos de asistencia lingüística. Llame al [phone].    We comply with applicable federal " civil rights laws and [place] laws. We do not discriminate on the basis of race, color, national origin, age, disability, sex, sexual orientation, or gender identity.            Thank you!     Thank you for choosing [facility]  for your care. Our goal is always to provide you with excellent care. Hearing back from our patients is one way we can continue to improve our services. Please take a few minutes to complete the written survey that you may receive in the mail after your visit with us. Thank you!             Your Updated Medication List - Protect others around you: Learn how to safely use, store and throw away your medicines at www.disposemymeds.org.          This list is accurate as of 10/12/18  3:52 PM.  Always use your most recent med list.                   Brand Name Dispense Instructions for use Diagnosis    aspirin 81 MG tablet      Take  by mouth daily.        BASAGLAR 100 UNIT/ML injection     15 mL    Inject 10 Units Subcutaneous daily    Type 2 diabetes mellitus without complication, with long-term current use of insulin (H)       DARIN CONTOUR test strip   Generic drug:  blood glucose monitoring     400 strip    USE TO TEST FOUR TIMES DAILY    Type 2 diabetes, HbA1c goal < 7% (H)       calcium + D 600-200 MG-UNIT Tabs   Generic drug:  calcium carbonate-vitamin D      1 Tablet daily        insulin lispro 100 UNIT/ML injection    HumaLOG KWIKpen    30 mL    ADMINISTER 10 UNITS UNDER THE SKIN THREE TIMES DAILY BEFORE MEALS    Type 2 diabetes mellitus without complication, with long-term current use of insulin (H)       insulin pen needle 31G X 5 MM    B-D U/F    300 each    USE 4 DAILY WITH NOVOLOG AND LANTUS PENS AS DIRECTED    Type 2 diabetes mellitus without complication, with long-term current use of insulin (H)       lisinopril 10 MG tablet    PRINIVIL/ZESTRIL    30 tablet    Take 1 tablet (10 mg) by mouth daily    Type 2 diabetes mellitus without complication, with long-term  current use of insulin (H)       order for DME     1 Units    Crutch, forearm    Chondrosarcoma (H)       simvastatin 80 MG tablet    ZOCOR    45 tablet    TAKE ONE-HALF TABLET BY MOUTH DAILY    CARDIOVASCULAR SCREENING; LDL GOAL LESS THAN 100       vitamin D3 2000 units Caps      1 CAPSULE DAILY

## 2018-10-12 NOTE — PROGRESS NOTES

## 2018-10-15 LAB
ALBUMIN SERPL-MCNC: 3.9 G/DL (ref 3.4–5)
ALP SERPL-CCNC: 108 U/L (ref 40–150)
ALT SERPL W P-5'-P-CCNC: 43 U/L (ref 0–70)
ANION GAP SERPL CALCULATED.3IONS-SCNC: 7 MMOL/L (ref 3–14)
AST SERPL W P-5'-P-CCNC: 30 U/L (ref 0–45)
BILIRUB SERPL-MCNC: 0.4 MG/DL (ref 0.2–1.3)
BUN SERPL-MCNC: 12 MG/DL (ref 7–30)
CALCIUM SERPL-MCNC: 9.2 MG/DL (ref 8.5–10.1)
CHLORIDE SERPL-SCNC: 98 MMOL/L (ref 94–109)
CHOLEST SERPL-MCNC: 155 MG/DL
CO2 SERPL-SCNC: 28 MMOL/L (ref 20–32)
CREAT SERPL-MCNC: 0.86 MG/DL (ref 0.66–1.25)
GFR SERPL CREATININE-BSD FRML MDRD: >90 ML/MIN/1.7M2
GLUCOSE SERPL-MCNC: 318 MG/DL (ref 70–99)
HDLC SERPL-MCNC: 48 MG/DL
LDLC SERPL CALC-MCNC: 85 MG/DL
NONHDLC SERPL-MCNC: 107 MG/DL
POTASSIUM SERPL-SCNC: 4.7 MMOL/L (ref 3.4–5.3)
PROT SERPL-MCNC: 8 G/DL (ref 6.8–8.8)
PSA SERPL-ACNC: 0.64 UG/L (ref 0–4)
SODIUM SERPL-SCNC: 133 MMOL/L (ref 133–144)
TRIGL SERPL-MCNC: 111 MG/DL

## 2018-11-07 DIAGNOSIS — Z13.6 CARDIOVASCULAR SCREENING; LDL GOAL LESS THAN 100: ICD-10-CM

## 2018-11-07 DIAGNOSIS — Z79.4 TYPE 2 DIABETES MELLITUS WITHOUT COMPLICATION, WITH LONG-TERM CURRENT USE OF INSULIN (H): ICD-10-CM

## 2018-11-07 DIAGNOSIS — E11.9 TYPE 2 DIABETES MELLITUS WITHOUT COMPLICATION, WITH LONG-TERM CURRENT USE OF INSULIN (H): ICD-10-CM

## 2018-11-08 RX ORDER — LISINOPRIL 10 MG/1
10 TABLET ORAL DAILY
Qty: 90 TABLET | Refills: 3 | Status: SHIPPED | OUTPATIENT
Start: 2018-11-08 | End: 2019-11-13

## 2018-11-08 RX ORDER — FLURBIPROFEN SODIUM 0.3 MG/ML
SOLUTION/ DROPS OPHTHALMIC
Qty: 300 EACH | Refills: 11 | Status: SHIPPED | OUTPATIENT
Start: 2018-11-08 | End: 2019-11-17

## 2018-11-08 RX ORDER — SIMVASTATIN 80 MG
TABLET ORAL
Qty: 45 TABLET | Refills: 2 | Status: SHIPPED | OUTPATIENT
Start: 2018-11-08 | End: 2019-08-10

## 2018-11-08 NOTE — TELEPHONE ENCOUNTER
"Requested Prescriptions   Pending Prescriptions Disp Refills     simvastatin (ZOCOR) 80 MG tablet [Pharmacy Med Name: SIMVASTATIN 80MG TABLETS] 45 tablet 0     Sig: TAKE ONE-HALF TABLET BY MOUTH DAILY    Statins Protocol Passed    11/7/2018  1:16 PM       Passed - LDL on file in past 12 months    Recent Labs   Lab Test  10/12/18   1545   LDL  85          Passed - No abnormal creatine kinase in past 12 months    No lab results found.        Passed - Recent (12 mo) or future (30 days) visit within the authorizing provider's specialty    Patient had office visit in the last 12 months or has a visit in the next 30 days with authorizing provider or within the authorizing provider's specialty.  See \"Patient Info\" tab in inbasket, or \"Choose Columns\" in Meds & Orders section of the refill encounter.         Passed - Patient is age 18 or older        B-D U/F insulin pen needle [Pharmacy Med Name: B-D PEN NDL MINI 61CT4NQ(3/16)PRPL] 300 each 0     Sig: USE FOUR TIMES DAILY WITH NOVOLOG AND LANTUS PENS AS DIRECTED    Diabetic Supplies Protocol Passed    11/7/2018  1:16 PM       Passed - Patient is 18 years of age or older       Passed - Recent (6 mo) or future (30 days) visit within the authorizing provider's specialty    Patient had office visit in the last 6 months or has a visit in the next 30 days with authorizing provider.  See \"Patient Info\" tab in inbasket, or \"Choose Columns\" in Meds & Orders section of the refill encounter.          simvastatin (ZOCOR) 80 MG tablet  Prescription approved per [facility] Refill Protocol.    insulin pen needle (B-D U/F) 31G X 5 MM  Prescription approved per [facility] Refill Protocol.    Soheila Vaz, RN, BSN     "

## 2018-11-20 DIAGNOSIS — E11.9 TYPE 2 DIABETES MELLITUS WITHOUT COMPLICATION, WITH LONG-TERM CURRENT USE OF INSULIN (H): ICD-10-CM

## 2018-11-20 DIAGNOSIS — Z79.4 TYPE 2 DIABETES MELLITUS WITHOUT COMPLICATION, WITH LONG-TERM CURRENT USE OF INSULIN (H): ICD-10-CM

## 2018-11-21 DIAGNOSIS — Z79.4 TYPE 2 DIABETES MELLITUS WITHOUT COMPLICATION, WITH LONG-TERM CURRENT USE OF INSULIN (H): ICD-10-CM

## 2018-11-21 DIAGNOSIS — E11.9 TYPE 2 DIABETES MELLITUS WITHOUT COMPLICATION, WITH LONG-TERM CURRENT USE OF INSULIN (H): ICD-10-CM

## 2018-11-21 NOTE — TELEPHONE ENCOUNTER
Requested Prescriptions   Pending Prescriptions Disp Refills     insulin lispro (HUMALOG KWIKPEN) 100 UNIT/ML injection 30 mL 0     Sig: ADMINISTER 10 UNITS UNDER THE SKIN THREE TIMES DAILY BEFORE MEALS    There is no refill protocol information for this order        insulin lispro (HUMALOG KWIKPEN) 100 UNIT/ML injection  Routing refill request to provider for review/approval because:  Drug not on the G refill protocol   Soheila Vaz RN, BSN

## 2018-11-23 RX ORDER — INSULIN LISPRO 100 [IU]/ML
INJECTION, SOLUTION INTRAVENOUS; SUBCUTANEOUS
Qty: 30 ML | Refills: 0 | OUTPATIENT
Start: 2018-11-23

## 2018-11-23 NOTE — TELEPHONE ENCOUNTER
"Requested Prescriptions   Pending Prescriptions Disp Refills     HUMALOG KWIKPEN 100 UNIT/ML soln [Pharmacy Med Name: HUMALOG 100 U/ML KWIK PEN INJ 3ML] 30 mL 0     Sig: ADMINISTER 10 UNITS UNDER THE SKIN THREE TIMES DAILY BEFORE MEALS    Short Acting Insulin Protocol Passed    11/21/2018 10:33 AM       Passed - Blood pressure less than 140/90 in past 6 months    BP Readings from Last 3 Encounters:   10/12/18 110/70   05/14/18 116/66   04/24/18 108/68          Passed - LDL on file in past 12 months    Recent Labs   Lab Test  10/12/18   1545   LDL  85          Passed - Microalbumin on file in past 12 months    Recent Labs   Lab Test  04/24/18   1352   MICROL  <5   UMALCR  Unable to calculate due to low value          Passed - Serum creatinine on file in past 12 months    Recent Labs   Lab Test  10/12/18   1545   CR  0.86          Passed - HgbA1C in past 3 or 6 months    If HgbA1C is 8 or greater, it needs to be on file within the past 3 months.  If less than 8, must be on file within the past 6 months.     Recent Labs   Lab Test  10/12/18   1545   A1C  6.7*          Passed - Patient is age 18 or older       Passed - Recent (6 mo) or future (30 days) visit within the authorizing provider's specialty    Patient had office visit in the last 6 months or has a visit in the next 30 days with authorizing provider or within the authorizing provider's specialty.  See \"Patient Info\" tab in inbasket, or \"Choose Columns\" in Meds & Orders section of the refill encounter.            Last OV 10/12/2018  Last filled 11/21/2018    Was just filled.    Bruce Chowdary, RN, BSN          "

## 2019-01-21 ENCOUNTER — TELEPHONE (OUTPATIENT)
Dept: FAMILY MEDICINE | Facility: CLINIC | Age: 59
End: 2019-01-21

## 2019-01-21 DIAGNOSIS — E11.9 TYPE 2 DIABETES MELLITUS WITHOUT COMPLICATION, WITH LONG-TERM CURRENT USE OF INSULIN (H): Primary | ICD-10-CM

## 2019-01-21 DIAGNOSIS — Z79.4 TYPE 2 DIABETES MELLITUS WITHOUT COMPLICATION, WITH LONG-TERM CURRENT USE OF INSULIN (H): Primary | ICD-10-CM

## 2019-01-21 RX ORDER — GLUCOSAMINE HCL/CHONDROITIN SU 500-400 MG
CAPSULE ORAL
Qty: 100 EACH | Refills: 3 | Status: SHIPPED | OUTPATIENT
Start: 2019-01-21

## 2019-01-21 NOTE — TELEPHONE ENCOUNTER
Reason for Call:  Other prescription    Detailed comments: Patient is unable to make appt tomorrow because his car is in the shop. Patient wants to know if he can switch back to his old insulin due to insurance change.     Please advise     Phone Number Patient can be reached at: Home number on file [phone] (home)    Best Time: Anytime     Can we leave a detailed message on this number? YES    Call taken on 1/21/2019 at 2:54 PM by Argentina Barnhart

## 2019-02-26 NOTE — PROGRESS NOTES
"  SUBJECTIVE:   Aden Vaz is a 58 year old male who presents to clinic today for the following health issues:    HPI  Concern - spot on left eye brow   Onset: 1 month ago     Description:   Noticed brown spot on the left eye brow    Intensity: none     Progression of Symptoms:  same    Accompanying Signs & Symptoms:  Itching, the color changed from pinkish to brown.     Previous history of similar problem:   None     Precipitating factors:   Worsened by: none    Alleviating factors:  Improved by: none     Therapies Tried and outcome: none   Diabetes Follow-up  Patient would like to discuss about changing back to Novlog and needs refills for test strips \"3 times/day\"   Patient is checking blood sugars: twice daily.    Blood sugar testing frequency justification: On insulin, frequency appropriate   Results are as follows:         am - before breakfast this morning 97         lunchtime - before lunch normally runs about     Diabetic concerns: None     Symptoms of hypoglycemia (low blood sugar): shaky, sweats and difficult to concentrate      Paresthesias (numbness or burning in feet) or sores: No change      Date of last diabetic eye exam: 7/26/18; reported no change     BP Readings from Last 2 Encounters:   10/12/18 110/70   05/14/18 116/66     Hemoglobin A1C (%)   Date Value   10/12/2018 6.7 (H)   04/24/2018 6.6 (H)     LDL Cholesterol Calculated (mg/dL)   Date Value   10/12/2018 85   10/11/2017 98       Diabetes Management Resources  Hyperlipidemia Follow-Up      Rate your low fat/cholesterol diet?: good    Taking statin?  Yes, no muscle aches from statin    Other lipid medications/supplements?:  none    Hypertension Follow-up      Outpatient blood pressures are not being checked.    Low Salt Diet: low salt     Diabetes   The patient states that his Humalog needs to be changed back to Novolog. He states that this needs to occur because of his insurance changing and it is a new year. The patient states " that he usually takes 6 units during breakfast, 4 units at lunch and 10 while eating dinner.   He notes that his left foot may be more swollen at times, compared to his left foot.       Blood sugars   The patient states that the past couple of days, he has noticed his blood sugars have been high. He states that this is due to him being inside and not doing anything. He also needs his test strips refilled.     Mole on left eyebrow  The patient states that he has a spot on his face that turned brown. At first, he thought it was psoriasis. The spot then turned brown and now looks like a mole.     MS  The patient states that his MS is doing well. He notes that Dr. Zapata is following him closely. He notes that he is happy with his progress with MS. He states that he is living a relatively good life for all that he has had going on with his health.     Tingling in left arm   The patient states that he notices a tingling in his left arm. He states that it may be due to the way he sleeps. He states that he will be sitting, and it will randomly come on. He notes that if he moves, the sensation will go away.    Problem list and histories reviewed & adjusted, as indicated.  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)     Chondrosarcoma (H)     Advanced directives, counseling/discussion     Type A blood, Rh positive     Esophageal reflux     Raynaud's disease without gangrene     Failed allograft of bone     Pelvis fracture (H)     Type 2 diabetes mellitus without complication, with long-term current use of insulin (H)     Hyperlipidemia LDL goal <100     Mechanical complication of prosthetic hip implant, subsequent  encounter     Past Surgical History:   Procedure Laterality Date     ARTHROPLASTY REVISION HIP Left 2015    Procedure: ARTHROPLASTY REVISION HIP;  Surgeon: Payam Gage MD;  Location: [facility]     BIOPSY  2003     C HAND/FINGER SURGERY UNLISTED       C PELVIS/HIP JOINT SURGERY UNLISTED       COMBINED CYSTOSCOPY, INSERT STENT URETER(S) Left 2015    Procedure: COMBINED CYSTOSCOPY, INSERT STENT URETER(S);  Surgeon: Jass King MD;  Location: [facility] OR     OPTICAL TRACKING SYSTEM OPEN REDUCTION INTERNAL FIXATION PELVIS Left 2015    Procedure: OPTICAL TRACKING SYSTEM OPEN REDUCTION INTERNAL FIXATION PELVIS;  Surgeon: Payam Gage MD;  Location: [facility] OR     ORTHOPEDIC SURGERY       PAST SURGICAL HISTORY      several pelvic surgeries for osteosarcoma     RELEASE CARPAL TUNNEL  2012    Procedure: RELEASE CARPAL TUNNEL;  Right carpal tunnel release;  Surgeon: Mery Link MD;  Location: [facility] OR     SOFT TISSUE SURGERY         Social History     Tobacco Use     Smoking status: Former Smoker     Packs/day: 1.50     Years: 30.00     Pack years: 45.00     Types: Cigarettes     Last attempt to quit: 12/10/2002     Years since quittin.2     Smokeless tobacco: Never Used   Substance Use Topics     Alcohol use: No     Comment: Sober for 25 years     Family History   Problem Relation Age of Onset     Genitourinary Problems Mother         Liver disease     Breast Cancer Mother      Heart Disease Father      Cardiac Sudden Death Father      Cancer Sister      Prostate Cancer Other      Asthma No family hx of      C.A.D. No family hx of      Diabetes No family hx of      Hypertension No family hx of      Cerebrovascular Disease No family hx of      Cancer - colorectal No family hx of      Alcohol/Drug No family hx of      Allergies No family hx of      Alzheimer Disease No family hx of      Anesthesia Reaction No family hx of      Blood Disease No family hx of      Arthritis No family hx of       Cardiovascular No family hx of      Circulatory No family hx of      Congenital Anomalies No family hx of      Connective Tissue Disorder No family hx of      Depression No family hx of      Endocrine Disease No family hx of      Eye Disorder No family hx of      Genetic Disorder No family hx of      Gastrointestinal Disease No family hx of      Gynecology No family hx of      Lipids No family hx of      Hearing Loss No family hx of      Family History Negative No family hx of      Respiratory No family hx of      Psychotic Disorder No family hx of      Osteoporosis No family hx of      Obesity No family hx of      Neurologic Disorder No family hx of      Musculoskeletal Disorder No family hx of      Thyroid Disease No family hx of          Current Outpatient Medications   Medication Sig Dispense Refill     alcohol swab prep pads Use to swab area of injection/jorge as directed. 100 each 3     aspirin 81 MG tablet Take  by mouth daily.  3     B-D U/F insulin pen needle USE FOUR TIMES DAILY WITH NOVOLOG AND LANTUS PENS AS DIRECTED 300 each 11     DARIN CONTOUR test strip USE TO TEST FOUR TIMES DAILY 400 strip 1     CALCIUM + D 600-200 MG-UNIT PO TABS 1 Tablet daily       insulin glargine (LANTUS SOLOSTAR PEN) 100 UNIT/ML pen Inject 10 Units Subcutaneous At Bedtime 9 mL 3     insulin lispro (HUMALOG KWIKPEN) 100 UNIT/ML injection ADMINISTER 10 UNITS UNDER THE SKIN THREE TIMES DAILY BEFORE MEALS 30 mL 0     lisinopril (PRINIVIL/ZESTRIL) 10 MG tablet Take 1 tablet (10 mg) by mouth daily 90 tablet 3     ORDER FOR DME Crutch, forearm 1 Units 0     simvastatin (ZOCOR) 80 MG tablet TAKE ONE-HALF TABLET BY MOUTH DAILY 45 tablet 2     VITAMIN D3 2000 UNIT PO CAPS 1 CAPSULE DAILY       Allergies   Allergen Reactions     No Known Drug Allergies      Recent Labs   Lab Test 10/12/18  1545 04/24/18  1351 10/11/17  1427 04/11/17  1624 10/04/16  0955   A1C 6.7* 6.6* 6.7* 6.9* 6.7*   LDL 85  --  98  --  98   HDL 48  --  48  --  49  "  TRIG 111  --  91  --  64   ALT 43 44  --  50 36   CR 0.86 0.95  --  0.96 0.98   GFRESTIMATED >90 81  --  81 78   GFRESTBLACK >90 >90  --  >90   GFR Calc   >90   GFR Calc     POTASSIUM 4.7 4.1  --  4.3 4.9   TSH  --   --   --  1.60 1.72      BP Readings from Last 3 Encounters:   03/06/19 124/70   10/12/18 110/70   05/14/18 116/66    Wt Readings from Last 3 Encounters:   03/06/19 82.1 kg (181 lb)   10/12/18 79.9 kg (176 lb 3.2 oz)   05/14/18 80.7 kg (178 lb)        ROS:  CONSTITUTIONAL: NEGATIVE for fever, chills, change in weight  INTEGUMENTARY/SKIN: NEGATIVE for worrisome rashes or lesions; POSITIVE for mole on left eyebrow  ENT/MOUTH: NEGATIVE for ear, mouth and throat problems  RESP: NEGATIVE for significant cough or SOB  CV: NEGATIVE for chest pain, palpitations or peripheral edema  EYE: POSITIVE for bright white light in left eye and peripheral vision disruption  MS: POSITIVE for tingling in left arm and tingling in both feet     This document serves as a record of the services and decisions personally performed and made by Melita Painter MD. It was created on her behalf by Ninoska Newby, a trained medical scribe. The creation of this document is based the provider's statements to the medical scribe.    Ninoska Newby March 6, 2019 2:35 PM  OBJECTIVE:     /70   Pulse 82   Temp 97.8  F (36.6  C) (Oral)   Ht 1.88 m (6' 2\")   Wt 82.1 kg (181 lb)   SpO2 99%   BMI 23.24 kg/m    Body mass index is 23.24 kg/m .  GENERAL: healthy, alert and no distress  HENT: mouth without ulcers or lesions  RESP: lungs clear to auscultation - no rales, rhonchi or wheezes  CV: regular rate and rhythm, normal S1 S2, no S3 or S4, no murmur, click or rub, no peripheral edema and peripheral pulses strong  MS: no gross musculoskeletal defects noted, no edema  SKIN: no suspicious lesions or rashes, 2 by 2 mm seborrheic keratosis on left eyebrow  Diabetic foot exam: normal DP and PT pulses, no " trophic changes or ulcerative lesions, normal sensory exam and normal monofilament exam    Diagnostic Test Results:  No results found for this or any previous visit (from the past 24 hour(s)).    ASSESSMENT/PLAN:   1. Type 2 diabetes mellitus without complication, with long-term current use of insulin (H)  Patient reports Humalog prescription needs to be changed back to Novolog- due to insurance company changing coverage with start of new year.   Novolog has been prescribed.  Refills have been ordered.  Labs have been ordered.  Awaiting results.   - blood glucose (DARIN CONTOUR) test strip; Use to test blood sugar 3 times daily or as directed.  Dispense: 400 strip; Refill: 3  - insulin aspart (NOVOLOG PEN) 100 UNIT/ML pen; 6 units with breakfast, 4 units at lunch and 10 units at dinner  Dispense: 30 mL; Refill: 5  - Hemoglobin A1c  - Albumin Random Urine Quantitative with Creat Ratio  - TSH with free T4 reflex    2. Hyperlipidemia LDL goal <100  Doing well. Well controlled. Tolerating medication.  No change in plan.     3. Multiple sclerosis (H)  Patient is doing well.   Patient will continue care with Neurology as planned.   He has rare concerns.     4. Seborrheic keratosis  Patient reports having mole on left eyebrow.  Exam showed 2 by 2 mm seborrheic keratosis on left eyebrow.  Advised patient that if spot gets darker in color, contact me.     5. Screen for colon cancer  Patient is due for colon cancer screening.   FIT test has been ordered.  Awaiting results.   - Fecal colorectal cancer screen (FIT); Future    6. Screening for diabetic peripheral neuropathy  Diabetic foot exam was administered today.   Exam showed no abnormalities.   - FOOT EXAM  NO CHARGE [[id number]]    Follow up- Come back in 7 months for physical exam.     The information in this document, created by the medical scribe for me, accurately reflects the services I personally performed and the decisions made by me. I have reviewed and approved  this document for accuracy prior to leaving the patient care area.    DONALD CALDERON MD, MD  [facility]

## 2019-03-06 ENCOUNTER — OFFICE VISIT (OUTPATIENT)
Dept: FAMILY MEDICINE | Facility: CLINIC | Age: 59
End: 2019-03-06
Payer: MEDICARE

## 2019-03-06 VITALS
BODY MASS INDEX: 23.23 KG/M2 | HEART RATE: 82 BPM | SYSTOLIC BLOOD PRESSURE: 124 MMHG | DIASTOLIC BLOOD PRESSURE: 70 MMHG | HEIGHT: 74 IN | TEMPERATURE: 97.8 F | OXYGEN SATURATION: 99 % | WEIGHT: 181 LBS

## 2019-03-06 DIAGNOSIS — L82.1 SEBORRHEIC KERATOSIS: ICD-10-CM

## 2019-03-06 DIAGNOSIS — E11.9 TYPE 2 DIABETES MELLITUS WITHOUT COMPLICATION, WITH LONG-TERM CURRENT USE OF INSULIN (H): Primary | ICD-10-CM

## 2019-03-06 DIAGNOSIS — Z12.11 SCREEN FOR COLON CANCER: ICD-10-CM

## 2019-03-06 DIAGNOSIS — E78.5 HYPERLIPIDEMIA LDL GOAL <100: ICD-10-CM

## 2019-03-06 DIAGNOSIS — Z13.89 SCREENING FOR DIABETIC PERIPHERAL NEUROPATHY: ICD-10-CM

## 2019-03-06 DIAGNOSIS — Z79.4 TYPE 2 DIABETES MELLITUS WITHOUT COMPLICATION, WITH LONG-TERM CURRENT USE OF INSULIN (H): Primary | ICD-10-CM

## 2019-03-06 DIAGNOSIS — G35 MULTIPLE SCLEROSIS (H): ICD-10-CM

## 2019-03-06 LAB
CREAT UR-MCNC: 25 MG/DL
HBA1C MFR BLD: 6.8 % (ref 0–5.6)
MICROALBUMIN UR-MCNC: <5 MG/L
MICROALBUMIN/CREAT UR: NORMAL MG/G CR (ref 0–17)
TSH SERPL DL<=0.005 MIU/L-ACNC: 1.24 MU/L (ref 0.4–4)

## 2019-03-06 PROCEDURE — 99207 C FOOT EXAM  NO CHARGE: CPT | Performed by: FAMILY MEDICINE

## 2019-03-06 PROCEDURE — 36415 COLL VENOUS BLD VENIPUNCTURE: CPT | Performed by: FAMILY MEDICINE

## 2019-03-06 PROCEDURE — 84443 ASSAY THYROID STIM HORMONE: CPT | Performed by: FAMILY MEDICINE

## 2019-03-06 PROCEDURE — 83036 HEMOGLOBIN GLYCOSYLATED A1C: CPT | Performed by: FAMILY MEDICINE

## 2019-03-06 PROCEDURE — 82043 UR ALBUMIN QUANTITATIVE: CPT | Performed by: FAMILY MEDICINE

## 2019-03-06 PROCEDURE — 99214 OFFICE O/P EST MOD 30 MIN: CPT | Performed by: FAMILY MEDICINE

## 2019-03-06 ASSESSMENT — PAIN SCALES - GENERAL: PAINLEVEL: MODERATE PAIN (5)

## 2019-03-06 ASSESSMENT — MIFFLIN-ST. JEOR: SCORE: 1705.76

## 2019-03-12 ENCOUNTER — TELEPHONE (OUTPATIENT)
Dept: FAMILY MEDICINE | Facility: CLINIC | Age: 59
End: 2019-03-12

## 2019-03-12 NOTE — TELEPHONE ENCOUNTER
Reason for call:  Form  Reason for Call:  Form, our goal is to have forms completed with 72 hours, however, some forms may require a visit or additional information.    Type of letter, form or note:  Medical    Who is the form from?: [employer]      Where did the form come from: form was faxed in    What clinic location was the form placed at?: [facility] - [phone]    Where the form was placed: 's in box     What number is listed as a contact on the form?: [phone]        Additional comments: Fax back to   [phone]  Call taken on 3/12/2019 at 3:26 PM by Parminder Bradley

## 2019-03-12 NOTE — TELEPHONE ENCOUNTER
Reason for call:  Form  Reason for Call:  Form, our goal is to have forms completed with 72 hours, however, some forms may require a visit or additional information.    Type of letter, form or note:  medical    Who is the form from?:  [employer] - diabetic supplies    Where did the form come from: form was faxed in    What clinic location was the form placed at?: [facility] - [phone]    Where the form was placed: 's Box    What number is listed as a contact on the form?:  [phone]       Additional comments:  Fax to [phone]    created by Dianna Kirkland

## 2019-03-27 DIAGNOSIS — Z79.4 TYPE 2 DIABETES MELLITUS WITHOUT COMPLICATION, WITH LONG-TERM CURRENT USE OF INSULIN (H): ICD-10-CM

## 2019-03-27 DIAGNOSIS — E11.9 TYPE 2 DIABETES MELLITUS WITHOUT COMPLICATION, WITH LONG-TERM CURRENT USE OF INSULIN (H): ICD-10-CM

## 2019-03-27 NOTE — TELEPHONE ENCOUNTER
"Requested Prescriptions   Pending Prescriptions Disp Refills     blood glucose (DARIN CONTOUR) test strip 400 strip 3    Last Written Prescription Date:  3/6/19  Last Fill Quantity: 400 strip,  # refills: 3   Last office visit: 3/6/2019 with prescribing provider:  Melita Painter     Future Office Visit:   Next 5 appointments (look out 90 days)    Apr 12, 2019  3:00 PM CDT  Office Visit with Melita Painter MD  [facility] [id number] [place] [address], [address]  [facility] [id number] [phone]   May 16, 2019  1:30 PM CDT  Return Visit with Josemanuel Zapata MD  [facility] [address] [place] [id number] [phone]          Sig: Use to test blood sugar 3 times daily or as directed.    Diabetic Supplies Protocol Passed - 3/27/2019  9:42 AM       Passed - Medication is active on med list       Passed - Patient is 18 years of age or older       Passed - Recent (6 mo) or future (30 days) visit within the authorizing provider's specialty    Patient had office visit in the last 6 months or has a visit in the next 30 days with authorizing provider.  See \"Patient Info\" tab in inbasket, or \"Choose Columns\" in Meds & Orders section of the refill encounter.              "

## 2019-04-03 PROCEDURE — 82274 ASSAY TEST FOR BLOOD FECAL: CPT | Performed by: FAMILY MEDICINE

## 2019-04-08 ENCOUNTER — TELEPHONE (OUTPATIENT)
Dept: FAMILY MEDICINE | Facility: CLINIC | Age: 59
End: 2019-04-08

## 2019-04-08 NOTE — TELEPHONE ENCOUNTER
Reason for call:  Form  Reason for Call:  Form, our goal is to have forms completed with 72 hours, however, some forms may require a visit or additional information.    Type of letter, form or note:  medical    Who is the form from?:  [employer] - form for diabetic supplies    Where did the form come from: form was faxed in    What clinic location was the form placed at?: [facility] - [phone]    Where the form was placed: 's Box    What number is listed as a contact on the form?:  [phone]       Additional comments:  Fax to [phone]    created by Dianna Kirkland

## 2019-04-09 DIAGNOSIS — Z12.11 SCREEN FOR COLON CANCER: ICD-10-CM

## 2019-04-09 LAB — HEMOCCULT STL QL IA: NEGATIVE

## 2019-04-26 ENCOUNTER — TELEPHONE (OUTPATIENT)
Dept: FAMILY MEDICINE | Facility: CLINIC | Age: 59
End: 2019-04-26

## 2019-04-26 NOTE — TELEPHONE ENCOUNTER
Reason for call:  Form  Reason for Call:  Form, our goal is to have forms completed with 72 hours, however, some forms may require a visit or additional information.    Type of letter, form or note:  medical    Who is the form from?:  [employer] - diabetic supplies form    Where did the form come from: form was faxed in    What clinic location was the form placed at?: [facility] - [phone]    Where the form was placed: 's Box    What number is listed as a contact on the form?:          Additional comments:  Fax to [phone]    created by Dianna Kirkland

## 2019-05-16 ENCOUNTER — OFFICE VISIT (OUTPATIENT)
Dept: NEUROLOGY | Facility: CLINIC | Age: 59
End: 2019-05-16
Payer: MEDICARE

## 2019-05-16 VITALS
BODY MASS INDEX: 21.99 KG/M2 | SYSTOLIC BLOOD PRESSURE: 134 MMHG | WEIGHT: 171.3 LBS | DIASTOLIC BLOOD PRESSURE: 68 MMHG | HEART RATE: 78 BPM | OXYGEN SATURATION: 96 %

## 2019-05-16 DIAGNOSIS — G35 MS (MULTIPLE SCLEROSIS) (H): Primary | ICD-10-CM

## 2019-05-16 PROCEDURE — 99214 OFFICE O/P EST MOD 30 MIN: CPT | Performed by: PSYCHIATRY & NEUROLOGY

## 2019-05-16 ASSESSMENT — PAIN SCALES - GENERAL: PAINLEVEL: NO PAIN (0)

## 2019-05-16 NOTE — Clinical Note
Irma you call the patient and schedule an appointment for one year from now.SincerelyAndrew TAURUS Zapata MD [facility] Neurologist 05/16/19

## 2019-05-16 NOTE — PROGRESS NOTES
"MULTIPLE SCLEROSIS CLINIC AT THE [facility]  FOLLOWUP/ESTABLISHED PATIENT VISIT      PRINCIPAL NEUROLOGIC DIAGNOSIS: Multiple Sclerosis    Date of Onset: 1998  Date of Diagnosis: 1998  Initial Clinical Course: Relapsing Remitting  Current Clinical Course: Relapsing Remitting  Past Disease Modifying Therapy(ies): Copaxone  Current Disease Modifying Therapy(ies):NA  Most Recent MRI of the Brain: 3/27/12  Most Recent MRI of the Cervical Cord NA  Most Recent MRI of the Thoracic Cord: NA  Most Recent Lumbar Puncture: NA  Most Recent OCT: NA   Most Recent JCV:  NA  Most Recent Remote Hepatitis Panel:  NA  Most Recent VZV IgG:  NA  Most Recent TB Quant:  NA           CHIEF COMPLAINT: Follow up off DMT      INTERVAL HISTORY:    Overall he is unchanged. He reports that he has good days and bad days. Some days he has worse fatigue or brain fog. Overall he does not think that this is worse. Typically his diabetis is harder to control at this time of year,    Issues with current MS therapy: Not on DMT    REVIEW OF SYSTEMS:    Mood: unchanged and depressed  Spasticity:none, painful, unchanged and relatively rare, it worse in the past  Bladder: frequency, hesitancy and unchanged  Bowel: unchanged  Pain related to today's visit:reviewed on nursing intake documentation  Fatigue: unchanged  Sleep: well/ no problem and he normally gets 8 hours of sleep. He sometimes gets 7 hours if something wakes him up  Memory/Concentration: unchanged      Otherwise 10 point ROS was neg other than the symptoms noted above.    PAST HISTORY was reviewed and updated:      MEDICATIONS and ALLERGIES were reviewed and updated.    SOCIAL HISTORY was reviewed and updated:        EXAM:    PHYSICAL EXAMINATION:   VITAL SIGNS:  Vital signs:      BP: 134/68 Pulse: 78     SpO2: 96 %       Weight: 77.7 kg (171 lb 4.8 oz)  Estimated body mass index is 21.99 kg/m  as calculated from the following:    Height as of 3/6/19: 1.88 m (6' 2\").    Weight as " of this encounter: 77.7 kg (171 lb 4.8 oz).            GENERAL: The patient is a well-nourished  who presents to the evaluation alone.  NEUROLOGIC:   MENTAL STATUS: Alert,awake and  oriented times four.   CRANIAL NERVES: Visual fields are full to confrontation. The pupils are  round and react to light and there is no Star Gladys pupil.  Extraocular movements are  intact with no  internuclear ophthalmoplegia. No nystagmus. Facial strength is normal but sensation is reduced on the left. Hearing is  normal. Palate elevation and tongue protrusion are  normal    POWER:     Motor    Upper      Right Left   Shoulder Abduction 5 5   Elbow Flexion 5 5   Elbow Extension 5 5   Wrist Extension 5 5   Digit Extension 5 5   Digit Flexion 5 5   APB 5 5   Tone 0 0   Lower       Right Left   Hip Flexion 5- 3   Knee Extension 5 5   Knee Flexion 5 5   Foot Dorsiflexion 4 4   Foot Plantar Flexion 4 4   EH 5 4   Toe Flexion 5 4   Tone 0 0           Grade Description   0 No increase in muscle tone   1 Slight increase in muscle tone, manifested by a catch and release or by minimal resistance at the end of the range of motion when the affected part(s) is moved in flexion or extension   1+ Slight increase in muscle tone, manifested by a catch, followed by minimal resistance throughout the remainder (less than half) of the ROM   2 More marked increase in muscle tone through most of the ROM, but affected part(s) easily moved   3 Considerable increase in muscle tone, passive movement difficult   4 Affected part(s) rigid in flexion or extension           SENSORY:     Light touch:  left hemisensory loss        MOTOR/CEREBELLAR:    Right Left   RRM 0 Normal 0 Normal   ADRIEL 0 Normal 0 Normal   FTN 0 Normal 0 Normal   RRM 0 Normal 0 Normal   HKS 0 Normal 0 Normal           GAIT: Gait is   narrow-based and steady and the patient is  able to walk on heels, toes and in tandem without difficulty.    Romberg: Stable with eye(s)  closed    RESULTS:  Monitoring labs:    Orders Only on 04/09/2019   Component Date Value Ref Range Status     Occult Blood Scn FIT 04/03/2019 Negative  NEG^Negative Final   Office Visit on 03/06/2019   Component Date Value Ref Range Status     Hemoglobin A1C 03/06/2019 6.8* 0 - 5.6 % Final     Creatinine Urine 03/06/2019 25  mg/dL Final     Albumin Urine mg/L 03/06/2019 <5  mg/L Final     Albumin Urine mg/g Cr 03/06/2019 Unable to calculate due to low value  0 - 17 mg/g Cr Final     TSH 03/06/2019 1.24  0.40 - 4.00 mU/L Final   ]      MRI brain:    MRI brain: None    ASSESSMENT/PLAN:  The patient is a 58-year-old gentleman with a past medical history of cancer that is post multiple surgeries and primary progressive multiple sclerosis who is presenting today as a follow-up.  Overall, the last year the patient is largely remained clinically stable.  If anything he is stronger on his physical exam today.  I once again spent a prolonged period of time reviewing with him the potential treatments of his medical condition.  He once again is not interested in really trying any other treatments that are currently available.  I do not necessarily disagree with his decision.  Most these medications would only have modest benefit for him.  I spent time reviewing symptomatic treatment of his fatigue.  I advised that Prozac is sometimes used as treatment for fatigue and multiple sclerosis.  This that the advantage of being in nonaddictive substance given his history of alcohol abuse.  However, the patient was not interested in this.  I advised the patient if he would change his mind, then he should call my office.  If the patient continues to do well, I will have him follow-up in my office in 12 months.  Instructed him to call my office with any questions, concerns, issues or problems.    Follow-up in 1 year    I spent 25 minutes in this visit, with >50% direct patient time spent counseling about prognosis, treatment options, and  coordination of care.    Josemanuel Zapata MD [facility]  Staff Neurologist   05/16/19       (Chart documentation was completed in part with Dragon voice-recognition software. Even though reviewed, some grammatical, spelling, and word errors may remain.)

## 2019-05-16 NOTE — NURSING NOTE
Aden Vaz's goals for this visit include: return  He requests these members of his care team be copied on today's visit information:     PCP: Melita Painter    Referring Provider:  No referring provider defined for this encounter.    /68   Pulse 78   Wt 77.7 kg (171 lb 4.8 oz)   SpO2 96%   BMI 21.99 kg/m      Do you need any medication refills at today's visit? n

## 2019-05-16 NOTE — LETTER
5/16/2019         RE: Aden Vaz  [address]  [facility] [address]        Dear Colleague,    Thank you for referring your patient, Aden Vaz, to the [facility]. Please see a copy of my visit note below.    MULTIPLE SCLEROSIS CLINIC AT THE [facility]  FOLLOWUP/ESTABLISHED PATIENT VISIT      PRINCIPAL NEUROLOGIC DIAGNOSIS: Multiple Sclerosis    Date of Onset: 1998  Date of Diagnosis: 1998  Initial Clinical Course: Relapsing Remitting  Current Clinical Course: Relapsing Remitting  Past Disease Modifying Therapy(ies): Copaxone  Current Disease Modifying Therapy(ies):NA  Most Recent MRI of the Brain: 3/27/12  Most Recent MRI of the Cervical Cord NA  Most Recent MRI of the Thoracic Cord: NA  Most Recent Lumbar Puncture: NA  Most Recent OCT: NA   Most Recent JCV:  NA  Most Recent Remote Hepatitis Panel:  NA  Most Recent VZV IgG:  NA  Most Recent TB Quant:  NA           CHIEF COMPLAINT: Follow up off DMT      INTERVAL HISTORY:    Overall he is unchanged. He reports that he has good days and bad days. Some days he has worse fatigue or brain fog. Overall he does not think that this is worse. Typically his diabetis is harder to control at this time of year,    Issues with current MS therapy: Not on DMT    REVIEW OF SYSTEMS:    Mood: unchanged and depressed  Spasticity:none, painful, unchanged and relatively rare, it worse in the past  Bladder: frequency, hesitancy and unchanged  Bowel: unchanged  Pain related to today's visit:reviewed on nursing intake documentation  Fatigue: unchanged  Sleep: well/ no problem and he normally gets 8 hours of sleep. He sometimes gets 7 hours if something wakes him up  Memory/Concentration: unchanged      Otherwise 10 point ROS was neg other than the symptoms noted above.    PAST HISTORY was reviewed and updated:      MEDICATIONS and ALLERGIES were reviewed and updated.    SOCIAL HISTORY was reviewed and  "updated:        EXAM:    PHYSICAL EXAMINATION:   VITAL SIGNS:  Vital signs:      BP: 134/68 Pulse: 78     SpO2: 96 %       Weight: 77.7 kg (171 lb 4.8 oz)  Estimated body mass index is 21.99 kg/m  as calculated from the following:    Height as of 3/6/19: 1.88 m (6' 2\").    Weight as of this encounter: 77.7 kg (171 lb 4.8 oz).            GENERAL: The patient is a well-nourished  who presents to the evaluation alone.  NEUROLOGIC:   MENTAL STATUS: Alert,awake and  oriented times four.   CRANIAL NERVES: Visual fields are full to confrontation. The pupils are  round and react to light and there is no Star Gladys pupil.  Extraocular movements are  intact with no  internuclear ophthalmoplegia. No nystagmus. Facial strength is normal but sensation is reduced on the left. Hearing is  normal. Palate elevation and tongue protrusion are  normal    POWER:     Motor    Upper      Right Left   Shoulder Abduction 5 5   Elbow Flexion 5 5   Elbow Extension 5 5   Wrist Extension 5 5   Digit Extension 5 5   Digit Flexion 5 5   APB 5 5   Tone 0 0   Lower       Right Left   Hip Flexion 5- 3   Knee Extension 5 5   Knee Flexion 5 5   Foot Dorsiflexion 4 4   Foot Plantar Flexion 4 4   EH 5 4   Toe Flexion 5 4   Tone 0 0           Grade Description   0 No increase in muscle tone   1 Slight increase in muscle tone, manifested by a catch and release or by minimal resistance at the end of the range of motion when the affected part(s) is moved in flexion or extension   1+ Slight increase in muscle tone, manifested by a catch, followed by minimal resistance throughout the remainder (less than half) of the ROM   2 More marked increase in muscle tone through most of the ROM, but affected part(s) easily moved   3 Considerable increase in muscle tone, passive movement difficult   4 Affected part(s) rigid in flexion or extension           SENSORY:     Light touch:  left hemisensory loss        MOTOR/CEREBELLAR:    Right Left   RRM 0 Normal 0 Normal "   ADRIEL 0 Normal 0 Normal   FTN 0 Normal 0 Normal   RRM 0 Normal 0 Normal   HKS 0 Normal 0 Normal           GAIT: Gait is   narrow-based and steady and the patient is  able to walk on heels, toes and in tandem without difficulty.    Romberg: Stable with eye(s) closed    RESULTS:  Monitoring labs:    Orders Only on 04/09/2019   Component Date Value Ref Range Status     Occult Blood Scn FIT 04/03/2019 Negative  NEG^Negative Final   Office Visit on 03/06/2019   Component Date Value Ref Range Status     Hemoglobin A1C 03/06/2019 6.8* 0 - 5.6 % Final     Creatinine Urine 03/06/2019 25  mg/dL Final     Albumin Urine mg/L 03/06/2019 <5  mg/L Final     Albumin Urine mg/g Cr 03/06/2019 Unable to calculate due to low value  0 - 17 mg/g Cr Final     TSH 03/06/2019 1.24  0.40 - 4.00 mU/L Final   ]      MRI brain:    MRI brain: None    ASSESSMENT/PLAN:  The patient is a 58-year-old gentleman with a past medical history of cancer that is post multiple surgeries and primary progressive multiple sclerosis who is presenting today as a follow-up.  Overall, the last year the patient is largely remained clinically stable.  If anything he is stronger on his physical exam today.  I once again spent a prolonged period of time reviewing with him the potential treatments of his medical condition.  He once again is not interested in really trying any other treatments that are currently available.  I do not necessarily disagree with his decision.  Most these medications would only have modest benefit for him.  I spent time reviewing symptomatic treatment of his fatigue.  I advised that Prozac is sometimes used as treatment for fatigue and multiple sclerosis.  This that the advantage of being in nonaddictive substance given his history of alcohol abuse.  However, the patient was not interested in this.  I advised the patient if he would change his mind, then he should call my office.  If the patient continues to do well, I will have him  follow-up in my office in 12 months.  Instructed him to call my office with any questions, concerns, issues or problems.    Follow-up in 1 year    I spent 25 minutes in this visit, with >50% direct patient time spent counseling about prognosis, treatment options, and coordination of care.    Josemanuel Zapata MD [facility]  Staff Neurologist   05/16/19       (Chart documentation was completed in part with Dragon voice-recognition software. Even though reviewed, some grammatical, spelling, and word errors may remain.)         Again, thank you for allowing me to participate in the care of your patient.        Sincerely,        Josemanuel Zapata MD

## 2019-08-01 ENCOUNTER — TRANSFERRED RECORDS (OUTPATIENT)
Dept: HEALTH INFORMATION MANAGEMENT | Facility: CLINIC | Age: 59
End: 2019-08-01

## 2019-08-10 DIAGNOSIS — Z13.6 CARDIOVASCULAR SCREENING; LDL GOAL LESS THAN 100: ICD-10-CM

## 2019-08-12 RX ORDER — SIMVASTATIN 80 MG
TABLET ORAL
Qty: 45 TABLET | Refills: 1 | Status: SHIPPED | OUTPATIENT
Start: 2019-08-12 | End: 2020-01-28

## 2019-08-12 NOTE — TELEPHONE ENCOUNTER
Prescription approved per [facility].  Bruce Chowdary, RN, BSN

## 2019-08-16 ENCOUNTER — TELEPHONE (OUTPATIENT)
Dept: FAMILY MEDICINE | Facility: CLINIC | Age: 59
End: 2019-08-16

## 2019-08-16 NOTE — TELEPHONE ENCOUNTER
Reason for call:  Form  Reason for Call:  Form, our goal is to have forms completed with 72 hours, however, some forms may require a visit or additional information.    Type of letter, form or note:  medical    Who is the form from?: [employer]    Where did the form come from: form was faxed in    What clinic location was the form placed at?: [facility] - [phone]    Where the form was placed: 's Box    What number is listed as a contact on the form?:  [phone]       Additional comments:  Fax to [phone]    For  Glucose test strips    created by Dianna Kirkland

## 2019-10-02 ENCOUNTER — ALLIED HEALTH/NURSE VISIT (OUTPATIENT)
Dept: FAMILY MEDICINE | Facility: CLINIC | Age: 59
End: 2019-10-02
Payer: MEDICARE

## 2019-10-02 DIAGNOSIS — Z23 NEED FOR PROPHYLACTIC VACCINATION AND INOCULATION AGAINST INFLUENZA: Primary | ICD-10-CM

## 2019-10-02 PROCEDURE — G0008 ADMIN INFLUENZA VIRUS VAC: HCPCS

## 2019-10-02 PROCEDURE — 90682 RIV4 VACC RECOMBINANT DNA IM: CPT

## 2019-10-02 PROCEDURE — 99207 ZZC NO CHARGE NURSE ONLY: CPT

## 2019-10-23 NOTE — PROGRESS NOTES
"SUBJECTIVE:   CC: Aden Vaz is an 59 year old male who presents for preventative health visit.     HPI  {Add if <65 person on Medicare  - Required Questions (Optional):096027}  {Outside tests to abstract? :934770}    {additional problems to add (Optional):246842}    Today's PHQ-2 Score:   PHQ-2 (  [employer]) 3/6/2019   Q1: Little interest or pleasure in doing things 0   Q2: Feeling down, depressed or hopeless 1   PHQ-2 Score 1   Q1: Little interest or pleasure in doing things -   Q2: Feeling down, depressed or hopeless -   PHQ-2 Score -       Abuse: Current or Past(Physical, Sexual or Emotional)- { :342164}  Do you feel safe in your environment? { :795139}    Social History     Tobacco Use     Smoking status: Former Smoker     Packs/day: 1.50     Years: 30.00     Pack years: 45.00     Types: Cigarettes     Last attempt to quit: 12/10/2002     Years since quittin.8     Smokeless tobacco: Never Used   Substance Use Topics     Alcohol use: No     Comment: Sober for 25 years     {Rooming Staff- Complete this question if Prescreen response is not shown below for today's visit. If you drink alcohol do you typically have >3 drinks per day or >7 drinks per week? (Optional):976336}    Alcohol Use 10/12/2018   Prescreen: >3 drinks/day or >7 drinks/week? Not Applicable   Prescreen: >3 drinks/day or >7 drinks/week? -   {add AUDIT responses (Optional) (A score of 7 for adult men is an indication of hazardous drinking; a score of 8 or more is an indication of an alcohol use disorder.  A score of 7 or more for adult women is an indication of hazardous drinking or an alchohol use disorder):984808}    Last PSA:   PSA   Date Value Ref Range Status   10/12/2018 0.64 0 - 4 ug/L Final     Comment:     Assay Method:  Chemiluminescence using Siemens Vista analyzer       Reviewed orders with patient. Reviewed health maintenance and updated orders accordingly - { :694062::\"Yes\"}  {Chronicprobdata (optional):411789}    Reviewed " "and updated as needed this visit by clinical staff         Reviewed and updated as needed this visit by Provider        {HISTORY OPTIONS (Optional):269740}    Review of Systems  {MALE ROS (Optional):415061::\"CONSTITUTIONAL: NEGATIVE for fever, chills, change in weight\",\"INTEGUMENTARY/SKIN: NEGATIVE for worrisome rashes, moles or lesions\",\"EYES: NEGATIVE for vision changes or irritation\",\"ENT: NEGATIVE for ear, mouth and throat problems\",\"RESP: NEGATIVE for significant cough or SOB\",\"CV: NEGATIVE for chest pain, palpitations or peripheral edema\",\"GI: NEGATIVE for nausea, abdominal pain, heartburn, or change in bowel habits\",\" male: negative for dysuria, hematuria, decreased urinary stream, erectile dysfunction, urethral discharge\",\"MUSCULOSKELETAL: NEGATIVE for significant arthralgias or myalgia\",\"NEURO: NEGATIVE for weakness, dizziness or paresthesias\",\"PSYCHIATRIC: NEGATIVE for changes in mood or affect\"}    OBJECTIVE:   There were no vitals taken for this visit.    Physical Exam  {Exam Choices (Optional):043982}    {Diagnostic Test Results (Optional):590837::\"Diagnostic Test Results:\",\"Labs reviewed in Epic\"}    ASSESSMENT/PLAN:   {Diag Picklist:252324}    COUNSELING:   {MALE COUNSELING MESSAGES:566855::\"Reviewed preventive health counseling, as reflected in patient instructions\"}    Estimated body mass index is 21.99 kg/m  as calculated from the following:    Height as of 3/6/19: 1.88 m (6' 2\").    Weight as of 5/16/19: 77.7 kg (171 lb 4.8 oz).     {Weight Management Plan (ACO) Complete if BMI is abnormal-  Ages 18-64  BMI >24.9.  Age 65+ with BMI <23 or >30 (Optional):796913}     reports that he quit smoking about 16 years ago. His smoking use included cigarettes. He has a 45.00 pack-year smoking history. He has never used smokeless tobacco.  {Tobacco Cessation -- Complete if patient is a smoker (Optional):967378}    Counseling Resources:  ATP IV Guidelines  Pooled Cohorts Equation Calculator  FRAX Risk " Assessment  ICSI Preventive Guidelines  Dietary Guidelines for Americans, 2010  USDA's MyPlate  ASA Prophylaxis  Lung CA Screening    Melita Painter MD  [facility]

## 2019-10-23 NOTE — PATIENT INSTRUCTIONS
Preventive Health Recommendations  Male Ages 50 - 64    Yearly exam:             See your health care provider every year in order to  o   Review health changes.   o   Discuss preventive care.    o   Review your medicines if your doctor has prescribed any.     Have a cholesterol test every 5 years, or more frequently if you are at risk for high cholesterol/heart disease.     Have a diabetes test (fasting glucose) every three years. If you are at risk for diabetes, you should have this test more often.     Have a colonoscopy at age 50, or have a yearly FIT test (stool test). These exams will check for colon cancer.      Talk with your health care provider about whether or not a prostate cancer screening test (PSA) is right for you.    You should be tested each year for STDs (sexually transmitted diseases), if you re at risk.     Shots: Get a flu shot each year. Get a tetanus shot every 10 years.     Nutrition:    Eat at least 5 servings of fruits and vegetables daily.     Eat whole-grain bread, whole-wheat pasta and brown rice instead of white grains and rice.     Get adequate Calcium and Vitamin D.     Lifestyle    Exercise for at least 150 minutes a week (30 minutes a day, 5 days a week). This will help you control your weight and prevent disease.     Limit alcohol to one drink per day.     No smoking.     Wear sunscreen to prevent skin cancer.     See your dentist every six months for an exam and cleaning.     See your eye doctor every 1 to 2 years.    Patient Education   Personalized Prevention Plan  You are due for the preventive services outlined below.  Your care team is available to assist you in scheduling these services.  If you have already completed any of these items, please share that information with your care team to update in your medical record.  Health Maintenance Due   Topic Date Due     HIV Screening  09/25/1974     Zoster (Shingles) Vaccine (1 of 2) 09/25/2009     Pneumococcal Vaccine (3 of 3  - PPSV23) 11/29/2016     A1C Lab  09/06/2019     Discuss Advance Care Planning  09/29/2019     Basic Metabolic Panel  10/12/2019     Comprehensive Metabolic Panel  10/12/2019     Cholesterol Lab  10/12/2019     Annual Wellness Visit  10/12/2019        Patient Education   Personalized Prevention Plan  You are due for the preventive services outlined below.  Your care team is available to assist you in scheduling these services.  If you have already completed any of these items, please share that information with your care team to update in your medical record.  Health Maintenance Due   Topic Date Due     HIV Screening  09/25/1974     Zoster (Shingles) Vaccine (1 of 2) 09/25/2009     Pneumococcal Vaccine (3 of 3 - PPSV23) 11/29/2016     A1C Lab  09/06/2019     Discuss Advance Care Planning  09/29/2019     Comprehensive Metabolic Panel  10/12/2019     Cholesterol Lab  10/12/2019     Annual Wellness Visit  10/12/2019

## 2019-10-29 ENCOUNTER — OFFICE VISIT (OUTPATIENT)
Dept: FAMILY MEDICINE | Facility: CLINIC | Age: 59
End: 2019-10-29
Payer: MEDICARE

## 2019-10-29 VITALS
OXYGEN SATURATION: 100 % | TEMPERATURE: 97.9 F | HEIGHT: 74 IN | RESPIRATION RATE: 18 BRPM | HEART RATE: 73 BPM | BODY MASS INDEX: 23 KG/M2 | DIASTOLIC BLOOD PRESSURE: 80 MMHG | WEIGHT: 179.2 LBS | SYSTOLIC BLOOD PRESSURE: 118 MMHG

## 2019-10-29 DIAGNOSIS — Z12.5 ENCOUNTER FOR SCREENING FOR MALIGNANT NEOPLASM OF PROSTATE: ICD-10-CM

## 2019-10-29 DIAGNOSIS — Z79.4 TYPE 2 DIABETES MELLITUS WITHOUT COMPLICATION, WITH LONG-TERM CURRENT USE OF INSULIN (H): ICD-10-CM

## 2019-10-29 DIAGNOSIS — M54.9 UPPER BACK PAIN: ICD-10-CM

## 2019-10-29 DIAGNOSIS — E78.5 HYPERLIPIDEMIA LDL GOAL <100: ICD-10-CM

## 2019-10-29 DIAGNOSIS — G35 MULTIPLE SCLEROSIS (H): ICD-10-CM

## 2019-10-29 DIAGNOSIS — E11.9 TYPE 2 DIABETES MELLITUS WITHOUT COMPLICATION, WITH LONG-TERM CURRENT USE OF INSULIN (H): ICD-10-CM

## 2019-10-29 DIAGNOSIS — Z00.00 ENCOUNTER FOR MEDICARE ANNUAL WELLNESS EXAM: Primary | ICD-10-CM

## 2019-10-29 LAB
ALBUMIN SERPL-MCNC: 4 G/DL (ref 3.4–5)
ALP SERPL-CCNC: 109 U/L (ref 40–150)
ALT SERPL W P-5'-P-CCNC: 48 U/L (ref 0–70)
ANION GAP SERPL CALCULATED.3IONS-SCNC: 7 MMOL/L (ref 3–14)
AST SERPL W P-5'-P-CCNC: 32 U/L (ref 0–45)
BILIRUB SERPL-MCNC: 0.5 MG/DL (ref 0.2–1.3)
BUN SERPL-MCNC: 13 MG/DL (ref 7–30)
CALCIUM SERPL-MCNC: 9.8 MG/DL (ref 8.5–10.1)
CHLORIDE SERPL-SCNC: 100 MMOL/L (ref 94–109)
CHOLEST SERPL-MCNC: 166 MG/DL
CO2 SERPL-SCNC: 29 MMOL/L (ref 20–32)
CREAT SERPL-MCNC: 0.74 MG/DL (ref 0.66–1.25)
GFR SERPL CREATININE-BSD FRML MDRD: >90 ML/MIN/{1.73_M2}
GLUCOSE SERPL-MCNC: 169 MG/DL (ref 70–99)
HBA1C MFR BLD: 6.9 % (ref 0–5.6)
HDLC SERPL-MCNC: 57 MG/DL
LDLC SERPL CALC-MCNC: 98 MG/DL
NONHDLC SERPL-MCNC: 109 MG/DL
POTASSIUM SERPL-SCNC: 4.6 MMOL/L (ref 3.4–5.3)
PROT SERPL-MCNC: 8.2 G/DL (ref 6.8–8.8)
PSA SERPL-ACNC: 3.14 UG/L (ref 0–4)
SODIUM SERPL-SCNC: 136 MMOL/L (ref 133–144)
TRIGL SERPL-MCNC: 55 MG/DL

## 2019-10-29 PROCEDURE — 99213 OFFICE O/P EST LOW 20 MIN: CPT | Mod: 25 | Performed by: FAMILY MEDICINE

## 2019-10-29 PROCEDURE — G0438 PPPS, INITIAL VISIT: HCPCS | Performed by: FAMILY MEDICINE

## 2019-10-29 PROCEDURE — 80053 COMPREHEN METABOLIC PANEL: CPT | Performed by: FAMILY MEDICINE

## 2019-10-29 PROCEDURE — 80061 LIPID PANEL: CPT | Performed by: FAMILY MEDICINE

## 2019-10-29 PROCEDURE — G0103 PSA SCREENING: HCPCS | Performed by: FAMILY MEDICINE

## 2019-10-29 PROCEDURE — 83036 HEMOGLOBIN GLYCOSYLATED A1C: CPT | Performed by: FAMILY MEDICINE

## 2019-10-29 PROCEDURE — 36415 COLL VENOUS BLD VENIPUNCTURE: CPT | Performed by: FAMILY MEDICINE

## 2019-10-29 ASSESSMENT — MIFFLIN-ST. JEOR: SCORE: 1692.6

## 2019-10-29 NOTE — PROGRESS NOTES
SUBJECTIVE:   Aden Vaz is a 59 year old male who presents for Preventive Visit.  Are you in the first 12 months of your Medicare coverage?  No    Healthy Habits:    In general, how would you rate your overall health?  Good    Frequency of exercise:  4-5 days/week    Duration of exercise:  30-45 minutes    Taking medications regularly:  Yes    Barriers to taking medications:  None    Medication side effects:  None    Current function: Patient has MS and lives alone.    Home Safety:  No safety concerns identified    Hearing Impairment:  No hearing concerns    In the past 6 months, have you been bothered by leaking of urine?  No    In general, how would you rate your overall mental or emotional health?  Good      PHQ-2 Total Score:    Do you feel safe in your environment? Yes    Do you have a Health Care Directive? Yes: Advance Directive has been received and scanned.    Fall risk  Fallen 2 or more times in the past year?: No  Any fall with injury in the past year?: No    Cognitive Screening   1) Repeat 3 items (Leader, Season, Table)    2) Clock draw: NORMAL  3) 3 item recall: Recalls 3 objects  Results: 3 items recalled: COGNITIVE IMPAIRMENT LESS LIKELY    Mini-CogTM Copyright ALIZA Rojas. Licensed by the author for use in [facility]; reprinted with permission ([email or web address]). All rights reserved.      Do you have sleep apnea, excessive snoring or daytime drowsiness?: no    Reviewed and updated as needed this visit by clinical staff  Tobacco  Allergies  Meds  Med Hx  Surg Hx  Fam Hx  Soc Hx        Reviewed and updated as needed this visit by Provider        Social History     Tobacco Use     Smoking status: Former Smoker     Packs/day: 1.50     Years: 30.00     Pack years: 45.00     Types: Cigarettes     Last attempt to quit: 12/10/2002     Years since quittin.8     Smokeless tobacco: Never Used   Substance Use Topics     Alcohol use: No     Comment: Sober for 31 years         Alcohol Use  "10/12/2018   Prescreen: >3 drinks/day or >7 drinks/week? Not Applicable   Prescreen: >3 drinks/day or >7 drinks/week? -     Discussed with patient that chronic medical issues and acute issues are not seen by insurance as part of a preventive exam and that may see an additional charge. Patient agrees to proceed with preventive visit and would like to discuss other issues.        Upper back pain  The patient states that he has had pain in his upper back for a long time. He notes that he has difficulty sleeping due to the pain. He states that he does physical therapy exercises for his MS, but it does not help his back pain. He notes trying to have good posture throughout the day.   He notes being told that he had arthritis in his upper back.     MS  The patient notes that his MS is doing well. He notes that he does not have any concerns at this point, as he is stable. He notes that if he \"over do it one day, he will feel fatigued the next\".     Diabetes   The patient states that his blood sugars tend to get elevated with the flu vaccine. He notes that his blood sugars are back to normal.   He notes utilizing 9 units of Lantus. He alters the Novolog, based on what he eats.   He notes that he may have ankle swelling in his left ankle at the end of the day.     Diet  The patient states that he eats nuts and Smart Balance products. He monitors his sodium intake as well.     Blood pressure  The patient states that he takes his Lisinopril as prescribed.     Heartburn   The patient notes that he had \"really bad\" heartburn last month. He notes that he switched his coffee brand, and the heartburn disappeared.     Life stressors   The patient states that he has been through a lot. He notes that he was diagnosed with MS and was told that he probably wouldn't make it to 59 years old. He states that he may have a mild form of seasonal disorder. To help this, he notes that will get out of his apartment when there is sun out.   He " "notes having a \"mind over matter\" viewpoint.     Current providers sharing in care for this patient include:   Patient Care Team:  Melita Painter MD as PCP - General (Family Practice)  Melita Painter MD as Assigned PCP  Payam Gage MD as MD (Orthopedics)  Isai Kathleen MD as MD (Neurology)    The following health maintenance items are reviewed in Epic and correct as of today:  Health Maintenance   Topic Date Due     HIV SCREENING  09/25/1975     ZOSTER IMMUNIZATION (1 of 2) 09/25/2009     PNEUMOCOCCAL IMMUNIZATION 19-64 HIGHEST RISK (3 of 3 - PPSV23) 11/29/2016     A1C  09/06/2019     ADVANCE CARE PLANNING  09/29/2019     BMP  10/12/2019     CMP  10/12/2019     LIPID  10/12/2019     MEDICARE ANNUAL WELLNESS VISIT  10/12/2019     MICROALBUMIN  03/06/2020     DIABETIC FOOT EXAM  03/06/2020     FIT  04/03/2020     EYE EXAM  08/01/2020     TSH W/FREE T4 REFLEX  03/06/2021     DTAP/TDAP/TD IMMUNIZATION (2 - Td) 03/13/2022     HEPATITIS C SCREENING  Completed     PHQ-2  Completed     INFLUENZA VACCINE  Completed     IPV IMMUNIZATION  Aged Out     MENINGITIS IMMUNIZATION  Aged Out     BP Readings from Last 3 Encounters:   10/29/19 118/80   05/16/19 134/68   03/06/19 124/70    Wt Readings from Last 3 Encounters:   10/29/19 81.3 kg (179 lb 3.2 oz)   05/16/19 77.7 kg (171 lb 4.8 oz)   03/06/19 82.1 kg (181 lb)                  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)     Chondrosarcoma (H)     Advanced directives, counseling/discussion     Type A blood, Rh positive     Esophageal reflux     Raynaud's disease without gangrene     Failed allograft of bone     Pelvis fracture (H)     Type 2 diabetes mellitus without complication, with " long-term current use of insulin (H)     Hyperlipidemia LDL goal <100     Mechanical complication of prosthetic hip implant, subsequent encounter     Past Surgical History:   Procedure Laterality Date     ARTHROPLASTY REVISION HIP Left 2015    Procedure: ARTHROPLASTY REVISION HIP;  Surgeon: Payam Gage MD;  Location: [facility]     BIOPSY       C HAND/FINGER SURGERY UNLISTED       C PELVIS/HIP JOINT SURGERY UNLISTED       COMBINED CYSTOSCOPY, INSERT STENT URETER(S) Left 2015    Procedure: COMBINED CYSTOSCOPY, INSERT STENT URETER(S);  Surgeon: Jass King MD;  Location: [facility] OR     OPTICAL TRACKING SYSTEM OPEN REDUCTION INTERNAL FIXATION PELVIS Left 2015    Procedure: OPTICAL TRACKING SYSTEM OPEN REDUCTION INTERNAL FIXATION PELVIS;  Surgeon: Payam Gage MD;  Location: [facility] OR     ORTHOPEDIC SURGERY       PAST SURGICAL HISTORY      several pelvic surgeries for osteosarcoma     RELEASE CARPAL TUNNEL  2012    Procedure: RELEASE CARPAL TUNNEL;  Right carpal tunnel release;  Surgeon: Mery Link MD;  Location: [facility] OR     SOFT TISSUE SURGERY         Social History     Tobacco Use     Smoking status: Former Smoker     Packs/day: 1.50     Years: 30.00     Pack years: 45.00     Types: Cigarettes     Last attempt to quit: 12/10/2002     Years since quittin.8     Smokeless tobacco: Never Used   Substance Use Topics     Alcohol use: No     Comment: Sober for 31 years     Family History   Problem Relation Age of Onset     Genitourinary Problems Mother         Liver disease     Breast Cancer Mother      Heart Disease Father      Cardiac Sudden Death Father      Cancer Sister      Prostate Cancer Other      Asthma No family hx of      C.A.D. No family hx of      Diabetes No family hx of      Hypertension No family hx of      Cerebrovascular Disease No family hx of      Cancer - colorectal No family hx of      Alcohol/Drug No family hx of      Allergies No family hx of      Alzheimer  Disease No family hx of      Anesthesia Reaction No family hx of      Blood Disease No family hx of      Arthritis No family hx of      Cardiovascular No family hx of      Circulatory No family hx of      Congenital Anomalies No family hx of      Connective Tissue Disorder No family hx of      Depression No family hx of      Endocrine Disease No family hx of      Eye Disorder No family hx of      Genetic Disorder No family hx of      Gastrointestinal Disease No family hx of      Gynecology No family hx of      Lipids No family hx of      Hearing Loss No family hx of      Family History Negative No family hx of      Respiratory No family hx of      Psychotic Disorder No family hx of      Osteoporosis No family hx of      Obesity No family hx of      Neurologic Disorder No family hx of      Musculoskeletal Disorder No family hx of      Thyroid Disease No family hx of          Current Outpatient Medications   Medication Sig Dispense Refill     alcohol swab prep pads Use to swab area of injection/jorge as directed. 100 each 3     aspirin 81 MG tablet Take  by mouth daily.  3     B-D U/F insulin pen needle USE FOUR TIMES DAILY WITH NOVOLOG AND LANTUS PENS AS DIRECTED 300 each 11     blood glucose (DARIN CONTOUR) test strip Use to test blood sugar 3 times daily or as directed. 400 strip 3     CALCIUM + D 600-200 MG-UNIT PO TABS 1 Tablet daily       insulin aspart (NOVOLOG PEN) 100 UNIT/ML pen 6 units with breakfast, 4 units at lunch and 10 units at dinner 30 mL 5     insulin glargine (LANTUS SOLOSTAR PEN) 100 UNIT/ML pen Inject 10 Units Subcutaneous At Bedtime 9 mL 3     lisinopril (PRINIVIL/ZESTRIL) 10 MG tablet Take 1 tablet (10 mg) by mouth daily 90 tablet 3     ORDER FOR DME Crutch, forearm 1 Units 0     simvastatin (ZOCOR) 80 MG tablet TAKE ONE-HALF TABLET BY MOUTH DAILY 45 tablet 1     VITAMIN D3 2000 UNIT PO CAPS 1 CAPSULE DAILY       Allergies   Allergen Reactions     No Known Drug Allergies      Recent Labs   Lab  "Test 03/06/19  1508 10/12/18  1545 04/24/18  1351 10/11/17  1427 04/11/17  1624 10/04/16  0955   A1C 6.8* 6.7* 6.6* 6.7* 6.9* 6.7*   LDL  --  85  --  98  --  98   HDL  --  48  --  48  --  49   TRIG  --  111  --  91  --  64   ALT  --  43 44  --  50 36   CR  --  0.86 0.95  --  0.96 0.98   GFRESTIMATED  --  >90 81  --  81 78   GFRESTBLACK  --  >90 >90  --  >90   GFR Calc   >90   GFR Calc     POTASSIUM  --  4.7 4.1  --  4.3 4.9   TSH 1.24  --   --   --  1.60 1.72      Review of Systems  CONSTITUTIONAL: NEGATIVE for fever, chills, change in weight  INTEGUMENTARY/SKIN: NEGATIVE for worrisome rashes, moles or lesions  EYES: NEGATIVE for vision changes or irritation  ENT/MOUTH: NEGATIVE for ear, mouth and throat problems  RESP: NEGATIVE for significant cough or SOB  BREAST: NEGATIVE for masses, tenderness or discharge  CV: NEGATIVE for chest pain, palpitations or peripheral edema  GI: NEGATIVE for nausea, abdominal pain, heartburn, or change in bowel habits  : NEGATIVE for frequency, dysuria, or hematuria  MUSCULOSKELETAL: NEGATIVE for significant arthralgias or myalgia; POSITIVE for upper back pain  NEURO: NEGATIVE for weakness, dizziness or paresthesias; POSITIVE for MS  ENDOCRINE: NEGATIVE for temperature intolerance, skin/hair changes  HEME: NEGATIVE for bleeding problems  PSYCHIATRIC: NEGATIVE for changes in mood or affect    This document serves as a record of the services and decisions personally performed and made by Melita Painter MD. It was created on her behalf by Ninoska Newby, a trained medical scribe. The creation of this document is based the provider's statements to the medical scribe.    Ninoska Newby October 29, 2019 11:11 AM  OBJECTIVE:   Pulse 73   Temp 97.9  F (36.6  C) (Oral)   Resp 18   Ht 1.88 m (6' 2\")   Wt 81.3 kg (179 lb 3.2 oz)   SpO2 100%   BMI 23.01 kg/m   Estimated body mass index is 23.01 kg/m  as calculated from the following:    Height as of this " "encounter: 1.88 m (6' 2\").    Weight as of this encounter: 81.3 kg (179 lb 3.2 oz).  Physical Exam  GENERAL: healthy, alert and no distress  EYES: Eyes grossly normal to inspection, PERRL and conjunctivae and sclerae normal  HENT: ear canals and TM's normal, nose and mouth without ulcers or lesions  NECK: no adenopathy, no asymmetry, masses, or scars and thyroid normal to palpation  RESP: lungs clear to auscultation - no rales, rhonchi or wheezes  CV: regular rate and rhythm, normal S1 S2, no S3 or S4, no murmur, click or rub, no peripheral edema and peripheral pulses strong  ABDOMEN: soft, nontender, no hepatosplenomegaly, no masses and bowel sounds normal  MS: no gross musculoskeletal defects noted, no edema  SKIN: no suspicious lesions or rashes  NEURO: Normal strength and tone, mentation intact and speech normal  PSYCH: mentation appears normal, affect normal/bright    Diagnostic Test Results:  No results found for this or any previous visit (from the past 24 hour(s)).    ASSESSMENT / PLAN:   1. Encounter for Medicare annual wellness exam  Medicare annual wellness exam was administered today.   Reviewed patient's mental health as he stated that he went through a lot with his diagnosis of MS. Patient reports having a \"mind over matter\" viewpoint- this helps his outlook. He will watch funny movies, go for walks and do other actions to help brighten his mood. Patient will contact me if interested in pursuing other options, such as counseling, in future.   See counseling messages below.     2. Type 2 diabetes mellitus without complication, with long-term current use of insulin (H)  Patient reports blood sugars are within range- were noted to be slightly elevated after receiving flu vaccine. Has since subsided.   Doing well. Well controlled. Tolerating medication.  No change in plan.   Labs have been ordered.   Awaiting results.   Dose adjustment as needed.   - HEMOGLOBIN A1C  - COMPREHENSIVE METABOLIC PANEL  - " "OFFICE/OUTPT VISIT,EST,LEVL III    3. Multiple sclerosis (H)  Patient reports that if he \"over exerts himself one day, he will be fatigued the next\".  Patient reports doing well otherwise. Voiced no concerns today and is stable. Continues having rare concerns.   Patient will continue care with Neurology as planned. Patient reports having plans to consult a new Neurologist, as Dr. Zapata is leaving [facility].   - OFFICE/OUTPT VISIT,EST,LEVL III    4. Hyperlipidemia LDL goal <100  Doing well. Well controlled. Tolerating medication.  No change in plan.   Labs have been ordered.   Will notify with results.   Dose adjustment as needed.   - Lipid panel reflex to direct LDL Fasting  - OFFICE/OUTPT VISIT,EST,LEVL III    5. Upper back pain  Patient reports having upper back pain for \"a long time\".  He reports having difficulty sleeping due to pain.   Patient reports doing physical therapy exercises at home- upper back pain is not 100% relieved.   He reports \"trying to have good posture throughout day\" to help back pain.   Advised patient that maintaining good posture can help back pain. Continuing physical therapy exercises can help relieve pain. Patient can take Tylenol if needed. Heat and Lidocaine patches can help relieve pain as well. Patient can try Aspercreme at night if needed.   If symptoms worsen or persist, patient will contact me.   - OFFICE/OUTPT VISIT,EST,LEVL III    6. Encounter for screening for malignant neoplasm of prostate   Labs have been ordered.   Awaiting results.   - PSA, screen    End of Life Planning:  Patient currently has an advanced directive: Yes.  Practitioner is supportive of decision.    COUNSELING:  Special attention given to:       Regular exercise       Healthy diet/nutrition       Vision screening       Hearing screening       Dental care       Bladder control       Fall risk prevention       Prostate cancer screening    Estimated body mass index is 23.01 kg/m  as calculated from " "the following:    Height as of this encounter: 1.88 m (6' 2\").    Weight as of this encounter: 81.3 kg (179 lb 3.2 oz).         reports that he quit smoking about 16 years ago. His smoking use included cigarettes. He has a 45.00 pack-year smoking history. He has never used smokeless tobacco.      Appropriate preventive services were discussed with this patient, including applicable screening as appropriate for cardiovascular disease, diabetes, osteopenia/osteoporosis, and glaucoma.  As appropriate for age/gender, discussed screening for colorectal cancer, prostate cancer, breast cancer, and cervical cancer. Checklist reviewing preventive services available has been given to the patient.    Reviewed patients plan of care and provided an AVS. The Basic Care Plan (routine screening as documented in Health Maintenance) for [place] meets the Care Plan requirement. This Care Plan has been established and reviewed with the Patient.    Counseling Resources:  ATP IV Guidelines  Pooled Cohorts Equation Calculator  Breast Cancer Risk Calculator  FRAX Risk Assessment  ICSI Preventive Guidelines  Dietary Guidelines for Americans, 2010  USDA's MyPlate  ASA Prophylaxis  Lung CA Screening    The information in this document, created by the medical scribe for me, accurately reflects the services I personally performed and the decisions made by me. I have reviewed and approved this document for accuracy prior to leaving the patient care area.    Melita Painter MD  [facility]    Identified Health Risks:  "

## 2019-11-05 ENCOUNTER — HEALTH MAINTENANCE LETTER (OUTPATIENT)
Age: 59
End: 2019-11-05

## 2019-11-13 DIAGNOSIS — Z79.4 TYPE 2 DIABETES MELLITUS WITHOUT COMPLICATION, WITH LONG-TERM CURRENT USE OF INSULIN (H): ICD-10-CM

## 2019-11-13 DIAGNOSIS — E11.9 TYPE 2 DIABETES MELLITUS WITHOUT COMPLICATION, WITH LONG-TERM CURRENT USE OF INSULIN (H): ICD-10-CM

## 2019-11-13 RX ORDER — LISINOPRIL 10 MG/1
10 TABLET ORAL DAILY
Qty: 90 TABLET | Refills: 1 | Status: SHIPPED | OUTPATIENT
Start: 2019-11-13 | End: 2020-05-04

## 2019-11-13 NOTE — TELEPHONE ENCOUNTER
Pending Prescriptions:                       Disp   Refills    lisinopril (PRINIVIL/ZESTRIL) 10 MG [id number] tab*1            Sig: Take 1 tablet (10 mg) by mouth daily    Prescription approved per [facility] Refill Protocol.    Lisa Atkins, RN, BSN

## 2019-11-17 DIAGNOSIS — E11.9 TYPE 2 DIABETES MELLITUS WITHOUT COMPLICATION, WITH LONG-TERM CURRENT USE OF INSULIN (H): ICD-10-CM

## 2019-11-17 DIAGNOSIS — Z79.4 TYPE 2 DIABETES MELLITUS WITHOUT COMPLICATION, WITH LONG-TERM CURRENT USE OF INSULIN (H): ICD-10-CM

## 2019-11-19 RX ORDER — FLURBIPROFEN SODIUM 0.3 MG/ML
SOLUTION/ DROPS OPHTHALMIC
Qty: 300 EACH | Refills: 1 | Status: SHIPPED | OUTPATIENT
Start: 2019-11-19 | End: 2020-04-13

## 2019-11-19 NOTE — TELEPHONE ENCOUNTER
Pending Prescriptions:                       Disp   Refills    B-D U/F insulin pen needle [Pharmacy Med *300 ea*1            Sig: USE FOUR TIMES DAILY WITH NOVOLOG AND LANTUS PENS           AS DIRECTED    Prescription approved per [facility] Refill Protocol.    Lisa Atkins, RN, BSN

## 2019-11-25 ENCOUNTER — TELEPHONE (OUTPATIENT)
Dept: ORTHOPEDICS | Facility: CLINIC | Age: 59
End: 2019-11-25

## 2019-12-10 DIAGNOSIS — E11.9 TYPE 2 DIABETES MELLITUS WITHOUT COMPLICATION, WITH LONG-TERM CURRENT USE OF INSULIN (H): ICD-10-CM

## 2019-12-10 DIAGNOSIS — Z79.4 TYPE 2 DIABETES MELLITUS WITHOUT COMPLICATION, WITH LONG-TERM CURRENT USE OF INSULIN (H): ICD-10-CM

## 2019-12-11 NOTE — TELEPHONE ENCOUNTER
Pending Prescriptions:                       Disp   Refills    insulin glargine (LANTUS SOLOSTAR) 100 UN*9 mL   2            Sig: Inject 10 Units Subcutaneous At Bedtime    Prescription approved per [facility] Refill Protocol.    Lisa Atkins, RN, BSN

## 2019-12-18 ENCOUNTER — TELEPHONE (OUTPATIENT)
Dept: FAMILY MEDICINE | Facility: CLINIC | Age: 59
End: 2019-12-18

## 2019-12-18 NOTE — TELEPHONE ENCOUNTER
Can use my same day slot on Friday.  If he is having a lot of pain, see if he wants to see another provider sooner

## 2019-12-18 NOTE — PROGRESS NOTES
Subjective     Aden Vaz is a 59 year old male who presents to clinic today for the following health issues:    History of Present Illness        He eats 0-1 servings of fruits and vegetables daily.He consumes 0 sweetened beverage(s) daily.  He is taking medications regularly.     Concern - left ear pain  Onset: 1 month    Description: Pressure/pain on left side and below ear.    Intensity: mild    Progression of Symptoms:  Improving but not gone    Accompanying Signs & Symptoms:  no    Previous history of similar problem:   Years ago    Precipitating factors:   Worsened by: nothing     Alleviating factors:  Improved by: nothing    Therapies Tried and outcome: none  The patient states that he has ear pressure since Thanksgiving. He notes yawning can cause the pressure to become painful. The patient notes that the most discomfort is around his left jaw.   He notes that he used to wear headphones a lot. He declines fevers or chills.   The patient has had this once before in his right ear.     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)     Chondrosarcoma (H)     Advanced directives, counseling/discussion     Type A blood, Rh positive     Esophageal reflux     Raynaud's disease without gangrene     Failed allograft of bone     Pelvis fracture (H)     Type 2 diabetes mellitus without complication, with long-term current use of insulin (H)     Hyperlipidemia LDL goal <100     Mechanical complication of prosthetic hip implant, subsequent encounter     Past Surgical History:   Procedure Laterality Date     ARTHROPLASTY REVISION HIP Left 9/25/2015    Procedure: ARTHROPLASTY REVISION HIP;  Surgeon: Payam Gage MD;  [facility]: [facility]     BIOPSY        C HAND/FINGER SURGERY UNLISTED       C PELVIS/HIP JOINT SURGERY UNLISTED       COMBINED CYSTOSCOPY, INSERT STENT URETER(S) Left 2015    Procedure: COMBINED CYSTOSCOPY, INSERT STENT URETER(S);  Surgeon: Jass King MD;  [facility]     OPTICAL TRACKING SYSTEM OPEN REDUCTION INTERNAL FIXATION PELVIS Left 2015    Procedure: OPTICAL TRACKING SYSTEM OPEN REDUCTION INTERNAL FIXATION PELVIS;  Surgeon: Payam Gage MD;  [facility]     ORTHOPEDIC SURGERY       PAST SURGICAL HISTORY      several pelvic surgeries for osteosarcoma     RELEASE CARPAL TUNNEL  2012    Procedure: RELEASE CARPAL TUNNEL;  Right carpal tunnel release;  Surgeon: Mery Link MD;  Location: [facility]     SOFT TISSUE SURGERY         Social History     Tobacco Use     Smoking status: Former Smoker     Packs/day: 1.50     Years: 30.00     Pack years: 45.00     Types: Cigarettes     Last attempt to quit: 12/10/2002     Years since quittin.0     Smokeless tobacco: Never Used   Substance Use Topics     Alcohol use: No     Comment: Sober for 31 years     Family History   Problem Relation Age of Onset     Genitourinary Problems Mother         Liver disease     Breast Cancer Mother      Heart Disease Father      Cardiac Sudden Death Father      Cancer Sister      Prostate Cancer Other      Asthma No family hx of      C.A.D. No family hx of      Diabetes No family hx of      Hypertension No family hx of      Cerebrovascular Disease No family hx of      Cancer - colorectal No family hx of      Alcohol/Drug No family hx of      Allergies No family hx of      Alzheimer Disease No family hx of      Anesthesia Reaction No family hx of      Blood Disease No family hx of      Arthritis No family hx of      Cardiovascular No family hx of      Circulatory No family hx of      Congenital Anomalies No family hx of      Connective Tissue Disorder No family hx of      Depression No family hx of      Endocrine Disease No  family hx of      Eye Disorder No family hx of      Genetic Disorder No family hx of      Gastrointestinal Disease No family hx of      Gynecology No family hx of      Lipids No family hx of      Hearing Loss No family hx of      Family History Negative No family hx of      Respiratory No family hx of      Psychotic Disorder No family hx of      Osteoporosis No family hx of      Obesity No family hx of      Neurologic Disorder No family hx of      Musculoskeletal Disorder No family hx of      Thyroid Disease No family hx of          Current Outpatient Medications   Medication Sig Dispense Refill     alcohol swab prep pads Use to swab area of injection/jorge as directed. 100 each 3     aspirin 81 MG tablet Take  by mouth daily.  3     B-D U/F insulin pen needle USE FOUR TIMES DAILY WITH NOVOLOG AND LANTUS PENS AS DIRECTED 300 each 1     blood glucose (DARIN CONTOUR) test strip Use to test blood sugar 3 times daily or as directed. 400 strip 3     CALCIUM + D 600-200 MG-UNIT PO TABS 1 Tablet daily       insulin aspart (NOVOLOG PEN) 100 UNIT/ML pen 6 units with breakfast, 4 units at lunch and 10 units at dinner 30 mL 5     insulin glargine (LANTUS SOLOSTAR) 100 UNIT/ML pen Inject 10 Units Subcutaneous At Bedtime 9 mL 2     lisinopril (PRINIVIL/ZESTRIL) 10 MG tablet Take 1 tablet (10 mg) by mouth daily 90 tablet 1     ORDER FOR DME Crutch, forearm 1 Units 0     simvastatin (ZOCOR) 80 MG tablet TAKE ONE-HALF TABLET BY MOUTH DAILY 45 tablet 1     VITAMIN D3 2000 UNIT PO CAPS 1 CAPSULE DAILY       Allergies   Allergen Reactions     No Known Drug Allergies      Recent Labs   Lab Test 10/29/19  1151 03/06/19  1508 10/12/18  1545 04/24/18  1351 10/11/17  1427 04/11/17  1624   A1C 6.9* 6.8* 6.7* 6.6* 6.7* 6.9*   LDL 98  --  85  --  98  --    HDL 57  --  48  --  48  --    TRIG 55  --  111  --  91  --    ALT 48  --  43 44  --  50   CR 0.74  --  0.86 0.95  --  0.96   GFRESTIMATED >90  --  >90 81  --  81   GFRESTBLACK >90  --  >90  ">90  --  >90   GFR Calc     POTASSIUM 4.6  --  4.7 4.1  --  4.3   TSH  --  1.24  --   --   --  1.60      BP Readings from Last 3 Encounters:   12/20/19 118/70   10/29/19 118/80   05/16/19 134/68    Wt Readings from Last 3 Encounters:   12/20/19 79.8 kg (176 lb)   10/29/19 81.3 kg (179 lb 3.2 oz)   05/16/19 77.7 kg (171 lb 4.8 oz)        Reviewed and updated as needed this visit by Provider  Tobacco  Allergies  Meds  Problems  Med Hx  Surg Hx  Fam Hx         Review of Systems   ROS COMP: CONSTITUTIONAL: NEGATIVE for fever, chills, change in weight  ENT/MOUTH: NEGATIVE for mouth and throat problems; POSITIVE for left ear pressure   RESP: NEGATIVE for significant cough or SOB  CV: NEGATIVE for chest pain, palpitations or peripheral edema    This document serves as a record of the services and decisions personally performed and made by Melita Painter MD. It was created on her behalf by Ninoska Newby, a trained medical scribe. The creation of this document is based the provider's statements to the medical scribe.    Nionska Newby December 20, 2019 2:11 PM      Objective    /70   Pulse 72   Temp 98  F (36.7  C)   Resp 14   Ht 1.88 m (6' 2\")   Wt 79.8 kg (176 lb)   BMI 22.60 kg/m    Body mass index is 22.6 kg/m .  Physical Exam   GENERAL: healthy, alert and no distress  HENT: ear canals and TM's normal, mouth without ulcers or lesions, impacted cerumen left ear covering entire ear drum  NECK: no adenopathy, no asymmetry, masses, or scars and thyroid normal to palpation    Diagnostic Test Results:  No results found for this or any previous visit (from the past 24 hour(s)).        Assessment & Plan     1. Ear pressure, left  Patient reports having ongoing ear pressure since Thanksgiving- yawning can cause pressure to be painful.   Patient reports pressure can be located near left jaw.   He reports wearing headphone frequently.   He declines fevers or chills.   See exam findings noted " above.     2. Impacted cerumen of left ear  See #1.  Ear irrigation completed today in clinic. Noted that there is a small amount of soft ear wax after ear irrigation.   Advised patient that he can try OTC Debrox to help ear wax at home. Can also try mixing half warm water with peroxide and apply in ear.      Follow up- Come back in 4 months for Diabetes recheck.     The information in this document, created by the medical scribe for me, accurately reflects the services I personally performed and the decisions made by me. I have reviewed and approved this document for accuracy prior to leaving the patient care area.    Melita Painter MD  [facility]

## 2019-12-18 NOTE — TELEPHONE ENCOUNTER
Pt informed. Scheduled with SF on 12/20 - declined seeing another Provider sooner, since there is not pain per se, just pressure.

## 2019-12-18 NOTE — TELEPHONE ENCOUNTER
Reason for call:  Symptom  Reason for call:  Patient reporting a symptom    Symptom or request: Left ear pain    Duration (how long have symptoms been present): Since Thanksgiving    Have you been treated for this before? No    Additional comments: This is not a lot of pain, but pressure occasionally when wakes and sometimes throughout the day. Please call to see if he can be worked in on Friday, if not willing to either wait, or see another provider.     Phone Number patient can be reached at:  Cell number on file:    Telephone Information:   Mobile [phone]       Best Time:  Any    Can we leave a detailed message on this number:  YES    Call taken on 12/18/2019 at 10:37 AM by Concepción Mota

## 2019-12-20 ENCOUNTER — OFFICE VISIT (OUTPATIENT)
Dept: FAMILY MEDICINE | Facility: CLINIC | Age: 59
End: 2019-12-20
Payer: MEDICARE

## 2019-12-20 VITALS
TEMPERATURE: 98 F | RESPIRATION RATE: 14 BRPM | SYSTOLIC BLOOD PRESSURE: 118 MMHG | WEIGHT: 176 LBS | HEIGHT: 74 IN | DIASTOLIC BLOOD PRESSURE: 70 MMHG | BODY MASS INDEX: 22.59 KG/M2 | HEART RATE: 72 BPM

## 2019-12-20 DIAGNOSIS — H61.22 IMPACTED CERUMEN OF LEFT EAR: ICD-10-CM

## 2019-12-20 DIAGNOSIS — H93.8X2 EAR PRESSURE, LEFT: Primary | ICD-10-CM

## 2019-12-20 PROCEDURE — 99212 OFFICE O/P EST SF 10 MIN: CPT | Mod: 25 | Performed by: FAMILY MEDICINE

## 2019-12-20 PROCEDURE — 69209 REMOVE IMPACTED EAR WAX UNI: CPT | Mod: LT | Performed by: FAMILY MEDICINE

## 2019-12-20 ASSESSMENT — MIFFLIN-ST. JEOR: SCORE: 1678.08

## 2019-12-20 ASSESSMENT — PAIN SCALES - GENERAL: PAINLEVEL: MILD PAIN (2)

## 2019-12-20 NOTE — PATIENT INSTRUCTIONS
Consider Debrox OTC to help with ear wax buildup.     You can also try mixing half warm water and peroxide to put in your ear.     Come back next week to have irrigation done again if needed.

## 2020-01-27 ENCOUNTER — TELEPHONE (OUTPATIENT)
Dept: NEUROLOGY | Facility: CLINIC | Age: 60
End: 2020-01-27

## 2020-01-28 DIAGNOSIS — Z13.6 CARDIOVASCULAR SCREENING; LDL GOAL LESS THAN 100: ICD-10-CM

## 2020-01-28 RX ORDER — SIMVASTATIN 80 MG
TABLET ORAL
Qty: 45 TABLET | Refills: 1 | Status: SHIPPED | OUTPATIENT
Start: 2020-01-28 | End: 2020-08-04

## 2020-01-28 NOTE — TELEPHONE ENCOUNTER
"Zocor  Last Written Prescription Date:  08/12/2019  Last Fill Quantity: 45,  # refills: 1   Last office visit: 12/20/2019 with prescribing provider:  Madina   Future Office Visit:  None  Prescription approved per [facility] Refill Protocol.    Requested Prescriptions   Pending Prescriptions Disp Refills     simvastatin (ZOCOR) 80 MG tablet [Pharmacy Med Name: SIMVASTATIN 80MG TABLETS] 45 tablet 1     Sig: TAKE ONE-HALF TABLET BY MOUTH DAILY       Statins Protocol Passed - 1/28/2020  1:55 PM        Passed - LDL on file in past 12 months     Recent Labs   Lab Test 10/29/19  1151   LDL 98           Passed - No abnormal creatine kinase in past 12 months     No lab results found.         Passed - Recent (12 mo) or future (30 days) visit within the authorizing provider's specialty     Patient has had an office visit with the authorizing provider or a provider within the authorizing providers department within the previous 12 mos or has a future within next 30 days. See \"Patient Info\" tab in inbasket, or \"Choose Columns\" in Meds & Orders section of the refill encounter.          Passed - Medication is active on med list        Passed - Patient is age 18 or older      Tori Ferrara RN   "

## 2020-04-13 DIAGNOSIS — Z79.4 TYPE 2 DIABETES MELLITUS WITHOUT COMPLICATION, WITH LONG-TERM CURRENT USE OF INSULIN (H): ICD-10-CM

## 2020-04-13 DIAGNOSIS — E11.9 TYPE 2 DIABETES MELLITUS WITHOUT COMPLICATION, WITH LONG-TERM CURRENT USE OF INSULIN (H): ICD-10-CM

## 2020-04-13 RX ORDER — FLURBIPROFEN SODIUM 0.3 MG/ML
SOLUTION/ DROPS OPHTHALMIC
Qty: 300 EACH | Refills: 0 | Status: SHIPPED | OUTPATIENT
Start: 2020-04-13 | End: 2020-06-24

## 2020-04-13 NOTE — TELEPHONE ENCOUNTER
Pending Prescriptions:                       Disp   Refills    B-D U/F insulin pen needle [Pharmacy Med *300 ea*0            Sig: USE FOUR TIMES DAILY WITH NOVOLOG AND LANTUS PENS           AS DIRECTED    Prescription approved per [facility] Refill Protocol.    Lisa Atkins, MSN, RN

## 2020-05-04 DIAGNOSIS — Z79.4 TYPE 2 DIABETES MELLITUS WITHOUT COMPLICATION, WITH LONG-TERM CURRENT USE OF INSULIN (H): ICD-10-CM

## 2020-05-04 DIAGNOSIS — E11.9 TYPE 2 DIABETES MELLITUS WITHOUT COMPLICATION, WITH LONG-TERM CURRENT USE OF INSULIN (H): ICD-10-CM

## 2020-05-04 RX ORDER — LISINOPRIL 10 MG/1
TABLET ORAL
Qty: 90 TABLET | Refills: 1 | Status: SHIPPED | OUTPATIENT
Start: 2020-05-04 | End: 2020-11-02

## 2020-05-04 NOTE — TELEPHONE ENCOUNTER
Prescription approved per [facility] Refill Protocol.    Katina Ladd, RN, BSN

## 2020-05-21 DIAGNOSIS — Z79.4 TYPE 2 DIABETES MELLITUS WITHOUT COMPLICATION, WITH LONG-TERM CURRENT USE OF INSULIN (H): ICD-10-CM

## 2020-05-21 DIAGNOSIS — E11.9 TYPE 2 DIABETES MELLITUS WITHOUT COMPLICATION, WITH LONG-TERM CURRENT USE OF INSULIN (H): ICD-10-CM

## 2020-05-21 RX ORDER — CARVEDILOL 25 MG/1
TABLET, FILM COATED ORAL
Qty: 400 STRIP | Refills: 0 | Status: SHIPPED | OUTPATIENT
Start: 2020-05-21 | End: 2020-12-14

## 2020-05-21 RX ORDER — INSULIN ASPART 100 [IU]/ML
INJECTION, SOLUTION INTRAVENOUS; SUBCUTANEOUS
Qty: 30 ML | Refills: 5 | Status: SHIPPED | OUTPATIENT
Start: 2020-05-21 | End: 2021-08-06

## 2020-05-21 NOTE — TELEPHONE ENCOUNTER
Pending Prescriptions:                       Disp   Refills    CONTOUR TEST test strip [Pharmacy Med Name*400 st*3        Sig: USE TO TEST BLOOD SUGAR THREE TIMES DAILY OR AS           DIRECTED    Insulin Aspart FlexPen 100 UNIT/ML SOPN [P*30 mL  5        Sig: INEJCT 6 UNITS WITH BREAKFAST, 4 UNITS AT LUNCH AND           10 UNITS AT DINNER      Support Staff:   Please call patient to schedule a virtual visit. (Video, Phone or E-visit) due for Diabetes check  Then ask if patient will need a refill of the above medication before the visit.   Please reflag for RN and route to [place] to give a 30 day jazmyn to get them to their visit or to remove the medication and to close the encounter.    Thank you!    Bruce Chowdary, RN, BSN

## 2020-05-22 ENCOUNTER — VIRTUAL VISIT (OUTPATIENT)
Dept: FAMILY MEDICINE | Facility: OTHER | Age: 60
End: 2020-05-22
Payer: MEDICARE

## 2020-05-22 DIAGNOSIS — Z79.4 TYPE 2 DIABETES MELLITUS WITHOUT COMPLICATION, WITH LONG-TERM CURRENT USE OF INSULIN (H): Primary | ICD-10-CM

## 2020-05-22 DIAGNOSIS — Z12.11 SPECIAL SCREENING FOR MALIGNANT NEOPLASMS, COLON: ICD-10-CM

## 2020-05-22 DIAGNOSIS — E11.9 TYPE 2 DIABETES MELLITUS WITHOUT COMPLICATION, WITH LONG-TERM CURRENT USE OF INSULIN (H): Primary | ICD-10-CM

## 2020-05-22 PROCEDURE — 99441 ZZC PHYSICIAN TELEPHONE EVALUATION 5-10 MIN GOVT: CPT | Performed by: FAMILY MEDICINE

## 2020-05-22 NOTE — PROGRESS NOTES
"Aden Vaz is a 59 year old male who is being evaluated via a billable telephone visit.      The patient has been notified of following:     \"This telephone visit will be conducted via a call between you and your physician/provider. We have found that certain health care needs can be provided without the need for a physical exam.  This service lets us provide the care you need with a short phone conversation.  If a prescription is necessary we can send it directly to your pharmacy.  If lab work is needed we can place an order for that and you can then stop by our lab to have the test done at a later time.    Telephone visits are billed at different rates depending on your insurance coverage. During this emergency period, for some insurers they may be billed the same as an in-person visit.  Please reach out to your insurance provider with any questions.    If during the course of the call the physician/provider feels a telephone visit is not appropriate, you will not be charged for this service.\"    Patient has given verbal consent for Telephone visit?  Yes    What phone number would you like to be contacted at? [phone]    How would you like to obtain your AVS? Yasminhart    Quentin     Aden Vaz is a 59 year old male who presents via phone visit today for the following health issues:    HPI     Diabetes Follow-up    How often are you checking your blood sugar? Two times daily  Blood sugar testing frequency justification:  Patient modifying lifestyle changes (diet, exercise) with blood sugars  What time of day are you checking your blood sugars (select all that apply)?  before breakfast and dinner  Have you had any blood sugars above 200?  No  Have you had any blood sugars below 70?  No    What symptoms do you notice when your blood sugar is low?  Other: difficulty concentrating     What concerns do you have today about your diabetes? None     Do you have any of these symptoms? (Select all that " apply)  No numbness or tingling in feet.  No redness, sores or blisters on feet.  No complaints of excessive thirst.  No reports of blurry vision.  No significant changes to weight.    Managed with good control. No new symptoms. No lows. Takes 9 units of lantus at night rather than 10. Updated in chart. Needs this refilled. Other refills were done yesterday.    Hyperlipidemia Follow-Up      Are you regularly taking any medication or supplement to lower your cholesterol?   Yes- zocor    Are you having muscle aches or other side effects that you think could be caused by your cholesterol lowering medication?  No    Hypertension Follow-up      Do you check your blood pressure regularly outside of the clinic? No     Are you following a low salt diet? Yes    Are your blood pressures ever more than 140 on the top number (systolic) OR more   than 90 on the bottom number (diastolic), for example 140/90? 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)     Chondrosarcoma (H)     Advanced directives, counseling/discussion     Type A blood, Rh positive     Esophageal reflux     Raynaud's disease without gangrene     Failed allograft of bone     Pelvis fracture (H)     Type 2 diabetes mellitus without complication, with long-term current use of insulin (H)     Hyperlipidemia LDL goal <100     Mechanical complication of prosthetic hip implant, subsequent encounter     Past Surgical History:   Procedure Laterality Date      ARTHROPLASTY REVISION HIP Left 2015    Procedure: ARTHROPLASTY REVISION HIP;  Surgeon: Payam Gage MD;  [facility]     BIOPSY  2003     C HAND/FINGER SURGERY UNLISTED       C PELVIS/HIP JOINT SURGERY UNLISTED       COMBINED CYSTOSCOPY, INSERT STENT URETER(S) Left 2015    Procedure: COMBINED CYSTOSCOPY, INSERT STENT URETER(S);  Surgeon: Jass King MD;  [facility]     OPTICAL TRACKING SYSTEM OPEN REDUCTION INTERNAL FIXATION PELVIS Left 2015    Procedure: OPTICAL TRACKING SYSTEM OPEN REDUCTION INTERNAL FIXATION PELVIS;  Surgeon: Payam Gage MD;  [facility]     ORTHOPEDIC SURGERY       PAST SURGICAL HISTORY      several pelvic surgeries for osteosarcoma     RELEASE CARPAL TUNNEL  2012    Procedure: RELEASE CARPAL TUNNEL;  Right carpal tunnel release;  Surgeon: Mery Link MD;  Location: [facility]     SOFT TISSUE SURGERY         Social History     Tobacco Use     Smoking status: Former Smoker     Packs/day: 1.50     Years: 30.00     Pack years: 45.00     Types: Cigarettes     Last attempt to quit: 12/10/2002     Years since quittin.4     Smokeless tobacco: Never Used   Substance Use Topics     Alcohol use: No     Comment: Sober for 31 years     Family History   Problem Relation Age of Onset     Genitourinary Problems Mother         Liver disease     Breast Cancer Mother      Heart Disease Father      Cardiac Sudden Death Father      Cancer Sister      Prostate Cancer Other      Asthma No family hx of      C.A.D. No family hx of      Diabetes No family hx of      Hypertension No family hx of      Cerebrovascular Disease No family hx of      Cancer - colorectal No family hx of      Alcohol/Drug No family hx of      Allergies No family hx of      Alzheimer Disease No family hx of      Anesthesia Reaction No family hx of      Blood Disease No family hx of      Arthritis No family hx of      Cardiovascular No family hx of      Circulatory No family hx of       Congenital Anomalies No family hx of      Connective Tissue Disorder No family hx of      Depression No family hx of      Endocrine Disease No family hx of      Eye Disorder No family hx of      Genetic Disorder No family hx of      Gastrointestinal Disease No family hx of      Gynecology No family hx of      Lipids No family hx of      Hearing Loss No family hx of      Family History Negative No family hx of      Respiratory No family hx of      Psychotic Disorder No family hx of      Osteoporosis No family hx of      Obesity No family hx of      Neurologic Disorder No family hx of      Musculoskeletal Disorder No family hx of      Thyroid Disease No family hx of          Current Outpatient Medications   Medication Sig Dispense Refill     alcohol swab prep pads Use to swab area of injection/jorge as directed. 100 each 3     aspirin 81 MG tablet Take  by mouth daily.  3     B-D U/F insulin pen needle USE FOUR TIMES DAILY WITH NOVOLOG AND LANTUS PENS AS DIRECTED 300 each 0     CALCIUM + D 600-200 MG-UNIT PO TABS 1 Tablet daily       CONTOUR TEST test strip USE TO TEST BLOOD SUGAR THREE TIMES DAILY OR AS DIRECTED 400 strip 0     Insulin Aspart FlexPen 100 UNIT/ML SOPN INEJCT 6 UNITS WITH BREAKFAST, 4 UNITS AT LUNCH AND 10 UNITS AT DINNER 30 mL 5     insulin glargine (LANTUS SOLOSTAR) 100 UNIT/ML pen Inject 9 Units Subcutaneous At Bedtime 9 mL 2     lisinopril (ZESTRIL) 10 MG tablet TAKE 1 TABLET(10 MG) BY MOUTH DAILY 90 tablet 1     ORDER FOR DME Crutch, forearm 1 Units 0     simvastatin (ZOCOR) 80 MG tablet TAKE ONE-HALF TABLET BY MOUTH DAILY 45 tablet 1     VITAMIN D3 2000 UNIT PO CAPS 1 CAPSULE DAILY       Allergies   Allergen Reactions     No Known Drug Allergies      Reviewed and updated as needed this visit by Provider  Tobacco  Allergies  Meds  Problems  Med Hx  Surg Hx  Fam Hx         Review of Systems   Constitutional, HEENT, lymph, msk, derm, cardiovascular, pulmonary, gi and gu systems are negative,  except as otherwise noted.       Objective   Reported vitals:  There were no vitals taken for this visit.   healthy, alert and no distress  PSYCH: Alert and oriented times 3; coherent speech, normal   rate and volume, able to articulate logical thoughts, able   to abstract reason, no tangential thoughts, no hallucinations   or delusions  His affect is normal  RESP: No cough, no audible wheezing, able to talk in full sentences  Remainder of exam unable to be completed due to telephone visits    Diagnostic Test Results:  Labs pending see orders.        Assessment/Plan:  1. Type 2 diabetes mellitus without complication, with long-term current use of insulin (H): Under good control. Managed with insulin. Due for A1c. Last done in October of last year. Due for eye exam this summer and patient will schedule. Will do foot exam at next visit. Lantus refilled. Continue current regiment.  - Hemoglobin A1c; Future  - insulin glargine (LANTUS SOLOSTAR) 100 UNIT/ML pen; Inject 9 Units Subcutaneous At Bedtime  Dispense: 9 mL; Refill: 2    2. Special screening for malignant neoplasms, colon: Due for screening.  - Fecal colorectal cancer screen (FIT); Future      Return in about 1 week (around 5/29/2020) for Lab Work.      Phone call duration:  7 minutes    Nate Grijalva MD  [facility]    This chart is completed utilizing dictation software; typos and/or incorrect word substitutions may unintentionally occur.

## 2020-05-22 NOTE — PATIENT INSTRUCTIONS
Patient Education     Long-Term Complications of Diabetes    Diabetes can cause health problems over time. These are called complications. They are more likely to happen if your blood sugar is often too high. Over time, high blood sugar can damage blood vessels in your body. It is important to keep your blood sugar in your target range. This can help prevent or delay complications from diabetes.  Possible complications  Diabetes can cause:    Eye problems, such as damage to the blood vessels in the eyes, pressure in the eye, and clouding of the eye s lens. In time, eye problems can lead to bleeding and blindness.     Tooth and gum problems. These can cause loss of teeth and bone.    Blood vessel disease, leading to circulation problems, heart attack, or stroke. Or you may need a limb removed because of poor blood flow to the skin.    Problems with sexual function. Men may have erectile dysfunction. Women may have sexual discomfort.     Kidney disease, leading to kidney failure. You may need dialysis or a kidney transplant.     Nerve problems, causing pain or loss of feeling in your feet and other parts of your body. This may lead to loss of a limb.     High blood pressure. This health problem puts strain on your heart and blood vessels.    Serious infections. These can possibly lead to loss of toes, feet, or limbs.  How to prevent complications  You can prevent these serious problems by managing your blood sugar, blood pressure, and cholesterol. This can help you feel better and stay healthy. You can control diabetes by tracking your blood sugar. You can also eat healthy and exercise to prevent weight gain. If you smoke, stop all tobacco products. It will greatly improve your overall health. And take medicine if directed by your healthcare provider.  Date Last Reviewed: 8/1/2018 2000-2019 [employer]. [address] [place] [address], [place] [address]. All rights reserved. This information is not intended  as a substitute for professional medical care. Always follow your healthcare professional's instructions.

## 2020-05-26 ENCOUNTER — TELEPHONE (OUTPATIENT)
Dept: FAMILY MEDICINE | Facility: CLINIC | Age: 60
End: 2020-05-26

## 2020-05-26 DIAGNOSIS — E11.9 TYPE 2 DIABETES MELLITUS WITHOUT COMPLICATION, WITH LONG-TERM CURRENT USE OF INSULIN (H): ICD-10-CM

## 2020-05-26 DIAGNOSIS — Z79.4 TYPE 2 DIABETES MELLITUS WITHOUT COMPLICATION, WITH LONG-TERM CURRENT USE OF INSULIN (H): ICD-10-CM

## 2020-05-26 LAB — HBA1C MFR BLD: 6.9 % (ref 0–5.6)

## 2020-05-26 PROCEDURE — 36415 COLL VENOUS BLD VENIPUNCTURE: CPT | Performed by: FAMILY MEDICINE

## 2020-05-26 PROCEDURE — 83036 HEMOGLOBIN GLYCOSYLATED A1C: CPT | Performed by: FAMILY MEDICINE

## 2020-05-26 NOTE — TELEPHONE ENCOUNTER
Reason for call:  Form  Reason for Call:  Form, our goal is to have forms completed with 72 hours, however, some forms may require a visit or additional information.    Type of letter, form or note:  medical    Who is the form from?:  [facility] - diabetic order    Where did the form come from: form was faxed in    What clinic location was the form placed at?: [facility] - [phone]    Where the form was placed: 's Box    What number is listed as a contact on the form?:  [phone]       Additional comments:  Fax to [phone]    created by Dianna Kirkland

## 2020-05-27 NOTE — RESULT ENCOUNTER NOTE
Bashir,  Your recent studies show a stable hemoglobin A1c.  Please let me know if you have any questions or concerns and follow up as discussed in clinic.    Sincerely.  Dr. KARYN Jackman MD, FAAFP  Family Medicine Physician  [facility]  [address] [place] [address]

## 2020-06-04 ENCOUNTER — TELEPHONE (OUTPATIENT)
Dept: FAMILY MEDICINE | Facility: CLINIC | Age: 60
End: 2020-06-04

## 2020-06-04 NOTE — TELEPHONE ENCOUNTER
Reason for call:  Form  Reason for Call:  Form, our goal is to have forms completed with 72 hours, however, some forms may require a visit or additional information.    Type of letter, form or note:  Medical    Who is the form from?: [facility]    Where did the form come from: form was faxed in    What clinic location was the form placed at?: [facility] - [phone]    Where the form was placed: 's in box     What number is listed as a contact on the form?: none       Additional comments: Fax back to [phone]    Call taken on 6/4/2020 at 3:23 PM by Parminder Bradley

## 2020-06-15 ENCOUNTER — TELEPHONE (OUTPATIENT)
Dept: FAMILY MEDICINE | Facility: CLINIC | Age: 60
End: 2020-06-15

## 2020-06-23 DIAGNOSIS — E11.9 TYPE 2 DIABETES MELLITUS WITHOUT COMPLICATION, WITH LONG-TERM CURRENT USE OF INSULIN (H): ICD-10-CM

## 2020-06-23 DIAGNOSIS — Z79.4 TYPE 2 DIABETES MELLITUS WITHOUT COMPLICATION, WITH LONG-TERM CURRENT USE OF INSULIN (H): ICD-10-CM

## 2020-06-24 RX ORDER — FLURBIPROFEN SODIUM 0.3 MG/ML
SOLUTION/ DROPS OPHTHALMIC
Qty: 300 EACH | Refills: 1 | Status: SHIPPED | OUTPATIENT
Start: 2020-06-24 | End: 2020-11-20

## 2020-06-24 NOTE — TELEPHONE ENCOUNTER
Pending Prescriptions:                       Disp   Refills    B-D U/F insulin pen needle [Pharmacy Med *300 ea*0            Sig: USE FOUR TIMES DAILY WITH NOVOLOG AND LANTUS PENS           AS DIRECTED    Prescription approved per [facility] Refill Protocol.    Lisa Atkins, MSN, RN

## 2020-07-17 ENCOUNTER — TELEPHONE (OUTPATIENT)
Dept: FAMILY MEDICINE | Facility: CLINIC | Age: 60
End: 2020-07-17

## 2020-07-17 NOTE — TELEPHONE ENCOUNTER
Our goal is to have forms completed with 72 hours, however some forms may require a visit or additional information.    Who is the form from?: [facility] (if other please explain)  Where the form came from: form was faxed in  What clinic location was the form placed at?: [facility]  Where the form was placed: placed in TC inbox     What number is listed as a contact on the form?: n/a  Fax number to return form to:  [phone]        Call taken on 7/17/2020 at 1:03 PM by Hemalatha Alexander

## 2020-08-03 DIAGNOSIS — Z13.6 CARDIOVASCULAR SCREENING; LDL GOAL LESS THAN 100: ICD-10-CM

## 2020-08-04 RX ORDER — SIMVASTATIN 80 MG
TABLET ORAL
Qty: 45 TABLET | Refills: 0 | Status: SHIPPED | OUTPATIENT
Start: 2020-08-04 | End: 2020-11-03

## 2020-08-04 NOTE — TELEPHONE ENCOUNTER
Pending Prescriptions:                       Disp   Refills    simvastatin (ZOCOR) 80 MG tablet [Pharmac*45 tab*0            Sig: TAKE 1/2 TABLET BY MOUTH DAILY    Prescription approved per [facility] Refill Protocol.    Lisa Atkins, MSN, RN

## 2020-08-06 ENCOUNTER — TRANSFERRED RECORDS (OUTPATIENT)
Dept: HEALTH INFORMATION MANAGEMENT | Facility: CLINIC | Age: 60
End: 2020-08-06

## 2020-09-28 NOTE — PROGRESS NOTES
"Subjective     Aden Vaz is a 60 year old male who presents to clinic today for the following health issues:    History of Present Illness        Diabetes:   He presents for follow up of diabetes.  He is checking home blood glucose two times daily. He checks blood glucose before meals.  Blood glucose is sometimes over 200 and sometimes over 70. He is aware of hypoglycemia symptoms including shakiness and confusion. He has no concerns regarding his diabetes at this time.  He is not experiencing numbness or burning in feet, excessive thirst, blurry vision, weight changes or redness, sores or blisters on feet.         He eats 0-1 servings of fruits and vegetables daily.He consumes 0 sweetened beverage(s) daily.He exercises with enough effort to increase his heart rate 20 to 29 minutes per day.  He exercises with enough effort to increase his heart rate 7 days per week.   He is taking medications regularly.     Reports that he is doing ok. \"Life hasn't changed a whole lot\".   Wearing a mask and using .     Reports his sugars are doing well. No concerns. Well controlled     Hyperlipidemia  No concerns. Tolerating medication. No issues.     Multiple Sclerosis  No issues at this time. No concerns. Has some residual left sided symptoms for years. Maybe slight worsening of left foot with tingling.           Review of Systems   CONSTITUTIONAL: NEGATIVE for fever, chills, change in weight  INTEGUMENTARY/SKIN: NEGATIVE for worrisome rashes, moles or lesions  EYES: NEGATIVE for vision changes or irritation  ENT/MOUTH: NEGATIVE for ear, mouth and throat problems  RESP: NEGATIVE for significant cough or SOB  CV: NEGATIVE for chest pain, palpitations or peripheral edema  GI: NEGATIVE for nausea, abdominal pain, heartburn, or change in bowel habits  : NEGATIVE for frequency, dysuria, or hematuria  MUSCULOSKELETAL: NEGATIVE for significant arthralgias or myalgia  NEURO: as above.   PSYCHIATRIC: NEGATIVE for changes " in mood or affect      Objective    BP (!) 142/84   Pulse 74   Temp 97.7  F (36.5  C) (Temporal)   Resp 16   Wt 81.4 kg (179 lb 6.4 oz)   SpO2 99%   BMI 23.03 kg/m    Body mass index is 23.03 kg/m .  Physical Exam   GENERAL: healthy, alert and no distress  NECK: no adenopathy, no asymmetry, masses, or scars and thyroid normal to palpation  RESP: lungs clear to auscultation - no rales, rhonchi or wheezes  CV: regular rate and rhythm, normal S1 S2, no S3 or S4, no murmur, click or rub, no peripheral edema and peripheral pulses strong  MS: no gross musculoskeletal defects noted, no edema  Diabetic foot exam: normal DP and PT pulses            Assessment & Plan     Type 2 diabetes mellitus without complication, with long-term current use of insulin (H)  Due for labs today. He has no concerns.   Awaiting results.   Adjust medications as needed.   Will notify of results.   blood pressure elevated. Recommend recheck in a couple weeks.   If still elevated, will increase lisinopril to 20 mg daily.   - Albumin Random Urine Quantitative with Creat Ratio  - FOOT EXAM  - Comprehensive metabolic panel  - Hemoglobin A1c    Need for prophylactic vaccination and inoculation against influenza  Recommend. Done.   - INFLUENZA QUAD, RECOMBINANT, P-FREE (RIV4) (FLUBLOCK) [82177]    Multiple sclerosis (H)  Patient has no new concerns.  He has been stable for a several years.   Continue with neurology as planned.     Hyperlipidemia LDL goal <100  Awaiting results. Dose adjustment as needed.    - Lipid panel reflex to direct LDL Fasting    Special screening for malignant neoplasms, colon  Due. Orders placed.   - Fecal colorectal cancer screen (FIT); Future         Return in about 6 months (around 3/30/2021) for Diabetes recheck.    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  Due to these concerns he has difficulty with prolonged ambulation and requires assistance with the manual wheelchair at times for mobility.

## 2020-09-30 ENCOUNTER — OFFICE VISIT (OUTPATIENT)
Dept: FAMILY MEDICINE | Facility: CLINIC | Age: 60
End: 2020-09-30
Payer: MEDICARE

## 2020-09-30 VITALS
SYSTOLIC BLOOD PRESSURE: 142 MMHG | TEMPERATURE: 97.7 F | RESPIRATION RATE: 16 BRPM | OXYGEN SATURATION: 99 % | WEIGHT: 179.4 LBS | HEART RATE: 74 BPM | DIASTOLIC BLOOD PRESSURE: 84 MMHG | BODY MASS INDEX: 23.03 KG/M2

## 2020-09-30 DIAGNOSIS — E11.9 TYPE 2 DIABETES MELLITUS WITHOUT COMPLICATION, WITH LONG-TERM CURRENT USE OF INSULIN (H): Primary | ICD-10-CM

## 2020-09-30 DIAGNOSIS — T84.098D MECHANICAL COMPLICATION OF PROSTHETIC HIP IMPLANT, SUBSEQUENT ENCOUNTER: ICD-10-CM

## 2020-09-30 DIAGNOSIS — Z96.649 MECHANICAL COMPLICATION OF PROSTHETIC HIP IMPLANT, SUBSEQUENT ENCOUNTER: ICD-10-CM

## 2020-09-30 DIAGNOSIS — Z23 NEED FOR PROPHYLACTIC VACCINATION AND INOCULATION AGAINST INFLUENZA: ICD-10-CM

## 2020-09-30 DIAGNOSIS — E78.5 HYPERLIPIDEMIA LDL GOAL <100: ICD-10-CM

## 2020-09-30 DIAGNOSIS — Z12.11 SPECIAL SCREENING FOR MALIGNANT NEOPLASMS, COLON: ICD-10-CM

## 2020-09-30 DIAGNOSIS — Z79.4 TYPE 2 DIABETES MELLITUS WITHOUT COMPLICATION, WITH LONG-TERM CURRENT USE OF INSULIN (H): Primary | ICD-10-CM

## 2020-09-30 DIAGNOSIS — C41.9 CHONDROSARCOMA (H): ICD-10-CM

## 2020-09-30 DIAGNOSIS — G35 MULTIPLE SCLEROSIS (H): ICD-10-CM

## 2020-09-30 LAB
ALBUMIN SERPL-MCNC: 3.9 G/DL (ref 3.4–5)
ALP SERPL-CCNC: 97 U/L (ref 40–150)
ALT SERPL W P-5'-P-CCNC: 47 U/L (ref 0–70)
ANION GAP SERPL CALCULATED.3IONS-SCNC: 4 MMOL/L (ref 3–14)
AST SERPL W P-5'-P-CCNC: 33 U/L (ref 0–45)
BILIRUB SERPL-MCNC: 0.4 MG/DL (ref 0.2–1.3)
BUN SERPL-MCNC: 13 MG/DL (ref 7–30)
CALCIUM SERPL-MCNC: 9.1 MG/DL (ref 8.5–10.1)
CHLORIDE SERPL-SCNC: 102 MMOL/L (ref 94–109)
CHOLEST SERPL-MCNC: 173 MG/DL
CO2 SERPL-SCNC: 30 MMOL/L (ref 20–32)
CREAT SERPL-MCNC: 1 MG/DL (ref 0.66–1.25)
CREAT UR-MCNC: 18 MG/DL
GFR SERPL CREATININE-BSD FRML MDRD: 81 ML/MIN/{1.73_M2}
GLUCOSE SERPL-MCNC: 241 MG/DL (ref 70–99)
HBA1C MFR BLD: 6.9 % (ref 0–5.6)
HDLC SERPL-MCNC: 61 MG/DL
LDLC SERPL CALC-MCNC: 96 MG/DL
MICROALBUMIN UR-MCNC: <5 MG/L
MICROALBUMIN/CREAT UR: NORMAL MG/G CR (ref 0–17)
NONHDLC SERPL-MCNC: 112 MG/DL
POTASSIUM SERPL-SCNC: 4.5 MMOL/L (ref 3.4–5.3)
PROT SERPL-MCNC: 7.9 G/DL (ref 6.8–8.8)
SODIUM SERPL-SCNC: 136 MMOL/L (ref 133–144)
TRIGL SERPL-MCNC: 82 MG/DL

## 2020-09-30 PROCEDURE — 82043 UR ALBUMIN QUANTITATIVE: CPT | Performed by: FAMILY MEDICINE

## 2020-09-30 PROCEDURE — 80061 LIPID PANEL: CPT | Performed by: FAMILY MEDICINE

## 2020-09-30 PROCEDURE — G0008 ADMIN INFLUENZA VIRUS VAC: HCPCS | Performed by: FAMILY MEDICINE

## 2020-09-30 PROCEDURE — 80053 COMPREHEN METABOLIC PANEL: CPT | Performed by: FAMILY MEDICINE

## 2020-09-30 PROCEDURE — 83036 HEMOGLOBIN GLYCOSYLATED A1C: CPT | Performed by: FAMILY MEDICINE

## 2020-09-30 PROCEDURE — 90682 RIV4 VACC RECOMBINANT DNA IM: CPT | Performed by: FAMILY MEDICINE

## 2020-09-30 PROCEDURE — 99207 C FOOT EXAM  NO CHARGE: CPT | Performed by: FAMILY MEDICINE

## 2020-09-30 PROCEDURE — 36415 COLL VENOUS BLD VENIPUNCTURE: CPT | Performed by: FAMILY MEDICINE

## 2020-09-30 PROCEDURE — 99214 OFFICE O/P EST MOD 30 MIN: CPT | Mod: 25 | Performed by: FAMILY MEDICINE

## 2020-09-30 NOTE — PATIENT INSTRUCTIONS
Can check blood pressure with the nurse in a couple weeks. Consider medication if still high.

## 2020-10-01 DIAGNOSIS — Z12.11 SPECIAL SCREENING FOR MALIGNANT NEOPLASMS, COLON: ICD-10-CM

## 2020-10-06 LAB — HEMOCCULT STL QL IA: NEGATIVE

## 2020-10-21 ENCOUNTER — ALLIED HEALTH/NURSE VISIT (OUTPATIENT)
Dept: FAMILY MEDICINE | Facility: CLINIC | Age: 60
End: 2020-10-21
Payer: MEDICARE

## 2020-10-21 VITALS — HEART RATE: 88 BPM | DIASTOLIC BLOOD PRESSURE: 84 MMHG | SYSTOLIC BLOOD PRESSURE: 124 MMHG

## 2020-10-21 DIAGNOSIS — Z01.30 BP CHECK: Primary | ICD-10-CM

## 2020-10-21 PROCEDURE — 99207 PR NO CHARGE NURSE ONLY: CPT

## 2020-10-21 NOTE — PROGRESS NOTES
Aden Vaz is a 60 year old patient who comes in today for a Blood Pressure check.  Initial BP:  /84   Pulse 88      88  Disposition: follow-up as previously indicated by provider and results routed to provider

## 2020-10-27 ENCOUNTER — TELEPHONE (OUTPATIENT)
Dept: FAMILY MEDICINE | Facility: CLINIC | Age: 60
End: 2020-10-27

## 2020-10-27 NOTE — TELEPHONE ENCOUNTER
Our goal is to have forms completed with 72 hours, however some forms may require a visit or additional information.    Who is the form from?: [employer] (if other please explain)  Where the form came from: form was faxed in  What clinic location was the form placed at?: [facility]  Where the form was placed: placed in TC inbox     What number is listed as a contact on the form?: [phone]  Fax number to return form to:  [phone]      Call taken on 10/27/2020 at 1:42 PM by Hemalatha Alexander

## 2020-11-02 DIAGNOSIS — E11.9 TYPE 2 DIABETES MELLITUS WITHOUT COMPLICATION, WITH LONG-TERM CURRENT USE OF INSULIN (H): ICD-10-CM

## 2020-11-02 DIAGNOSIS — Z79.4 TYPE 2 DIABETES MELLITUS WITHOUT COMPLICATION, WITH LONG-TERM CURRENT USE OF INSULIN (H): ICD-10-CM

## 2020-11-02 DIAGNOSIS — Z13.6 CARDIOVASCULAR SCREENING; LDL GOAL LESS THAN 100: ICD-10-CM

## 2020-11-03 DIAGNOSIS — Z13.6 CARDIOVASCULAR SCREENING; LDL GOAL LESS THAN 100: ICD-10-CM

## 2020-11-03 RX ORDER — LISINOPRIL 10 MG/1
10 TABLET ORAL DAILY
Qty: 90 TABLET | Refills: 1 | Status: SHIPPED | OUTPATIENT
Start: 2020-11-03 | End: 2021-04-26

## 2020-11-03 RX ORDER — SIMVASTATIN 80 MG
TABLET ORAL
Qty: 45 TABLET | Refills: 1 | Status: SHIPPED | OUTPATIENT
Start: 2020-11-03 | End: 2021-04-26

## 2020-11-03 RX ORDER — LISINOPRIL 10 MG/1
TABLET ORAL
Qty: 90 TABLET | Refills: 1 | OUTPATIENT
Start: 2020-11-03

## 2020-11-03 NOTE — TELEPHONE ENCOUNTER
Prescription approved per [facility] Refill Protocol.  Diane Cervantes RN

## 2020-11-03 NOTE — TELEPHONE ENCOUNTER
Prescription approved per [facility] Refill Protocol.  Diane Cervantes RN

## 2020-11-04 RX ORDER — SIMVASTATIN 80 MG
TABLET ORAL
Qty: 45 TABLET | Refills: 1 | OUTPATIENT
Start: 2020-11-04

## 2020-11-20 DIAGNOSIS — Z79.4 TYPE 2 DIABETES MELLITUS WITHOUT COMPLICATION, WITH LONG-TERM CURRENT USE OF INSULIN (H): ICD-10-CM

## 2020-11-20 DIAGNOSIS — E11.9 TYPE 2 DIABETES MELLITUS WITHOUT COMPLICATION, WITH LONG-TERM CURRENT USE OF INSULIN (H): ICD-10-CM

## 2020-11-20 RX ORDER — FLURBIPROFEN SODIUM 0.3 MG/ML
SOLUTION/ DROPS OPHTHALMIC
Qty: 300 EACH | Refills: 0 | Status: SHIPPED | OUTPATIENT
Start: 2020-11-20 | End: 2021-02-09

## 2020-11-20 NOTE — TELEPHONE ENCOUNTER
Prescription approved per [facility] Refill Protocol.  .krh

## 2020-11-23 ENCOUNTER — TELEPHONE (OUTPATIENT)
Dept: FAMILY MEDICINE | Facility: CLINIC | Age: 60
End: 2020-11-23

## 2020-11-23 NOTE — TELEPHONE ENCOUNTER
Our goal is to have forms completed with 72 hours, however some forms may require a visit or additional information.    Who is the form from?: [employer] (if other please explain)  Where the form came from: form was faxed in  What clinic location was the form placed at?: [facility]  Where the form was placed: placed in TC inbox     What number is listed as a contact on the form?: [phone]  Fax number to return form to:  [phone]        Call taken on 11/23/2020 at 4:57 PM by Hemalatha Alexander

## 2020-11-24 ENCOUNTER — MEDICAL CORRESPONDENCE (OUTPATIENT)
Dept: HEALTH INFORMATION MANAGEMENT | Facility: CLINIC | Age: 60
End: 2020-11-24

## 2020-11-24 NOTE — TELEPHONE ENCOUNTER
Faxed as requested to [facility] [employer].\Taniya Kauffman MA on 11/24/2020 at 1:04 PM

## 2020-12-11 DIAGNOSIS — E11.9 TYPE 2 DIABETES MELLITUS WITHOUT COMPLICATION, WITH LONG-TERM CURRENT USE OF INSULIN (H): ICD-10-CM

## 2020-12-11 DIAGNOSIS — Z79.4 TYPE 2 DIABETES MELLITUS WITHOUT COMPLICATION, WITH LONG-TERM CURRENT USE OF INSULIN (H): ICD-10-CM

## 2020-12-14 RX ORDER — CARVEDILOL 25 MG/1
TABLET, FILM COATED ORAL
Qty: 400 STRIP | Refills: 0 | Status: SHIPPED | OUTPATIENT
Start: 2020-12-14 | End: 2021-06-11

## 2020-12-14 NOTE — TELEPHONE ENCOUNTER
Prescription approved per [facility] Refill Protocol.  Tyra Hand RN  December 14, 2020

## 2021-01-15 ENCOUNTER — HEALTH MAINTENANCE LETTER (OUTPATIENT)
Age: 61
End: 2021-01-15

## 2021-02-06 DIAGNOSIS — Z79.4 TYPE 2 DIABETES MELLITUS WITHOUT COMPLICATION, WITH LONG-TERM CURRENT USE OF INSULIN (H): ICD-10-CM

## 2021-02-06 DIAGNOSIS — E11.9 TYPE 2 DIABETES MELLITUS WITHOUT COMPLICATION, WITH LONG-TERM CURRENT USE OF INSULIN (H): ICD-10-CM

## 2021-02-09 RX ORDER — FLURBIPROFEN SODIUM 0.3 MG/ML
SOLUTION/ DROPS OPHTHALMIC
Qty: 300 EACH | Refills: 0 | Status: SHIPPED | OUTPATIENT
Start: 2021-02-09 | End: 2021-04-26

## 2021-03-24 ENCOUNTER — IMMUNIZATION (OUTPATIENT)
Dept: PEDIATRICS | Facility: CLINIC | Age: 61
End: 2021-03-24
Payer: MEDICARE

## 2021-03-24 PROCEDURE — 91300 PR COVID VAC PFIZER DIL RECON 30 MCG/0.3 ML IM: CPT

## 2021-03-24 PROCEDURE — 0001A PR COVID VAC PFIZER DIL RECON 30 MCG/0.3 ML IM: CPT

## 2021-04-04 ENCOUNTER — HEALTH MAINTENANCE LETTER (OUTPATIENT)
Age: 61
End: 2021-04-04

## 2021-04-08 DIAGNOSIS — Z79.4 TYPE 2 DIABETES MELLITUS WITHOUT COMPLICATION, WITH LONG-TERM CURRENT USE OF INSULIN (H): ICD-10-CM

## 2021-04-08 DIAGNOSIS — E11.9 TYPE 2 DIABETES MELLITUS WITHOUT COMPLICATION, WITH LONG-TERM CURRENT USE OF INSULIN (H): ICD-10-CM

## 2021-04-09 NOTE — TELEPHONE ENCOUNTER
Pending Prescriptions:                       Disp   Refills    insulin glargine (LANTUS SOLOSTAR) 100 UN*15 mL  0            Sig: Inject 9 Units Subcutaneous At Bedtime    Medication is being filled for 1 time jazmyn refill only due to:  Patient is due for diabetes follow up    Please call and help schedule.  Thank you!    Lucero Lyles RN on 4/9/2021 at 3:51 PM

## 2021-04-14 ENCOUNTER — OFFICE VISIT (OUTPATIENT)
Dept: PEDIATRICS | Facility: CLINIC | Age: 61
End: 2021-04-14
Attending: INTERNAL MEDICINE
Payer: MEDICARE

## 2021-04-14 PROCEDURE — 91300 PR COVID VAC PFIZER DIL RECON 30 MCG/0.3 ML IM: CPT

## 2021-04-14 PROCEDURE — 0002A PR COVID VAC PFIZER DIL RECON 30 MCG/0.3 ML IM: CPT

## 2021-04-23 DIAGNOSIS — Z13.6 CARDIOVASCULAR SCREENING; LDL GOAL LESS THAN 100: ICD-10-CM

## 2021-04-23 DIAGNOSIS — Z79.4 TYPE 2 DIABETES MELLITUS WITHOUT COMPLICATION, WITH LONG-TERM CURRENT USE OF INSULIN (H): ICD-10-CM

## 2021-04-23 DIAGNOSIS — E11.9 TYPE 2 DIABETES MELLITUS WITHOUT COMPLICATION, WITH LONG-TERM CURRENT USE OF INSULIN (H): ICD-10-CM

## 2021-04-26 RX ORDER — FLURBIPROFEN SODIUM 0.3 MG/ML
SOLUTION/ DROPS OPHTHALMIC
Qty: 300 EACH | Refills: 0 | Status: SHIPPED | OUTPATIENT
Start: 2021-04-26 | End: 2021-07-09

## 2021-04-26 RX ORDER — LISINOPRIL 10 MG/1
TABLET ORAL
Qty: 90 TABLET | Refills: 0 | Status: SHIPPED | OUTPATIENT
Start: 2021-04-26 | End: 2021-08-03

## 2021-04-26 RX ORDER — SIMVASTATIN 80 MG
TABLET ORAL
Qty: 45 TABLET | Refills: 0 | Status: SHIPPED | OUTPATIENT
Start: 2021-04-26 | End: 2021-08-03

## 2021-04-26 NOTE — TELEPHONE ENCOUNTER
Prescription approved per [facility] Refill Protocol.  Bruce Chowdary, ADELAIDAN, RN, PHN  [facility] ~ Lead Registered Nurse  Clinic Triage ~ [place] [facility]  April 26, 2021

## 2021-05-12 ENCOUNTER — OFFICE VISIT (OUTPATIENT)
Dept: FAMILY MEDICINE | Facility: CLINIC | Age: 61
End: 2021-05-12
Payer: MEDICARE

## 2021-05-12 VITALS
WEIGHT: 177.3 LBS | HEIGHT: 74 IN | TEMPERATURE: 98.5 F | SYSTOLIC BLOOD PRESSURE: 98 MMHG | HEART RATE: 81 BPM | OXYGEN SATURATION: 96 % | DIASTOLIC BLOOD PRESSURE: 68 MMHG | BODY MASS INDEX: 22.75 KG/M2

## 2021-05-12 DIAGNOSIS — E11.9 TYPE 2 DIABETES MELLITUS WITHOUT COMPLICATION, WITH LONG-TERM CURRENT USE OF INSULIN (H): ICD-10-CM

## 2021-05-12 DIAGNOSIS — E78.5 HYPERLIPIDEMIA LDL GOAL <100: ICD-10-CM

## 2021-05-12 DIAGNOSIS — Z79.4 TYPE 2 DIABETES MELLITUS WITHOUT COMPLICATION, WITH LONG-TERM CURRENT USE OF INSULIN (H): ICD-10-CM

## 2021-05-12 DIAGNOSIS — Z00.00 ENCOUNTER FOR MEDICARE ANNUAL WELLNESS EXAM: Primary | ICD-10-CM

## 2021-05-12 LAB — HBA1C MFR BLD: 7.2 % (ref 0–5.6)

## 2021-05-12 PROCEDURE — 83036 HEMOGLOBIN GLYCOSYLATED A1C: CPT | Performed by: FAMILY MEDICINE

## 2021-05-12 PROCEDURE — 36415 COLL VENOUS BLD VENIPUNCTURE: CPT | Performed by: FAMILY MEDICINE

## 2021-05-12 PROCEDURE — G0439 PPPS, SUBSEQ VISIT: HCPCS | Performed by: FAMILY MEDICINE

## 2021-05-12 PROCEDURE — 99213 OFFICE O/P EST LOW 20 MIN: CPT | Mod: 25 | Performed by: FAMILY MEDICINE

## 2021-05-12 ASSESSMENT — MIFFLIN-ST. JEOR: SCORE: 1679.23

## 2021-05-12 NOTE — PATIENT INSTRUCTIONS
Patient Education   Personalized Prevention Plan  You are due for the preventive services outlined below.  Your care team is available to assist you in scheduling these services.  If you have already completed any of these items, please share that information with your care team to update in your medical record.  Health Maintenance Due   Topic Date Due     ANNUAL REVIEW OF HM ORDERS  Never done     Zoster (Shingles) Vaccine (1 of 2) Never done     A1C Lab  03/30/2021

## 2021-05-12 NOTE — PROGRESS NOTES
SUBJECTIVE:   Aden Vaz is a 60 year old male who presents for Preventive Visit.      Patient has been advised of split billing requirements and indicates understanding: Yes   Are you in the first 12 months of your Medicare coverage?  No    History of Present Illness       Diabetes:   He presents for follow up of diabetes.  He is checking home blood glucose two times daily. He checks blood glucose before meals.  Blood glucose is sometimes over 200 and sometimes over 70. He is aware of hypoglycemia symptoms including shakiness, dizziness, weakness and lethargy. He has no concerns regarding his diabetes at this time.  He is having numbness in feet.         He eats 2-3 servings of fruits and vegetables daily.He consumes 0 sweetened beverage(s) daily.He exercises with enough effort to increase his heart rate 30 to 60 minutes per day.  He exercises with enough effort to increase his heart rate 7 days per week.   He is taking medications regularly.       Wondering if the lisinopril is causing a cough. Cough is rare.     LIPID  Doing well with simvastatin. Not having any issues. No muscle aches or pains. Not due at this time for recheck.     Do you feel safe in your environment? Yes    Have you ever done Advance Care Planning? (For example, a Health Directive, POLST, or a discussion with a medical provider or your loved ones about your wishes): No, advance care planning information given to patient to review.  Patient plans to discuss their wishes with loved ones or provider.         Fall risk  Fallen 2 or more times in the past year?: (P) No  Any fall with injury in the past year?: (P) No  click delete button to remove this line now    Cognitive Screening   1) Repeat 3 items (Leader, Season, Table)    2) Clock draw: NORMAL  3) 3 item recall: Recalls NO objects   Results: 0 items recalled: PROBABLE COGNITIVE IMPAIRMENT, **INFORM PROVIDER**  Patient reports that he was distracted when told the words. No issues with  memory.       Mini-CogTM Copyright S Bob. Licensed by the author for use in [facility]; reprinted with permission (chintan@.[facility]). All rights reserved.      Do you have sleep apnea, excessive snoring or daytime drowsiness?: no    Reviewed and updated as needed this visit by clinical staff  Tobacco  Allergies  Meds  Problems  Med Hx  Surg Hx  Fam Hx  Soc Hx          Reviewed and updated as needed this visit by Provider   Allergies  Meds  Problems            Social History     Tobacco Use     Smoking status: Former Smoker     Packs/day: 1.50     Years: 30.00     Pack years: 45.00     Types: Cigarettes     Quit date: 12/10/2002     Years since quittin.4     Smokeless tobacco: Never Used   Substance Use Topics     Alcohol use: No     Comment: Sober for 31 years     If you drink alcohol do you typically have >3 drinks per day or >7 drinks per week? No    No flowsheet data found.            Current providers sharing in care for this patient include:   Patient Care Team:  Melita Painter MD as PCP - General (Family Practice)  Melita Painter MD as Assigned PCP  Payam Gage MD as MD (Orthopedics)  Isai Kathleen MD as MD (Neurology)    The following health maintenance items are reviewed in Epic and correct as of today:  Health Maintenance Due   Topic Date Due     ZOSTER IMMUNIZATION (1 of 2) Never done     BP Readings from Last 3 Encounters:   21 98/68   10/21/20 124/84   20 (!) 142/84    Wt Readings from Last 3 Encounters:   21 80.4 kg (177 lb 4.8 oz)   20 81.4 kg (179 lb 6.4 oz)   19 79.8 kg (176 lb)                    Any new diagnosis of family breast, ovarian, or bowel cancer? No    FSH-7: No flowsheet data found.          Review of Systems  CONSTITUTIONAL: NEGATIVE for fever, chills, change in weight  INTEGUMENTARY/SKIN: NEGATIVE for worrisome rashes, moles or lesions  EYES: NEGATIVE for vision changes or irritation  ENT/MOUTH: NEGATIVE  "for ear, mouth and throat problems  RESP: NEGATIVE for significant cough or SOB  BREAST: NEGATIVE for masses, tenderness or discharge  CV: NEGATIVE for chest pain, palpitations or peripheral edema  GI: NEGATIVE for nausea, abdominal pain, heartburn, or change in bowel habits  : NEGATIVE for frequency, dysuria, or hematuria  MUSCULOSKELETAL: NEGATIVE for significant arthralgias or myalgia  ENDOCRINE: NEGATIVE for temperature intolerance, skin/hair changes  HEME: NEGATIVE for bleeding problems  PSYCHIATRIC: NEGATIVE for changes in mood or affect    OBJECTIVE:   BP 98/68   Pulse 81   Temp 98.5  F (36.9  C) (Temporal)   Ht 1.88 m (6' 2.02\")   Wt 80.4 kg (177 lb 4.8 oz)   HC 16 cm (6.3\")   SpO2 96%   BMI 22.75 kg/m   Estimated body mass index is 22.75 kg/m  as calculated from the following:    Height as of this encounter: 1.88 m (6' 2.02\").    Weight as of this encounter: 80.4 kg (177 lb 4.8 oz).  Physical Exam  GENERAL: healthy, alert and no distress  EYES: Eyes grossly normal to inspection, PERRL and conjunctivae and sclerae normal  HENT: ear canals and TM's normal, nose and mouth without ulcers or lesions  NECK: no adenopathy, no asymmetry, masses, or scars and thyroid normal to palpation  RESP: lungs clear to auscultation - no rales, rhonchi or wheezes  CV: regular rate and rhythm, normal S1 S2, no S3 or S4, no murmur, click or rub, no peripheral edema and peripheral pulses strong  ABDOMEN: soft, nontender, no hepatosplenomegaly, no masses and bowel sounds normal  MS: no gross musculoskeletal defects noted, no edema  SKIN: no suspicious lesions or rashes  NEURO: Normal strength and tone, mentation intact and speech normal  PSYCH: mentation appears normal, affect normal/bright    Diagnostic Test Results:  Labs reviewed in Epic  pending    ASSESSMENT / PLAN:   1. Encounter for Medicare annual wellness exam  See counseling messages     2. Type 2 diabetes mellitus without complication, with long-term current use " "of insulin (H)  Due for recheck of A1C.   Will notify of results. Adjust medication as needed.   - Hemoglobin A1c    3. Hyperlipidemia LDL goal <100  Not due for recheck at this time.   Recheck in 6 months.       Patient has been advised of split billing requirements and indicates understanding: No  COUNSELING:  Reviewed preventive health counseling, as reflected in patient instructions       Regular exercise       Healthy diet/nutrition    Estimated body mass index is 22.75 kg/m  as calculated from the following:    Height as of this encounter: 1.88 m (6' 2.02\").    Weight as of this encounter: 80.4 kg (177 lb 4.8 oz).        He reports that he quit smoking about 18 years ago. His smoking use included cigarettes. He has a 45.00 pack-year smoking history. He has never used smokeless tobacco.      Appropriate preventive services were discussed with this patient, including applicable screening as appropriate for cardiovascular disease, diabetes, osteopenia/osteoporosis, and glaucoma.  As appropriate for age/gender, discussed screening for colorectal cancer, prostate cancer, breast cancer, and cervical cancer. Checklist reviewing preventive services available has been given to the patient.    Reviewed patients plan of care and provided an AVS. The Basic Care Plan (routine screening as documented in Health Maintenance) for Aden meets the Care Plan requirement. This Care Plan has been established and reviewed with the Patient.    Counseling Resources:  ATP IV Guidelines  Pooled Cohorts Equation Calculator  Breast Cancer Risk Calculator  Breast Cancer: Medication to Reduce Risk  FRAX Risk Assessment  ICSI Preventive Guidelines  Dietary Guidelines for Americans, 2010  Dimple Dough's MyPlate  ASA Prophylaxis  Lung CA Screening    Melita Painter MD  [facility]    Identified Health Risks:  "

## 2021-06-11 DIAGNOSIS — Z79.4 TYPE 2 DIABETES MELLITUS WITHOUT COMPLICATION, WITH LONG-TERM CURRENT USE OF INSULIN (H): ICD-10-CM

## 2021-06-11 DIAGNOSIS — E11.9 TYPE 2 DIABETES MELLITUS WITHOUT COMPLICATION, WITH LONG-TERM CURRENT USE OF INSULIN (H): ICD-10-CM

## 2021-06-11 RX ORDER — CARVEDILOL 25 MG/1
TABLET, FILM COATED ORAL
Qty: 400 STRIP | Refills: 0 | Status: SHIPPED | OUTPATIENT
Start: 2021-06-11 | End: 2021-09-27

## 2021-06-11 NOTE — TELEPHONE ENCOUNTER
Prescription approved per [facility] Refill Protocol.  Tyra Hand RN, BSN  [place]  June 11, 2021

## 2021-06-15 ENCOUNTER — TELEPHONE (OUTPATIENT)
Dept: FAMILY MEDICINE | Facility: CLINIC | Age: 61
End: 2021-06-15

## 2021-06-15 NOTE — TELEPHONE ENCOUNTER
Reason for Call:  Form, our goal is to have forms completed with 72 hours, however, some forms may require a visit or additional information.    Type of letter, form or note:  medical Supply    Who is the form from?: Pharmacy (if other please explain)    Where did the form come from: form was faxed in    What clinic location was the form placed at?: [facility] [phone]    Where the form was placed: TC Box/Folder    What number is listed as a contact on the form?: fax to [phone]       Additional comments: Glucose test strips    Call taken on 6/15/2021 at 1:24 PM by Susanna Perez

## 2021-06-15 NOTE — TELEPHONE ENCOUNTER
Form has been faxed and scanned confirmed on [employer]   Closing encounter  Lucero Manzanares RT (R)

## 2021-06-15 NOTE — TELEPHONE ENCOUNTER
Reason for Call:  Form, our goal is to have forms completed with 72 hours, however, some forms may require a visit or additional information.    Type of letter, form or note:  medical Suppy    Who is the form from?: Pharmacy (if other please explain)    Where did the form come from: form was faxed in    What clinic location was the form placed at?: [facility] [phone]    Where the form was placed: TC Box/Folder    What number is listed as a contact on the form?: fax [phone]       Additional comments:     Call taken on 6/15/2021 at 8:31 AM by Susanna Perez

## 2021-07-08 DIAGNOSIS — Z79.4 TYPE 2 DIABETES MELLITUS WITHOUT COMPLICATION, WITH LONG-TERM CURRENT USE OF INSULIN (H): ICD-10-CM

## 2021-07-08 DIAGNOSIS — E11.9 TYPE 2 DIABETES MELLITUS WITHOUT COMPLICATION, WITH LONG-TERM CURRENT USE OF INSULIN (H): ICD-10-CM

## 2021-07-09 RX ORDER — FLURBIPROFEN SODIUM 0.3 MG/ML
SOLUTION/ DROPS OPHTHALMIC
Qty: 300 EACH | Refills: 1 | Status: SHIPPED | OUTPATIENT
Start: 2021-07-09 | End: 2021-12-10

## 2021-08-01 DIAGNOSIS — E11.9 TYPE 2 DIABETES MELLITUS WITHOUT COMPLICATION, WITH LONG-TERM CURRENT USE OF INSULIN (H): ICD-10-CM

## 2021-08-01 DIAGNOSIS — Z13.6 CARDIOVASCULAR SCREENING; LDL GOAL LESS THAN 100: ICD-10-CM

## 2021-08-01 DIAGNOSIS — Z79.4 TYPE 2 DIABETES MELLITUS WITHOUT COMPLICATION, WITH LONG-TERM CURRENT USE OF INSULIN (H): ICD-10-CM

## 2021-08-03 RX ORDER — SIMVASTATIN 80 MG
TABLET ORAL
Qty: 45 TABLET | Refills: 0 | Status: SHIPPED | OUTPATIENT
Start: 2021-08-03 | End: 2021-10-26

## 2021-08-03 RX ORDER — LISINOPRIL 10 MG/1
TABLET ORAL
Qty: 90 TABLET | Refills: 0 | Status: SHIPPED | OUTPATIENT
Start: 2021-08-03 | End: 2021-10-26

## 2021-08-05 DIAGNOSIS — Z79.4 TYPE 2 DIABETES MELLITUS WITHOUT COMPLICATION, WITH LONG-TERM CURRENT USE OF INSULIN (H): ICD-10-CM

## 2021-08-05 DIAGNOSIS — E11.9 TYPE 2 DIABETES MELLITUS WITHOUT COMPLICATION, WITH LONG-TERM CURRENT USE OF INSULIN (H): ICD-10-CM

## 2021-08-06 RX ORDER — INSULIN ASPART 100 [IU]/ML
INJECTION, SOLUTION INTRAVENOUS; SUBCUTANEOUS
Qty: 30 ML | Refills: 1 | Status: SHIPPED | OUTPATIENT
Start: 2021-08-06 | End: 2022-03-15

## 2021-08-06 NOTE — TELEPHONE ENCOUNTER
Prescription approved per [employer] Refill Protocol.    Lucero Lyles RN on 8/6/2021 at 8:44 AM

## 2021-08-12 ENCOUNTER — TRANSFERRED RECORDS (OUTPATIENT)
Dept: HEALTH INFORMATION MANAGEMENT | Facility: CLINIC | Age: 61
End: 2021-08-12

## 2021-08-12 LAB — RETINOPATHY: NEGATIVE

## 2021-08-17 DIAGNOSIS — Z79.4 TYPE 2 DIABETES MELLITUS WITHOUT COMPLICATION, WITH LONG-TERM CURRENT USE OF INSULIN (H): ICD-10-CM

## 2021-08-17 DIAGNOSIS — E11.9 TYPE 2 DIABETES MELLITUS WITHOUT COMPLICATION, WITH LONG-TERM CURRENT USE OF INSULIN (H): ICD-10-CM

## 2021-08-17 NOTE — TELEPHONE ENCOUNTER
Reason for Call:  Medication or medication refill:    Do you use a [facility]?  Name of the pharmacy and phone number for the current request:  [facility] - [phone]    Name of the medication requested: LANTUS SOLOSTAR PEN INJ 3ML      Call taken on 8/17/2021 at 8:55 AM by Lisa Wayne

## 2021-09-19 ENCOUNTER — HEALTH MAINTENANCE LETTER (OUTPATIENT)
Age: 61
End: 2021-09-19

## 2021-09-20 ENCOUNTER — TELEPHONE (OUTPATIENT)
Dept: LAB | Facility: CLINIC | Age: 61
End: 2021-09-20

## 2021-09-20 DIAGNOSIS — Z79.4 TYPE 2 DIABETES MELLITUS WITHOUT COMPLICATION, WITH LONG-TERM CURRENT USE OF INSULIN (H): Primary | ICD-10-CM

## 2021-09-20 DIAGNOSIS — E11.9 TYPE 2 DIABETES MELLITUS WITHOUT COMPLICATION, WITH LONG-TERM CURRENT USE OF INSULIN (H): Primary | ICD-10-CM

## 2021-09-22 ENCOUNTER — LAB (OUTPATIENT)
Dept: LAB | Facility: CLINIC | Age: 61
End: 2021-09-22
Payer: MEDICARE

## 2021-09-22 DIAGNOSIS — Z79.4 TYPE 2 DIABETES MELLITUS WITHOUT COMPLICATION, WITH LONG-TERM CURRENT USE OF INSULIN (H): ICD-10-CM

## 2021-09-22 DIAGNOSIS — E11.9 TYPE 2 DIABETES MELLITUS WITHOUT COMPLICATION, WITH LONG-TERM CURRENT USE OF INSULIN (H): ICD-10-CM

## 2021-09-22 LAB
ALBUMIN SERPL-MCNC: 3.7 G/DL (ref 3.4–5)
ALP SERPL-CCNC: 89 U/L (ref 40–150)
ALT SERPL W P-5'-P-CCNC: 37 U/L (ref 0–70)
ANION GAP SERPL CALCULATED.3IONS-SCNC: 3 MMOL/L (ref 3–14)
AST SERPL W P-5'-P-CCNC: 24 U/L (ref 0–45)
BILIRUB SERPL-MCNC: 0.5 MG/DL (ref 0.2–1.3)
BUN SERPL-MCNC: 15 MG/DL (ref 7–30)
CALCIUM SERPL-MCNC: 9 MG/DL (ref 8.5–10.1)
CHLORIDE BLD-SCNC: 101 MMOL/L (ref 94–109)
CHOLEST SERPL-MCNC: 153 MG/DL
CO2 SERPL-SCNC: 31 MMOL/L (ref 20–32)
CREAT SERPL-MCNC: 0.98 MG/DL (ref 0.66–1.25)
CREAT UR-MCNC: 23 MG/DL
FASTING STATUS PATIENT QL REPORTED: NO
GFR SERPL CREATININE-BSD FRML MDRD: 83 ML/MIN/1.73M2
GLUCOSE BLD-MCNC: 215 MG/DL (ref 70–99)
HBA1C MFR BLD: 7 % (ref 0–5.6)
HDLC SERPL-MCNC: 51 MG/DL
LDLC SERPL CALC-MCNC: 82 MG/DL
MICROALBUMIN UR-MCNC: <5 MG/L
MICROALBUMIN/CREAT UR: NORMAL MG/G{CREAT}
NONHDLC SERPL-MCNC: 102 MG/DL
POTASSIUM BLD-SCNC: 5 MMOL/L (ref 3.4–5.3)
PROT SERPL-MCNC: 7.4 G/DL (ref 6.8–8.8)
SODIUM SERPL-SCNC: 135 MMOL/L (ref 133–144)
TRIGL SERPL-MCNC: 102 MG/DL

## 2021-09-22 PROCEDURE — 80061 LIPID PANEL: CPT

## 2021-09-22 PROCEDURE — 80053 COMPREHEN METABOLIC PANEL: CPT

## 2021-09-22 PROCEDURE — 83036 HEMOGLOBIN GLYCOSYLATED A1C: CPT

## 2021-09-22 PROCEDURE — 82043 UR ALBUMIN QUANTITATIVE: CPT

## 2021-09-22 PROCEDURE — 36415 COLL VENOUS BLD VENIPUNCTURE: CPT

## 2021-09-23 DIAGNOSIS — E11.9 TYPE 2 DIABETES MELLITUS WITHOUT COMPLICATION, WITH LONG-TERM CURRENT USE OF INSULIN (H): ICD-10-CM

## 2021-09-23 DIAGNOSIS — Z79.4 TYPE 2 DIABETES MELLITUS WITHOUT COMPLICATION, WITH LONG-TERM CURRENT USE OF INSULIN (H): ICD-10-CM

## 2021-09-27 RX ORDER — CARVEDILOL 25 MG/1
TABLET, FILM COATED ORAL
Qty: 400 STRIP | Refills: 0 | Status: SHIPPED | OUTPATIENT
Start: 2021-09-27 | End: 2021-10-29

## 2021-10-26 DIAGNOSIS — Z13.6 CARDIOVASCULAR SCREENING; LDL GOAL LESS THAN 100: ICD-10-CM

## 2021-10-26 DIAGNOSIS — Z79.4 TYPE 2 DIABETES MELLITUS WITHOUT COMPLICATION, WITH LONG-TERM CURRENT USE OF INSULIN (H): ICD-10-CM

## 2021-10-26 DIAGNOSIS — E11.9 TYPE 2 DIABETES MELLITUS WITHOUT COMPLICATION, WITH LONG-TERM CURRENT USE OF INSULIN (H): ICD-10-CM

## 2021-10-26 RX ORDER — CARVEDILOL 25 MG/1
TABLET, FILM COATED ORAL
Qty: 400 STRIP | Refills: 0 | OUTPATIENT
Start: 2021-10-26

## 2021-10-26 RX ORDER — LISINOPRIL 10 MG/1
TABLET ORAL
Qty: 90 TABLET | Refills: 0 | Status: SHIPPED | OUTPATIENT
Start: 2021-10-26 | End: 2021-12-22 | Stop reason: SINTOL

## 2021-10-26 RX ORDER — SIMVASTATIN 80 MG
TABLET ORAL
Qty: 45 TABLET | Refills: 0 | Status: SHIPPED | OUTPATIENT
Start: 2021-10-26 | End: 2022-01-24

## 2021-10-26 NOTE — TELEPHONE ENCOUNTER
Prescription approved per [facility] Refill Protocol.  Strips were ordered on 09/27/21.   ADELAIDA HilarioN, RN, PHN  [place]  October 26, 2021

## 2021-12-06 DIAGNOSIS — E11.9 TYPE 2 DIABETES MELLITUS WITHOUT COMPLICATION, WITH LONG-TERM CURRENT USE OF INSULIN (H): ICD-10-CM

## 2021-12-06 DIAGNOSIS — Z79.4 TYPE 2 DIABETES MELLITUS WITHOUT COMPLICATION, WITH LONG-TERM CURRENT USE OF INSULIN (H): ICD-10-CM

## 2021-12-09 DIAGNOSIS — Z79.4 TYPE 2 DIABETES MELLITUS WITHOUT COMPLICATION, WITH LONG-TERM CURRENT USE OF INSULIN (H): ICD-10-CM

## 2021-12-09 DIAGNOSIS — E11.9 TYPE 2 DIABETES MELLITUS WITHOUT COMPLICATION, WITH LONG-TERM CURRENT USE OF INSULIN (H): ICD-10-CM

## 2021-12-10 RX ORDER — FLURBIPROFEN SODIUM 0.3 MG/ML
SOLUTION/ DROPS OPHTHALMIC
OUTPATIENT
Start: 2021-12-10

## 2021-12-10 RX ORDER — FLURBIPROFEN SODIUM 0.3 MG/ML
SOLUTION/ DROPS OPHTHALMIC
Qty: 300 EACH | Refills: 0 | Status: SHIPPED | OUTPATIENT
Start: 2021-12-10 | End: 2022-05-03

## 2021-12-10 NOTE — TELEPHONE ENCOUNTER
Prescription approved per [facility] Refill Protocol.  ADELAIDA HilarioN, RN, PHN  [place]  December 10, 2021

## 2021-12-17 DIAGNOSIS — Z79.4 TYPE 2 DIABETES MELLITUS WITHOUT COMPLICATION, WITH LONG-TERM CURRENT USE OF INSULIN (H): ICD-10-CM

## 2021-12-17 DIAGNOSIS — E11.9 TYPE 2 DIABETES MELLITUS WITHOUT COMPLICATION, WITH LONG-TERM CURRENT USE OF INSULIN (H): ICD-10-CM

## 2021-12-21 RX ORDER — INSULIN GLARGINE 100 [IU]/ML
9 INJECTION, SOLUTION SUBCUTANEOUS AT BEDTIME
Qty: 15 ML | Refills: 0 | Status: SHIPPED | OUTPATIENT
Start: 2021-12-21 | End: 2022-04-22

## 2021-12-22 ENCOUNTER — OFFICE VISIT (OUTPATIENT)
Dept: FAMILY MEDICINE | Facility: CLINIC | Age: 61
End: 2021-12-22
Payer: MEDICARE

## 2021-12-22 VITALS
HEART RATE: 66 BPM | WEIGHT: 192.5 LBS | RESPIRATION RATE: 14 BRPM | TEMPERATURE: 97.1 F | BODY MASS INDEX: 24.71 KG/M2 | SYSTOLIC BLOOD PRESSURE: 134 MMHG | HEIGHT: 74 IN | OXYGEN SATURATION: 100 % | DIASTOLIC BLOOD PRESSURE: 66 MMHG

## 2021-12-22 DIAGNOSIS — C41.9 CHONDROSARCOMA (H): ICD-10-CM

## 2021-12-22 DIAGNOSIS — Z79.4 TYPE 2 DIABETES MELLITUS WITHOUT COMPLICATION, WITH LONG-TERM CURRENT USE OF INSULIN (H): Primary | ICD-10-CM

## 2021-12-22 DIAGNOSIS — Z12.11 SPECIAL SCREENING FOR MALIGNANT NEOPLASMS, COLON: ICD-10-CM

## 2021-12-22 DIAGNOSIS — E11.9 TYPE 2 DIABETES MELLITUS WITHOUT COMPLICATION, WITH LONG-TERM CURRENT USE OF INSULIN (H): Primary | ICD-10-CM

## 2021-12-22 DIAGNOSIS — Z23 HIGH PRIORITY FOR COVID-19 VACCINATION: ICD-10-CM

## 2021-12-22 DIAGNOSIS — G35 MULTIPLE SCLEROSIS (H): ICD-10-CM

## 2021-12-22 LAB — HBA1C MFR BLD: 6.6 % (ref 0–5.6)

## 2021-12-22 PROCEDURE — 91300 COVID-19,PF,PFIZER (12+ YRS): CPT | Performed by: FAMILY MEDICINE

## 2021-12-22 PROCEDURE — 99207 PR FOOT EXAM NO CHARGE: CPT | Performed by: FAMILY MEDICINE

## 2021-12-22 PROCEDURE — 0004A COVID-19,PF,PFIZER (12+ YRS): CPT | Performed by: FAMILY MEDICINE

## 2021-12-22 PROCEDURE — 36415 COLL VENOUS BLD VENIPUNCTURE: CPT | Performed by: FAMILY MEDICINE

## 2021-12-22 PROCEDURE — 83036 HEMOGLOBIN GLYCOSYLATED A1C: CPT | Performed by: FAMILY MEDICINE

## 2021-12-22 PROCEDURE — 99214 OFFICE O/P EST MOD 30 MIN: CPT | Performed by: FAMILY MEDICINE

## 2021-12-22 RX ORDER — LOSARTAN POTASSIUM 25 MG/1
25 TABLET ORAL DAILY
Qty: 90 TABLET | Refills: 0 | Status: SHIPPED | OUTPATIENT
Start: 2021-12-22 | End: 2022-05-03

## 2021-12-22 ASSESSMENT — MIFFLIN-ST. JEOR: SCORE: 1743.17

## 2021-12-22 NOTE — PROGRESS NOTES
Assessment & Plan     Type 2 diabetes mellitus without complication, with long-term current use of insulin (H)  Patient reports doing well overall.  He notes he has had to change his diet slightly due to the pandemic and having shortages of certain things he likes to eat.  This has altered his sugars slightly to more on the lower side.  He denies any concerns regarding this at this point.  He is due to recheck his A1c and will do so today.  Exam is otherwise without any significant findings.  Anticipate recheck in 6 months  - FOOT EXAM  - losartan (COZAAR) 25 MG tablet; Take 1 tablet (25 mg) by mouth daily  - Hemoglobin A1c; Future  - Hemoglobin A1c    Multiple sclerosis (H)  Patient reports doing well.  No concerns today.  His symptoms have been stable.  No changes made    Chondrosarcoma (H)  Patient plans to schedule with his specialist once there is a pause for reduction of Covid cases.  He will return here sooner as needed.    Special screening for malignant neoplasms, colon  Patient is due for FIT testing.  Cards given today.  - Fecal colorectal cancer screen (FIT); Future    High priority for COVID-19 vaccination  Patient is due for his booster and this is done today.  Pfizer was given.           Blood sugar testing frequency justification:  Patient modifying lifestyle changes (diet, exercise) with blood sugars      Return in about 6 months (around 6/22/2022) for follow up diabetes.    Melita Painter MD  [facility]    [place]     Aden Vaz is a 61 year old male who presents to clinic today for the following health issues:    History of Present Illness       Diabetes:   He presents for follow up of diabetes.  He is checking home blood glucose three times daily. He checks blood glucose before meals.  Blood glucose is sometimes over 200 and sometimes over 70. He is aware of hypoglycemia symptoms including shakiness, weakness, lethargy and confusion. He is concerned about low  "blood sugar, several less than 70 in the past few weeks.  He is having numbness in feet.         He eats 0-1 servings of fruits and vegetables daily.He consumes 0 sweetened beverage(s) daily.He exercises with enough effort to increase his heart rate 20 to 29 minutes per day.  He exercises with enough effort to increase his heart rate 7 days per week.   He is taking medications regularly.     Has noted some lower sugars which he thinks is due to not being able to find the foods he always would eat.     Lisinopril may be causing a dry cough. This has continued.  He would like to consider changing to a different medication.  He has not been diagnosed with hypertension.  He is on the lisinopril due to his diabetes.        MULTIPLE SCLEROSIS  No changes or concerns.  He feels that things have been very stable for him    CHONDROSARCOMA  No changes. Hasn't seen for a bit. Wants to wait until pandemic slows for a bit to schedule an appointment.  He denies any concerns        Review of Systems   CONSTITUTIONAL: NEGATIVE for fever, chills, change in weight  ENT/MOUTH: NEGATIVE for ear, mouth and throat problems  RESP: NEGATIVE for significant cough or SOB  CV: NEGATIVE for chest pain, palpitations or peripheral edema      Objective    /66   Pulse 66   Temp 97.1  F (36.2  C) (Temporal)   Resp 14   Ht 1.88 m (6' 2.02\")   Wt 87.3 kg (192 lb 8 oz)   SpO2 100%   BMI 24.70 kg/m    Body mass index is 24.7 kg/m .  Physical Exam   GENERAL: healthy, alert and no distress  NECK: no adenopathy, no asymmetry, masses, or scars and thyroid normal to palpation  RESP: lungs clear to auscultation - no rales, rhonchi or wheezes  CV: regular rate and rhythm, normal S1 S2, no S3 or S4, no murmur, click or rub, no peripheral edema and peripheral pulses strong  MS: no gross musculoskeletal defects noted, no edema  Diabetic foot exam: normal DP and PT pulses, no trophic changes or ulcerative lesions, normal sensory exam and normal " monofilament exam

## 2021-12-22 NOTE — PATIENT INSTRUCTIONS
Stop Lisinopril    Start Losartan 25 mg daily.      Schedule a blood pressure check with my medical assistant 1-2 weeks after starting losartan.

## 2022-01-24 DIAGNOSIS — Z13.6 CARDIOVASCULAR SCREENING; LDL GOAL LESS THAN 100: ICD-10-CM

## 2022-01-24 RX ORDER — SIMVASTATIN 80 MG
TABLET ORAL
Qty: 45 TABLET | Refills: 0 | Status: SHIPPED | OUTPATIENT
Start: 2022-01-24 | End: 2022-05-03

## 2022-01-24 NOTE — TELEPHONE ENCOUNTER
Prescription approved per [facility] Refill Protocol.  ADELAIDA HilarioN, RN, PHN  [facility]  January 24, 2022

## 2022-02-11 ENCOUNTER — TELEPHONE (OUTPATIENT)
Dept: FAMILY MEDICINE | Facility: CLINIC | Age: 62
End: 2022-02-11
Payer: COMMERCIAL

## 2022-02-11 NOTE — TELEPHONE ENCOUNTER
Reason for Call:  Form, our goal is to have forms completed with 72 hours, however, some forms may require a visit or additional information.    Type of letter, form or note:  Diabetic testing supplies    Who is the form from?: Pharmacy (if other please explain)    Where did the form come from: form was faxed in    What clinic location was the form placed at?:  [facility]  Where the form was placed: [facility] Box/Folder    What number is listed as a contact on the form?: [phone]       Additional comments:     Call taken on 2/11/2022 at 8:17 AM by Susanna Perez

## 2022-02-21 ENCOUNTER — MEDICAL CORRESPONDENCE (OUTPATIENT)
Dept: HEALTH INFORMATION MANAGEMENT | Facility: CLINIC | Age: 62
End: 2022-02-21
Payer: COMMERCIAL

## 2022-02-21 ENCOUNTER — TELEPHONE (OUTPATIENT)
Dept: FAMILY MEDICINE | Facility: CLINIC | Age: 62
End: 2022-02-21
Payer: COMMERCIAL

## 2022-02-21 NOTE — TELEPHONE ENCOUNTER
Reason for Call:  Form, our goal is to have forms completed with 72 hours, however, some forms may require a visit or additional information.    Type of letter, form or note:  Diabetic testing supplies    Who is the form from?: Pharmacy (if other please explain)    Where did the form come from: form was faxed in    What clinic location was the form placed at?: [facility] [phone]    Where the form was placed: [facility] Box/Folder    What number is listed as a contact on the form?:  Fax [phone]       Additional comments:     Call taken on 2/21/2022 at 7:36 AM by Susanna Perez

## 2022-03-01 ENCOUNTER — ALLIED HEALTH/NURSE VISIT (OUTPATIENT)
Dept: FAMILY MEDICINE | Facility: CLINIC | Age: 62
End: 2022-03-01
Payer: COMMERCIAL

## 2022-03-01 ENCOUNTER — LAB (OUTPATIENT)
Dept: LAB | Facility: CLINIC | Age: 62
End: 2022-03-01
Payer: MEDICARE

## 2022-03-01 VITALS — SYSTOLIC BLOOD PRESSURE: 136 MMHG | DIASTOLIC BLOOD PRESSURE: 74 MMHG

## 2022-03-01 DIAGNOSIS — Z01.30 BP CHECK: Primary | ICD-10-CM

## 2022-03-01 DIAGNOSIS — Z12.11 SPECIAL SCREENING FOR MALIGNANT NEOPLASMS, COLON: ICD-10-CM

## 2022-03-01 PROCEDURE — 82274 ASSAY TEST FOR BLOOD FECAL: CPT

## 2022-03-01 PROCEDURE — 99207 PR NO CHARGE NURSE ONLY: CPT

## 2022-03-01 NOTE — PROGRESS NOTES
Chief Complaint   Patient presents with     Allied Health Visit     Aden Vaz is a 61 year old patient who comes in today for a Blood Pressure check.  Initial BP:  /74      Data Unavailable  Disposition: follow-up as previously indicated by provider    Marylin Hall CMA ([facility])

## 2022-03-03 LAB — HEMOCCULT STL QL IA: NEGATIVE

## 2022-03-14 DIAGNOSIS — Z79.4 TYPE 2 DIABETES MELLITUS WITHOUT COMPLICATION, WITH LONG-TERM CURRENT USE OF INSULIN (H): ICD-10-CM

## 2022-03-14 DIAGNOSIS — E11.9 TYPE 2 DIABETES MELLITUS WITHOUT COMPLICATION, WITH LONG-TERM CURRENT USE OF INSULIN (H): ICD-10-CM

## 2022-03-15 RX ORDER — CARVEDILOL 25 MG/1
TABLET, FILM COATED ORAL
Qty: 400 STRIP | Refills: 0 | Status: SHIPPED | OUTPATIENT
Start: 2022-03-15 | End: 2022-06-08

## 2022-03-15 RX ORDER — INSULIN ASPART 100 [IU]/ML
INJECTION, SOLUTION INTRAVENOUS; SUBCUTANEOUS
Qty: 30 ML | Refills: 1 | Status: SHIPPED | OUTPATIENT
Start: 2022-03-15 | End: 2022-06-08

## 2022-03-15 NOTE — TELEPHONE ENCOUNTER
Pending Prescriptions:                       Disp   Refills    CONTOUR TEST test strip [Pharmacy Med Nam*400 st*0            Sig: USE TO TEST BLOOD SUGAR THREE TIMES DAILY    NOVOLOG FLEXPEN 100 UNIT/ML soln [Pharmac*30 mL  1            Sig: INJECT 6 UNITS WITH BREAKFAST, 4 UNITS AT LUNCH           AND 10 UNITS AT DINNER    Medication is being filled for 1 time jazmyn refill only due to:  Patient is due for diabetes in June    Please call and help schedule.  Thank you!

## 2022-04-21 DIAGNOSIS — E11.9 TYPE 2 DIABETES MELLITUS WITHOUT COMPLICATION, WITH LONG-TERM CURRENT USE OF INSULIN (H): ICD-10-CM

## 2022-04-21 DIAGNOSIS — Z79.4 TYPE 2 DIABETES MELLITUS WITHOUT COMPLICATION, WITH LONG-TERM CURRENT USE OF INSULIN (H): ICD-10-CM

## 2022-04-22 RX ORDER — INSULIN GLARGINE 100 [IU]/ML
INJECTION, SOLUTION SUBCUTANEOUS
Qty: 15 ML | Refills: 0 | Status: SHIPPED | OUTPATIENT
Start: 2022-04-22 | End: 2022-06-08

## 2022-05-02 DIAGNOSIS — Z13.6 CARDIOVASCULAR SCREENING; LDL GOAL LESS THAN 100: ICD-10-CM

## 2022-05-02 DIAGNOSIS — E11.9 TYPE 2 DIABETES MELLITUS WITHOUT COMPLICATION, WITH LONG-TERM CURRENT USE OF INSULIN (H): ICD-10-CM

## 2022-05-02 DIAGNOSIS — Z79.4 TYPE 2 DIABETES MELLITUS WITHOUT COMPLICATION, WITH LONG-TERM CURRENT USE OF INSULIN (H): ICD-10-CM

## 2022-05-03 RX ORDER — LOSARTAN POTASSIUM 25 MG/1
TABLET ORAL
Qty: 90 TABLET | Refills: 0 | Status: SHIPPED | OUTPATIENT
Start: 2022-05-03 | End: 2022-06-08

## 2022-05-03 RX ORDER — FLURBIPROFEN SODIUM 0.3 MG/ML
SOLUTION/ DROPS OPHTHALMIC
Qty: 300 EACH | Refills: 0 | Status: SHIPPED | OUTPATIENT
Start: 2022-05-03 | End: 2022-06-08

## 2022-05-03 RX ORDER — SIMVASTATIN 80 MG
TABLET ORAL
Qty: 45 TABLET | Refills: 0 | Status: SHIPPED | OUTPATIENT
Start: 2022-05-03 | End: 2022-06-08

## 2022-05-03 NOTE — TELEPHONE ENCOUNTER
Alexandra  Next 5 appointments (look out 90 days)    Jun 08, 2022  3:00 PM  (Arrive by 2:40 PM)  Provider Visit with Melita Painter MD  [facility] ([facility] ) [address], [address]  [place] [id number] [phone]

## 2022-06-08 ENCOUNTER — OFFICE VISIT (OUTPATIENT)
Dept: FAMILY MEDICINE | Facility: CLINIC | Age: 62
End: 2022-06-08
Payer: MEDICARE

## 2022-06-08 VITALS
WEIGHT: 188 LBS | RESPIRATION RATE: 12 BRPM | OXYGEN SATURATION: 100 % | SYSTOLIC BLOOD PRESSURE: 132 MMHG | BODY MASS INDEX: 24.13 KG/M2 | HEIGHT: 74 IN | HEART RATE: 74 BPM | TEMPERATURE: 97.5 F | DIASTOLIC BLOOD PRESSURE: 62 MMHG

## 2022-06-08 DIAGNOSIS — G35 MULTIPLE SCLEROSIS (H): ICD-10-CM

## 2022-06-08 DIAGNOSIS — E78.5 HYPERLIPIDEMIA LDL GOAL <100: ICD-10-CM

## 2022-06-08 DIAGNOSIS — Z79.4 TYPE 2 DIABETES MELLITUS WITHOUT COMPLICATION, WITH LONG-TERM CURRENT USE OF INSULIN (H): Primary | ICD-10-CM

## 2022-06-08 DIAGNOSIS — C41.9 CHONDROSARCOMA (H): ICD-10-CM

## 2022-06-08 DIAGNOSIS — E11.9 TYPE 2 DIABETES MELLITUS WITHOUT COMPLICATION, WITH LONG-TERM CURRENT USE OF INSULIN (H): Primary | ICD-10-CM

## 2022-06-08 LAB — HBA1C MFR BLD: 6.7 % (ref 0–5.6)

## 2022-06-08 PROCEDURE — 99214 OFFICE O/P EST MOD 30 MIN: CPT | Mod: 25 | Performed by: FAMILY MEDICINE

## 2022-06-08 PROCEDURE — 36415 COLL VENOUS BLD VENIPUNCTURE: CPT | Performed by: FAMILY MEDICINE

## 2022-06-08 PROCEDURE — 91305 COVID-19,PF,PFIZER (12+ YRS): CPT | Performed by: FAMILY MEDICINE

## 2022-06-08 PROCEDURE — 0054A COVID-19,PF,PFIZER (12+ YRS): CPT | Performed by: FAMILY MEDICINE

## 2022-06-08 PROCEDURE — 83036 HEMOGLOBIN GLYCOSYLATED A1C: CPT | Performed by: FAMILY MEDICINE

## 2022-06-08 RX ORDER — INSULIN GLARGINE 100 [IU]/ML
INJECTION, SOLUTION SUBCUTANEOUS
Qty: 15 ML | Refills: 3 | Status: SHIPPED | OUTPATIENT
Start: 2022-06-08 | End: 2023-06-09

## 2022-06-08 RX ORDER — SIMVASTATIN 80 MG
TABLET ORAL
Qty: 45 TABLET | Refills: 3 | Status: SHIPPED | OUTPATIENT
Start: 2022-06-08 | End: 2023-07-31

## 2022-06-08 RX ORDER — CARVEDILOL 25 MG/1
TABLET, FILM COATED ORAL
Qty: 400 STRIP | Refills: 3 | Status: SHIPPED | OUTPATIENT
Start: 2022-06-08 | End: 2023-03-05

## 2022-06-08 RX ORDER — LOSARTAN POTASSIUM 25 MG/1
25 TABLET ORAL DAILY
Qty: 90 TABLET | Refills: 3 | Status: SHIPPED | OUTPATIENT
Start: 2022-06-08 | End: 2023-07-31

## 2022-06-08 RX ORDER — FLURBIPROFEN SODIUM 0.3 MG/ML
SOLUTION/ DROPS OPHTHALMIC
Qty: 300 EACH | Refills: 4 | Status: SHIPPED | OUTPATIENT
Start: 2022-06-08 | End: 2023-07-31

## 2022-06-08 RX ORDER — INSULIN ASPART 100 [IU]/ML
INJECTION, SOLUTION INTRAVENOUS; SUBCUTANEOUS
Qty: 30 ML | Refills: 3 | Status: SHIPPED | OUTPATIENT
Start: 2022-06-08 | End: 2023-06-09

## 2022-06-08 ASSESSMENT — PAIN SCALES - GENERAL: PAINLEVEL: NO PAIN (0)

## 2022-06-08 NOTE — PROGRESS NOTES
Assessment & Plan     Type 2 diabetes mellitus without complication, with long-term current use of insulin (H)  Patient is due for recheck of A1C at this time.   He reports overall is doing well.   Has occasionally had trouble with being able to access the foods he usually eats. Due to pandemic.   He is able to more consistently now.   Feels he is doing well.   Needs refills today.   Will recheck labs and Will notify of results.   - HEMOGLOBIN A1C; Future  - insulin aspart (NOVOLOG FLEXPEN) 100 UNIT/ML pen; INJECT 6 UNITS WITH BREAKFAST, 4 UNITS AT LUNCH AND 10 UNITS AT DINNER  - blood glucose (CONTOUR TEST) test strip; USE TO TEST BLOOD SUGAR FOUR TIMES DAILY  - insulin glargine (LANTUS SOLOSTAR) 100 UNIT/ML pen; INJECT 10 UNITS UNDER THE SKIN AT BEDTIME  - insulin pen needle (B-D U/F) 31G X 5 MM miscellaneous; USE FOUR TIMES DAILY WITH NOVOLOG AND LANTUS PENS  - losartan (COZAAR) 25 MG tablet; Take 1 tablet (25 mg) by mouth daily    Chondrosarcoma (H)  No current issues and has no scheduled follow up with specialty at this time.   Will recheck if any concerns.     Multiple sclerosis (H)  Has a brief episode of equilibrium change.  This has now resolved.   No other concerns today.   Will get back in touch with neurology as needed.     Hyperlipidemia LDL goal <100  Doing well. Well controlled. Tolerating medication.  No change in plan.    Due for recheck of labs again at next visit.   - simvastatin (ZOCOR) 80 MG tablet; TAKE 1/2 TABLET BY MOUTH DAILY           Blood sugar testing frequency justification:  Risk of hypoglycemia with medication(s)      Return in about 6 months (around 12/8/2022) for follow up diabetes, Annual Wellness.    Melita Painter MD  [facility] SAI Camejo is a 61 year old who presents for the following health issues     History of Present Illness       Diabetes:   He presents for follow up of diabetes.  He is checking home blood glucose three times daily. He  "checks blood glucose before meals.  Blood glucose is sometimes over 200 and sometimes under 70. He is aware of hypoglycemia symptoms including shakiness, weakness, lethargy and confusion. He has no concerns regarding his diabetes at this time.  He is having numbness in feet, burning in feet and excessive thirst.         He eats 0-1 servings of fruits and vegetables daily.He consumes 0 sweetened beverage(s) daily.He exercises with enough effort to increase his heart rate 20 to 29 minutes per day.  He exercises with enough effort to increase his heart rate 7 days per week.   He is taking medications regularly.     DIABETES  Reports that a couple months ago had some trouble with getting what he needed to eat. And sugars were going up. Now seems to be better.  Last 1-2 months have been better.   Due for labs today.      MULTIPLE SCLEROSIS  Reports a spell about a month ago. Rolled over during the night and was disoriented. Felt like when he was when first diagnosed.  This episode lasted a couple days. Since then left eye, that had \"been messed up\" since 2000, seemed better for a few days. Equilibrium seems better now.    CHONDROSARCOMA  No recent follow up with orthopedics.     Hyperlipidemia Follow-Up      Are you regularly taking any medication or supplement to lower your cholesterol?   Yes- simvastatin    Are you having muscle aches or other side effects that you think could be caused by your cholesterol lowering medication?  No        Review of Systems   CONSTITUTIONAL: NEGATIVE for fever, chills, change in weight  ENT/MOUTH: NEGATIVE for ear, mouth and throat problems  RESP: NEGATIVE for significant cough or SOB  CV: NEGATIVE for chest pain, palpitations or peripheral edema      Objective    /62   Pulse 74   Temp 97.5  F (36.4  C) (Temporal)   Resp 12   Ht 1.88 m (6' 2.02\")   Wt 85.3 kg (188 lb)   SpO2 100%   BMI 24.12 kg/m    Body mass index is 24.12 kg/m .  Physical Exam   GENERAL: healthy, alert and " no distress  NECK: no adenopathy, no asymmetry, masses, or scars and thyroid normal to palpation  RESP: lungs clear to auscultation - no rales, rhonchi or wheezes  CV: regular rate and rhythm, normal S1 S2, no S3 or S4, no murmur, click or rub, no peripheral edema and peripheral pulses strong  MS: no edema of lower extremities  PSYCH: mentation appears normal, affect normal/bright

## 2022-06-26 ENCOUNTER — HEALTH MAINTENANCE LETTER (OUTPATIENT)
Age: 62
End: 2022-06-26

## 2022-08-18 ENCOUNTER — TRANSFERRED RECORDS (OUTPATIENT)
Dept: FAMILY MEDICINE | Facility: CLINIC | Age: 62
End: 2022-08-18

## 2022-08-18 LAB — RETINOPATHY: NEGATIVE

## 2022-10-17 ENCOUNTER — IMMUNIZATION (OUTPATIENT)
Dept: FAMILY MEDICINE | Facility: CLINIC | Age: 62
End: 2022-10-17
Payer: MEDICARE

## 2022-10-17 DIAGNOSIS — Z23 NEED FOR PROPHYLACTIC VACCINATION AND INOCULATION AGAINST INFLUENZA: Primary | ICD-10-CM

## 2022-10-17 DIAGNOSIS — Z23 HIGH PRIORITY FOR 2019-NCOV VACCINE: ICD-10-CM

## 2022-10-17 PROCEDURE — 91312 COVID-19,PF,PFIZER BOOSTER BIVALENT: CPT

## 2022-10-17 PROCEDURE — 0124A COVID-19,PF,PFIZER BOOSTER BIVALENT: CPT

## 2022-10-17 PROCEDURE — 90471 IMMUNIZATION ADMIN: CPT

## 2022-10-17 PROCEDURE — 99207 PR NO CHARGE NURSE ONLY: CPT

## 2022-10-17 PROCEDURE — 90682 RIV4 VACC RECOMBINANT DNA IM: CPT

## 2022-11-18 DIAGNOSIS — Z96.649 MECHANICAL COMPLICATION OF PROSTHETIC HIP IMPLANT (H): Primary | ICD-10-CM

## 2022-11-18 DIAGNOSIS — T84.098A MECHANICAL COMPLICATION OF PROSTHETIC HIP IMPLANT (H): Primary | ICD-10-CM

## 2022-11-18 RX ORDER — AMOXICILLIN 500 MG/1
TABLET, FILM COATED ORAL
Qty: 4 TABLET | Refills: 3 | Status: SHIPPED | OUTPATIENT
Start: 2022-11-18 | End: 2024-06-03

## 2022-12-08 ASSESSMENT — ENCOUNTER SYMPTOMS
CHILLS: 0
DYSURIA: 0
COUGH: 0
HEADACHES: 0
JOINT SWELLING: 0
WEAKNESS: 0
DIZZINESS: 0
HEMATOCHEZIA: 0
ABDOMINAL PAIN: 0
ARTHRALGIAS: 1
NAUSEA: 0
NERVOUS/ANXIOUS: 1
EYE PAIN: 0
HEARTBURN: 0
FEVER: 0
DIARRHEA: 0
HEMATURIA: 0
PALPITATIONS: 0
PARESTHESIAS: 0
CONSTIPATION: 0
MYALGIAS: 0
SHORTNESS OF BREATH: 0
SORE THROAT: 0
FREQUENCY: 1

## 2022-12-08 ASSESSMENT — ACTIVITIES OF DAILY LIVING (ADL): CURRENT_FUNCTION: NO ASSISTANCE NEEDED

## 2022-12-14 ENCOUNTER — OFFICE VISIT (OUTPATIENT)
Dept: FAMILY MEDICINE | Facility: CLINIC | Age: 62
End: 2022-12-14
Payer: MEDICARE

## 2022-12-14 VITALS
TEMPERATURE: 97.3 F | HEIGHT: 73 IN | HEART RATE: 79 BPM | BODY MASS INDEX: 24.85 KG/M2 | SYSTOLIC BLOOD PRESSURE: 136 MMHG | DIASTOLIC BLOOD PRESSURE: 72 MMHG | OXYGEN SATURATION: 98 % | WEIGHT: 187.5 LBS

## 2022-12-14 DIAGNOSIS — R45.82 WORRIES: ICD-10-CM

## 2022-12-14 DIAGNOSIS — Z79.4 TYPE 2 DIABETES MELLITUS WITHOUT COMPLICATION, WITH LONG-TERM CURRENT USE OF INSULIN (H): ICD-10-CM

## 2022-12-14 DIAGNOSIS — E78.5 HYPERLIPIDEMIA LDL GOAL <100: ICD-10-CM

## 2022-12-14 DIAGNOSIS — E11.9 TYPE 2 DIABETES MELLITUS WITHOUT COMPLICATION, WITH LONG-TERM CURRENT USE OF INSULIN (H): ICD-10-CM

## 2022-12-14 DIAGNOSIS — Z00.00 ENCOUNTER FOR MEDICARE ANNUAL WELLNESS EXAM: Primary | ICD-10-CM

## 2022-12-14 LAB — HBA1C MFR BLD: 7 % (ref 0–5.6)

## 2022-12-14 PROCEDURE — 99214 OFFICE O/P EST MOD 30 MIN: CPT | Mod: 25 | Performed by: FAMILY MEDICINE

## 2022-12-14 PROCEDURE — 80061 LIPID PANEL: CPT | Performed by: FAMILY MEDICINE

## 2022-12-14 PROCEDURE — 90715 TDAP VACCINE 7 YRS/> IM: CPT | Performed by: FAMILY MEDICINE

## 2022-12-14 PROCEDURE — 90471 IMMUNIZATION ADMIN: CPT | Performed by: FAMILY MEDICINE

## 2022-12-14 PROCEDURE — 36415 COLL VENOUS BLD VENIPUNCTURE: CPT | Performed by: FAMILY MEDICINE

## 2022-12-14 PROCEDURE — 80053 COMPREHEN METABOLIC PANEL: CPT | Performed by: FAMILY MEDICINE

## 2022-12-14 PROCEDURE — 83036 HEMOGLOBIN GLYCOSYLATED A1C: CPT | Performed by: FAMILY MEDICINE

## 2022-12-14 PROCEDURE — G0439 PPPS, SUBSEQ VISIT: HCPCS | Performed by: FAMILY MEDICINE

## 2022-12-14 ASSESSMENT — ENCOUNTER SYMPTOMS
COUGH: 0
PALPITATIONS: 0
DYSURIA: 0
SHORTNESS OF BREATH: 0
MYALGIAS: 0
HEMATOCHEZIA: 0
HEARTBURN: 0
ARTHRALGIAS: 1
NERVOUS/ANXIOUS: 1
DIARRHEA: 0
WEAKNESS: 0
EYE PAIN: 0
HEADACHES: 0
CHILLS: 0
SORE THROAT: 0
PARESTHESIAS: 0
NAUSEA: 0
CONSTIPATION: 0
JOINT SWELLING: 0
HEMATURIA: 0
FREQUENCY: 1
ABDOMINAL PAIN: 0
DIZZINESS: 0
FEVER: 0

## 2022-12-14 ASSESSMENT — ACTIVITIES OF DAILY LIVING (ADL): CURRENT_FUNCTION: NO ASSISTANCE NEEDED

## 2022-12-14 ASSESSMENT — PAIN SCALES - GENERAL: PAINLEVEL: MODERATE PAIN (4)

## 2022-12-14 NOTE — PROGRESS NOTES
The patient was provided with suggestions to help him develop a healthy physical lifestyle.  The patient was counseled and encouraged to consider modifying their diet and eating habits. He was provided with information on recommended healthy diet options.  The patient was provided with suggestions to help him develop a healthy emotional lifestyle.

## 2022-12-14 NOTE — PROGRESS NOTES
"SUBJECTIVE:   Bashir is a 62 year old who presents for Preventive Visit.    Are you in the first 12 months of your Medicare coverage?  No    Healthy Habits:     In general, how would you rate your overall health?  Fair    Frequency of exercise:  6-7 days/week    Duration of exercise:  30-45 minutes    Do you usually eat at least 4 servings of fruit and vegetables a day, include whole grains    & fiber and avoid regularly eating high fat or \"junk\" foods?  No    Taking medications regularly:  Yes    Medication side effects:  None    Ability to successfully perform activities of daily living:  No assistance needed    Home Safety:  No safety concerns identified    Hearing Impairment:  No hearing concerns    In the past 6 months, have you been bothered by leaking of urine?  No    In general, how would you rate your overall mental or emotional health?  Fair      PHQ-2 Total Score: 0    Additional concerns today:  No      Have you ever done Advance Care Planning? (For example, a Health Directive, POLST, or a discussion with a medical provider or your loved ones about your wishes): No, advance care planning information given to patient to review.  Patient declined advance care planning discussion at this time.       Fall risk  Fallen 2 or more times in the past year?: No  Any fall with injury in the past year?: No      Cognitive Screening   1) Repeat 3 items (Leader, Season, Table)    2) Clock draw: NORMAL  3) 3 item recall: Recalls 3 objects  Results: 3 items recalled: COGNITIVE IMPAIRMENT LESS LIKELY    Mini-CogTM Copyright ALIZA Rojas. Licensed by the author for use in [facility]; reprinted with permission (chintan@.[facility]). All rights reserved.      Do you have sleep apnea, excessive snoring or daytime drowsiness?: no    Reviewed and updated as needed this visit by clinical staff   Tobacco  Allergies  Meds              Reviewed and updated as needed this visit by Provider                 Social History "     Tobacco Use     Smoking status: Former     Packs/day: 1.50     Years: 30.00     Pack years: 45.00     Types: Cigarettes     Quit date: 12/10/2002     Years since quittin.0     Smokeless tobacco: Never   Substance Use Topics     Alcohol use: No     Comment: Sober for 31 years       Alcohol Use 2022   Prescreen: >3 drinks/day or >7 drinks/week? No   Prescreen: >3 drinks/day or >7 drinks/week? -         Hyperlipidemia Follow-Up      Are you regularly taking any medication or supplement to lower your cholesterol?   Yes- simvastatin    Are you having muscle aches or other side effects that you think could be caused by your cholesterol lowering medication?  No    Hypertension Follow-up      Do you check your blood pressure regularly outside of the clinic? No     Are you following a low salt diet? Yes    Are your blood pressures ever more than 140 on the top number (systolic) OR more   than 90 on the bottom number (diastolic), for example 140/90? No      Current providers sharing in care for this patient include:   Patient Care Team:  Melita Painter MD as PCP - General (Family Practice)  Melita Pianter MD as Assigned PCP  Payam Gage MD as MD (Orthopedics)  Isai Kathleen MD as MD (Neurology)    The following health maintenance items are reviewed in Epic and correct as of today:  Health Maintenance   Topic Date Due     DTAP/TDAP/TD IMMUNIZATION (2 - Td or Tdap) 2022     MEDICARE ANNUAL WELLNESS VISIT  2022     CMP  2022     LIPID  2022     MICROALBUMIN  2022     A1C  2022     DIABETIC FOOT EXAM  2022     COLORECTAL CANCER SCREENING  2023     ANNUAL REVIEW OF HM ORDERS  2023     EYE EXAM  2023     Pneumococcal Vaccine: Pediatrics (0 to 5 Years) and At-Risk Patients (6 to 64 Years) (3 - PPSV23 if available, else PCV20) 2024     ADVANCE CARE PLANNING  2026     HEPATITIS C SCREENING  Completed     PHQ-2 (once per  "calendar year)  Completed     INFLUENZA VACCINE  Completed     COVID-19 Vaccine  Completed     IPV IMMUNIZATION  Aged Out     MENINGITIS IMMUNIZATION  Aged Out     BMP  Discontinued     HIV SCREENING  Discontinued     ZOSTER IMMUNIZATION  Discontinued     BP Readings from Last 3 Encounters:   12/14/22 (!) 153/76   06/08/22 132/62   03/01/22 136/74    Wt Readings from Last 3 Encounters:   12/14/22 85 kg (187 lb 8 oz)   06/08/22 85.3 kg (188 lb)   12/22/21 87.3 kg (192 lb 8 oz)                    Review of Systems   Constitutional: Negative for chills and fever.   HENT: Negative for congestion, ear pain, hearing loss and sore throat.    Eyes: Negative for pain and visual disturbance.   Respiratory: Negative for cough and shortness of breath.    Cardiovascular: Negative for chest pain, palpitations and peripheral edema.   Gastrointestinal: Negative for abdominal pain, constipation, diarrhea, heartburn, hematochezia and nausea.   Genitourinary: Positive for frequency and urgency. Negative for dysuria, genital sores, hematuria, impotence and penile discharge.   Musculoskeletal: Positive for arthralgias. Negative for joint swelling and myalgias.   Skin: Negative for rash.   Neurological: Negative for dizziness, weakness, headaches and paresthesias.   Psychiatric/Behavioral: Negative for mood changes. The patient is nervous/anxious.          OBJECTIVE:   /72   Pulse 79   Temp 97.3  F (36.3  C) (Temporal)   Ht 1.862 m (6' 1.31\")   Wt 85 kg (187 lb 8 oz)   SpO2 98%   BMI 24.53 kg/m   Estimated body mass index is 24.53 kg/m  as calculated from the following:    Height as of this encounter: 1.862 m (6' 1.31\").    Weight as of this encounter: 85 kg (187 lb 8 oz).  Physical Exam  GENERAL: healthy, alert and no distress  NECK: no adenopathy, no asymmetry, masses, or scars and thyroid normal to palpation  RESP: lungs clear to auscultation - no rales, rhonchi or wheezes  CV: regular rate and rhythm, normal S1 S2, no S3 " or S4, no murmur, click or rub, no peripheral edema and peripheral pulses strong  MS: no gross musculoskeletal defects noted, no edema  Diabetic foot exam: normal DP and PT pulses, no trophic changes or ulcerative lesions, normal sensory exam and normal monofilament exam    Diagnostic Test Results:  Labs reviewed in Epic    ASSESSMENT / PLAN:   (Z00.00) Encounter for Medicare annual wellness exam  (primary encounter diagnosis)  Comment: See counseling messages  Plan: Recheck in 1 year    (E11.9,  Z79.4) Type 2 diabetes mellitus without complication, with long-term current use of insulin (H)  Comment: Patient has been following his blood sugars.  He has occasional elevations but overall feels things have been unchanged.  He is tolerating his medication without any problems or concerns.  He is due for labs today and these will be done.  Will notify results and adjust as needed.  Plan: COMPREHENSIVE METABOLIC PANEL, Lipid panel         reflex to direct LDL Non-fasting, Albumin         Random Urine Quantitative with Creat Ratio,         HEMOGLOBIN A1C        Recheck in 6 months    (E78.5) Hyperlipidemia LDL goal <100  Comment: Patient is due for recheck today.  Labs will be done.  Is not having any issues with his medications.  Plan: We will notify results and adjust if needed    (R45.82) Worries  Comment: Patient discusses many worries during his visit today.  He has some concern regarding his son who had a recent injury.  He also is at the time of the year where he is a lot of anniversaries of people that he has lost.  Patient has been thinking a lot about his diagnoses including the MS which he thought would have shorten his life span but has not.  He finds he does not see his family as often and finds his day-to-day very much the same.  He reports feeling irritable about things at times.  Discussed with patient considering option of seeing a counselor which she declined at this point.  Discussed meditation.  He has  done this in the past and did not think it was helpful.  He has not done this for couple of years.  He will consider returning to [facility].  Discussed evaluating if medication may be an option but patient not very interested in that at this point.  Plan: We will follow-up with patient again in 6 months or sooner if any problems or concerns.    Patient has been advised of split billing requirements and indicates understanding: Yes      COUNSELING:  Reviewed preventive health counseling, as reflected in patient instructions       Regular exercise       Healthy diet/nutrition        He reports that he quit smoking about 20 years ago. His smoking use included cigarettes. He has a 45.00 pack-year smoking history. He has never used smokeless tobacco.      Appropriate preventive services were discussed with this patient, including applicable screening as appropriate for cardiovascular disease, diabetes, osteopenia/osteoporosis, and glaucoma.  As appropriate for age/gender, discussed screening for colorectal cancer, prostate cancer, breast cancer, and cervical cancer. Checklist reviewing preventive services available has been given to the patient.    Reviewed patients plan of care and provided an AVS. The Basic Care Plan (routine screening as documented in Health Maintenance) for Aden meets the Care Plan requirement. This Care Plan has been established and reviewed with the Patient.          Melita Painter MD  [facility]    Identified Health Risks:

## 2022-12-14 NOTE — PATIENT INSTRUCTIONS
Patient Education   Personalized Prevention Plan  You are due for the preventive services outlined below.  Your care team is available to assist you in scheduling these services.  If you have already completed any of these items, please share that information with your care team to update in your medical record.  Health Maintenance Due   Topic Date Due     Diptheria Tetanus Pertussis (DTAP/TDAP/TD) Vaccine (2 - Td or Tdap) 03/13/2022     Annual Wellness Visit  05/12/2022     Comprehensive Metabolic Panel  09/22/2022     Cholesterol Lab  09/22/2022     Kidney Microalbumin Urine Test  09/22/2022     A1C Lab  12/08/2022     Diabetic Foot Exam  12/22/2022     Your Health Risk Assessment indicates you feel you are not in good health    A healthy lifestyle helps keep the body fit and the mind alert. It helps protect you from disease, helps you fight disease, and helps prevent chronic disease (disease that doesn't go away) from getting worse. This is important as you get older and begin to notice twinges in muscles and joints and a decline in the strength and stamina you once took for granted. A healthy lifestyle includes good healthcare, good nutrition, weight control, recreation, and regular exercise. Avoid harmful substances and do what you can to keep safe. Another part of a healthy lifestyle is stay mentally active and socially involved.    Good healthcare     Have a wellness visit every year.     If you have new symptoms, let us know right away. Don't wait until the next checkup.     Take medicines exactly as prescribed and keep your medicines in a safe place. Tell us if your medicine causes problems.   Healthy diet and weight control     Eat 3 or 4 small, nutritious, low-fat, high-fiber meals a day. Include a variety of fruits, vegetables, and whole-grain foods.     Make sure you get enough calcium in your diet. Calcium, vitamin D, and exercise help prevent osteoporosis (bone thinning).     If you live alone, try  eating with others when you can. That way you get a good meal and have company while you eat it.     Try to keep a healthy weight. If you eat more calories than your body uses for energy, it will be stored as fat and you will gain weight.     Recreation   Recreation is not limited to sports and team events. It includes any activity that provides relaxation, interest, enjoyment, and exercise. Recreation provides an outlet for physical, mental, and social energy. It can give a sense of worth and achievement. It can help you stay healthy.    Mental Exercise and Social Involvement  Mental and emotional health is as important as physical health. Keep in touch with friends and family. Stay as active as possible. Continue to learn and challenge yourself.   Things you can do to stay mentally active are:    Learn something new, like a foreign language or musical instrument.     Play SCRABBLE or do crossword puzzles. If you cannot find people to play these games with you at home, you can play them with others on your computer through the Internet.     Join a games club--anything from card games to chess or checkers or lawn bowling.     Start a new hobby.     Go back to school.     Volunteer.     Read.   Keep up with world events.    Understanding USDA MyPlate  The USDA has guidelines to help you make healthy food choices. These are called MyPlate. MyPlate shows the food groups that make up healthy meals using the image of a place setting. Before you eat, think about the healthiest choices for what to put on your plate or in your cup or bowl. To learn more about building a healthy plate, visit www.choosemyplate.gov.    The food groups    Fruits. Any fruit or 100% fruit juice counts as part of the Fruit Group. Fruits may be fresh, canned, frozen, or dried, and may be whole, cut-up, or pureed. Make 1/2 of your plate fruits and vegetables.    Vegetables. Any vegetable or 100% vegetable juice counts as a member of the Vegetable  Group. Vegetables may be fresh, frozen, canned, or dried. They can be served raw or cooked and may be whole, cut-up, or mashed. Make 1/2 of your plate fruits and vegetables.    Grains. All foods made from grains are part of the Grains Group. These include wheat, rice, oats, cornmeal, and barley. Grains are often used to make foods such as bread, pasta, oatmeal, cereal, tortillas, and grits. Grains should be no more than 1/4 of your plate. At least half of your grains should be whole grains.    Protein. This group includes meat, poultry, seafood, beans and peas, eggs, processed soy products (such as tofu), nuts (including nut butters), and seeds. Make protein choices no more than 1/4 of your plate. Meat and poultry choices should be lean or low fat.    Dairy. The Dairy Group includes all fluid milk products and foods made from milk that contain calcium, such as yogurt and cheese. (Foods that have little calcium, such as cream, butter, and cream cheese, are not part of this group.) Most dairy choices should be low-fat or fat-free.    Oils. Oils aren't a food group, but they do contain essential nutrients. However it's important to watch your intake of oils. These are fats that are liquid at room temperature. They include canola, corn, olive, soybean, vegetable, and sunflower oil. Foods that are mainly oil include mayonnaise, certain salad dressings, and soft margarines. You likely already get your daily oil allowance from the foods you eat.  Things to limit  Eating healthy also means limiting these things in your diet:       Salt (sodium). Many processed foods have a lot of sodium. To keep sodium intake down, eat fresh vegetables, meats, poultry, and seafood when possible. Purchase low-sodium, reduced-sodium, or no-salt-added food products at the store. And don't add salt to your meals at home. Instead, season them with herbs and spices such as dill, oregano, cumin, and paprika. Or try adding flavor with lemon or lime  zest and juice.    Saturated fat. Saturated fats are most often found in animal products such as beef, pork, and chicken. They are often solid at room temperature, such as butter. To reduce your saturated fat intake, choose leaner cuts of meat and poultry. And try healthier cooking methods such as grilling, broiling, roasting, or baking. For a simple lower-fat swap, use plain nonfat yogurt instead of mayonnaise when making potato salad or macaroni salad.    Added sugars. These are sugars added to foods. They are in foods such as ice cream, candy, soda, fruit drinks, sports drinks, energy drinks, cookies, pastries, jams, and syrups. Cut down on added sugars by sharing sweet treats with a family member or friend. You can also choose fruit for dessert, and drink water or other unsweetened beverages.     [employer] last reviewed this educational content on 6/1/2020 2000-2021 [employer]. All rights reserved. This information is not intended as a substitute for professional medical care. Always follow your healthcare professional's instructions.        Your Health Risk Assessment indicates you feel you are not in good emotional health.    Recreation   Recreation is not limited to sports and team events. It includes any activity that provides relaxation, interest, enjoyment, and exercise. Recreation provides an outlet for physical, mental, and social energy. It can give a sense of worth and achievement. It can help you stay healthy.    Mental Exercise and Social Involvement  Mental and emotional health is as important as physical health. Keep in touch with friends and family. Stay as active as possible. Continue to learn and challenge yourself.   Things you can do to stay mentally active are:    Learn something new, like a foreign language or musical instrument.     Play SCRABBLE or do crossword puzzles. If you cannot find people to play these games with you at home, you can play them with others on your  computer through the Internet.     Join a games club--anything from card games to chess or checkers or lawn bowling.     Start a new hobby.     Go back to school.     Volunteer.     Read.   Keep up with world events.

## 2022-12-15 LAB
ALBUMIN SERPL-MCNC: 3.8 G/DL (ref 3.4–5)
ALP SERPL-CCNC: 96 U/L (ref 40–150)
ALT SERPL W P-5'-P-CCNC: 40 U/L (ref 0–70)
ANION GAP SERPL CALCULATED.3IONS-SCNC: 1 MMOL/L (ref 3–14)
AST SERPL W P-5'-P-CCNC: 26 U/L (ref 0–45)
BILIRUB SERPL-MCNC: 0.4 MG/DL (ref 0.2–1.3)
BUN SERPL-MCNC: 12 MG/DL (ref 7–30)
CALCIUM SERPL-MCNC: 9.5 MG/DL (ref 8.5–10.1)
CHLORIDE BLD-SCNC: 101 MMOL/L (ref 94–109)
CHOLEST SERPL-MCNC: 154 MG/DL
CO2 SERPL-SCNC: 31 MMOL/L (ref 20–32)
CREAT SERPL-MCNC: 0.81 MG/DL (ref 0.66–1.25)
FASTING STATUS PATIENT QL REPORTED: NORMAL
GFR SERPL CREATININE-BSD FRML MDRD: >90 ML/MIN/1.73M2
GLUCOSE BLD-MCNC: 201 MG/DL (ref 70–99)
HDLC SERPL-MCNC: 44 MG/DL
LDLC SERPL CALC-MCNC: 93 MG/DL
NONHDLC SERPL-MCNC: 110 MG/DL
POTASSIUM BLD-SCNC: 4.6 MMOL/L (ref 3.4–5.3)
PROT SERPL-MCNC: 7.9 G/DL (ref 6.8–8.8)
SODIUM SERPL-SCNC: 133 MMOL/L (ref 133–144)
TRIGL SERPL-MCNC: 86 MG/DL

## 2023-03-03 DIAGNOSIS — E11.9 TYPE 2 DIABETES MELLITUS WITHOUT COMPLICATION, WITH LONG-TERM CURRENT USE OF INSULIN (H): ICD-10-CM

## 2023-03-03 DIAGNOSIS — Z79.4 TYPE 2 DIABETES MELLITUS WITHOUT COMPLICATION, WITH LONG-TERM CURRENT USE OF INSULIN (H): ICD-10-CM

## 2023-03-05 RX ORDER — CARVEDILOL 25 MG/1
TABLET, FILM COATED ORAL
Qty: 400 STRIP | Refills: 3 | Status: SHIPPED | OUTPATIENT
Start: 2023-03-05

## 2023-03-08 NOTE — TELEPHONE ENCOUNTER
Forms/Letter Request    Type of form/letter: DM Test suppplies    Have you been seen for this request: Yes    Do we have the form/letter: Yes:      When is form/letter needed by: as soon as time permits    How would you like the form/letter returned: Fax to Caitlyn Tong @ [phone]    Patient Notified form requests are processed in 3-5 business days:No    Could we send this information to you in Skeed or would you prefer to receive a phone call?:   Patient would like to be contacted via Skeed

## 2023-06-02 ENCOUNTER — TELEPHONE (OUTPATIENT)
Dept: FAMILY MEDICINE | Facility: CLINIC | Age: 63
End: 2023-06-02
Payer: MEDICARE

## 2023-06-02 DIAGNOSIS — E11.9 TYPE 2 DIABETES MELLITUS WITHOUT COMPLICATION, WITH LONG-TERM CURRENT USE OF INSULIN (H): Primary | ICD-10-CM

## 2023-06-02 DIAGNOSIS — Z79.4 TYPE 2 DIABETES MELLITUS WITHOUT COMPLICATION, WITH LONG-TERM CURRENT USE OF INSULIN (H): Primary | ICD-10-CM

## 2023-06-02 RX ORDER — LANCETS
EACH MISCELLANEOUS
Qty: 100 EACH | Refills: 6 | Status: SHIPPED | OUTPATIENT
Start: 2023-06-02

## 2023-06-02 NOTE — TELEPHONE ENCOUNTER
Patient calling that his glucometer quit working. He contact the support number and they can send him a replacement within the next 1-2 weeks.   The glucometer he had is no longer available anymore (contour essentia).     He is wondering if PCP would be able to send a new glucometer to his pharmacy - [facility]    Patient would like a call back when this is done.     Leonora DUTTA, RN  [facility]

## 2023-06-14 ENCOUNTER — OFFICE VISIT (OUTPATIENT)
Dept: FAMILY MEDICINE | Facility: CLINIC | Age: 63
End: 2023-06-14
Attending: FAMILY MEDICINE
Payer: MEDICARE

## 2023-06-14 VITALS
OXYGEN SATURATION: 100 % | DIASTOLIC BLOOD PRESSURE: 68 MMHG | BODY MASS INDEX: 24.18 KG/M2 | RESPIRATION RATE: 15 BRPM | WEIGHT: 188.4 LBS | TEMPERATURE: 97.8 F | SYSTOLIC BLOOD PRESSURE: 132 MMHG | HEIGHT: 74 IN | HEART RATE: 71 BPM

## 2023-06-14 DIAGNOSIS — Z79.4 TYPE 2 DIABETES MELLITUS WITHOUT COMPLICATION, WITH LONG-TERM CURRENT USE OF INSULIN (H): Primary | ICD-10-CM

## 2023-06-14 DIAGNOSIS — E11.9 TYPE 2 DIABETES MELLITUS WITHOUT COMPLICATION, WITH LONG-TERM CURRENT USE OF INSULIN (H): Primary | ICD-10-CM

## 2023-06-14 DIAGNOSIS — G35 MULTIPLE SCLEROSIS (H): ICD-10-CM

## 2023-06-14 DIAGNOSIS — C41.9 CHONDROSARCOMA (H): ICD-10-CM

## 2023-06-14 DIAGNOSIS — E78.5 HYPERLIPIDEMIA LDL GOAL <100: ICD-10-CM

## 2023-06-14 DIAGNOSIS — D17.30 LIPOMA OF SKIN AND SUBCUTANEOUS TISSUE: ICD-10-CM

## 2023-06-14 DIAGNOSIS — Z12.11 SCREEN FOR COLON CANCER: ICD-10-CM

## 2023-06-14 LAB — HBA1C MFR BLD: 7.1 % (ref 0–5.6)

## 2023-06-14 PROCEDURE — 83036 HEMOGLOBIN GLYCOSYLATED A1C: CPT | Performed by: FAMILY MEDICINE

## 2023-06-14 PROCEDURE — 99214 OFFICE O/P EST MOD 30 MIN: CPT | Performed by: FAMILY MEDICINE

## 2023-06-14 PROCEDURE — 82043 UR ALBUMIN QUANTITATIVE: CPT | Performed by: FAMILY MEDICINE

## 2023-06-14 PROCEDURE — 36415 COLL VENOUS BLD VENIPUNCTURE: CPT | Performed by: FAMILY MEDICINE

## 2023-06-14 PROCEDURE — 82570 ASSAY OF URINE CREATININE: CPT | Performed by: FAMILY MEDICINE

## 2023-06-14 RX ORDER — INSULIN GLARGINE 100 [IU]/ML
12 INJECTION, SOLUTION SUBCUTANEOUS AT BEDTIME
Qty: 15 ML | Refills: 1 | Status: SHIPPED | OUTPATIENT
Start: 2023-06-14 | End: 2024-03-05

## 2023-06-14 ASSESSMENT — PAIN SCALES - GENERAL: PAINLEVEL: NO PAIN (0)

## 2023-06-14 NOTE — PROGRESS NOTES
Assessment & Plan     Type 2 diabetes mellitus without complication, with long-term current use of insulin (H)  Awaiting results. Dose adjustment as needed.    - HEMOGLOBIN A1C; Future  - insulin glargine (LANTUS SOLOSTAR) 100 UNIT/ML pen; Inject 12 Units Subcutaneous At Bedtime  - Albumin Random Urine Quantitative with Creat Ratio; Future    Multiple sclerosis (H)  Doing well. No concerns.   No changes.     Chondrosarcoma (H)  Doing well. No concerns.   No changes.     Hyperlipidemia LDL goal <100  Not needing recheck today.   No issues with medication.   Recheck in 6 months.     Lipoma of skin and subcutaneous tissue  Lipoma right upper back.   Causing some discomfort.   Referral placed to general surgery.   - Adult General Surg Referral; Future    Screen for colon cancer  Due for recheck of FIT.   FIT cards given.   - Fecal colorectal cancer screen FIT - Future (S+30); Future           Blood sugar testing frequency justification:  Uncontrolled diabetes      Melita Painter MD  [facility] SAI Camejo is a 62 year old, presenting for the following health issues:  Diabetes        6/14/2023     3:36 PM   Additional Questions   Roomed by Trupti DESIR   Accompanied by None     History of Present Illness       Diabetes:   He presents for follow up of diabetes.  He is checking home blood glucose three times daily. He checks blood glucose before meals.  Blood glucose is sometimes over 200 and sometimes under 70. He is aware of hypoglycemia symptoms including shakiness, weakness, lethargy and confusion. He is concerned about blood sugar frequently over 200.  He is having numbness in feet and excessive thirst.        Would like lump on back examined. Denied pain and growth. Is worried about fatty tumors due to history.     Diabetes Follow-up    How often are you checking your blood sugar? Four or more times daily  Blood sugar testing frequency justification:  Uncontrolled diabetes  What time  "of day are you checking your blood sugars (select all that apply)?  Before and after meals  Have you had any blood sugars above 200?  No  Have you had any blood sugars below 70?  No    What symptoms do you notice when your blood sugar is low?  None    What concerns do you have today about your diabetes? None        BP Readings from Last 2 Encounters:   06/14/23 132/68   12/14/22 136/72     Hemoglobin A1C (%)   Date Value   12/14/2022 7.0 (H)   06/08/2022 6.7 (H)   05/12/2021 7.2 (H)   09/30/2020 6.9 (H)     LDL Cholesterol Calculated (mg/dL)   Date Value   12/14/2022 93   09/22/2021 82   09/30/2020 96   10/29/2019 98           Hyperlipidemia Follow-Up      Are you regularly taking any medication or supplement to lower your cholesterol?   Yes- simvastatin    Are you having muscle aches or other side effects that you think could be caused by your cholesterol lowering medication?  No      LUMP ON BACK - noted for the last couple weeks. Feels like a fatty tumor he has had issues with in the past. Feels uncomfortable again.     CHONDROSARCOMA - no new concerns.     MULTIPLE SCLEROSIS - has been stable.           Review of Systems   CONSTITUTIONAL: NEGATIVE for fever, chills, change in weight  ENT/MOUTH: NEGATIVE for ear, mouth and throat problems  RESP: NEGATIVE for significant cough or SOB  CV: NEGATIVE for chest pain, palpitations or peripheral edema      Objective    /68 (BP Location: Left arm, Patient Position: Chair, Cuff Size: Adult Regular)   Pulse 71   Temp 97.8  F (36.6  C) (Temporal)   Resp 15   Ht 1.88 m (6' 2.02\")   Wt 85.5 kg (188 lb 6.4 oz)   SpO2 100%   BMI 24.18 kg/m    Body mass index is 24.18 kg/m .  Physical Exam   GENERAL: healthy, alert and no distress  NECK: no adenopathy, no asymmetry, masses, or scars and thyroid normal to palpation  RESP: lungs clear to auscultation - no rales, rhonchi or wheezes  CV: regular rate and rhythm, normal S1 S2, no S3 or S4, no murmur, click or rub, no " peripheral edema and peripheral pulses strong  MS: no gross musculoskeletal defects noted, no edema

## 2023-06-15 LAB
CREAT UR-MCNC: 19.1 MG/DL
MICROALBUMIN UR-MCNC: <12 MG/L
MICROALBUMIN/CREAT UR: NORMAL MG/G{CREAT}

## 2023-06-20 ENCOUNTER — TELEPHONE (OUTPATIENT)
Dept: FAMILY MEDICINE | Facility: CLINIC | Age: 63
End: 2023-06-20
Payer: MEDICARE

## 2023-06-20 DIAGNOSIS — E11.9 TYPE 2 DIABETES MELLITUS WITHOUT COMPLICATION, WITH LONG-TERM CURRENT USE OF INSULIN (H): Primary | ICD-10-CM

## 2023-06-20 DIAGNOSIS — Z79.4 TYPE 2 DIABETES MELLITUS WITHOUT COMPLICATION, WITH LONG-TERM CURRENT USE OF INSULIN (H): Primary | ICD-10-CM

## 2023-06-20 PROCEDURE — 82274 ASSAY TEST FOR BLOOD FECAL: CPT | Performed by: FAMILY MEDICINE

## 2023-06-20 NOTE — TELEPHONE ENCOUNTER
Please notify of unread result notes:    Ulysses Camejo,     Your A1C did not really change from last check.       We could try having you increase the Lantus another 2-3 units per day.  Could also consider seeing diabetes education and have them dig into diet and plan a bit more.      Let me know what you think.      MD Ulysses Lord,     The urine results are fine.      Please let me know what questions you have.      Melita Painter MD

## 2023-06-20 NOTE — TELEPHONE ENCOUNTER
"Placed call to patient and advised of the below note from provider. Patient in interested in talking with a diabetes educator regarding a plan and diet/lifestyle modifications. Patient does not desire to increase Lantus at this time. Patient reports he sometimes wakes up with a lower blood sugar at night so does not want to increase Lantus at this time. Patient reports he wants to maintain where he is at and focus on other modifications. Patient is concerned because his blood sugars used to be \"under good control for years and now in recent years it hasn't been.\"    RN advised patient she would pass on message to provider and someone would reach out to him to set up an appointment with diabetes education once the order is placed.     LUZMARIA Alexandre, RN  [facility] ~ Registered Nurse  Clinic Triage ~ [place] [facility]  June 20, 2023    "

## 2023-06-22 LAB — HEMOCCULT STL QL IA: NEGATIVE

## 2023-07-29 DIAGNOSIS — E11.9 TYPE 2 DIABETES MELLITUS WITHOUT COMPLICATION, WITH LONG-TERM CURRENT USE OF INSULIN (H): ICD-10-CM

## 2023-07-29 DIAGNOSIS — Z79.4 TYPE 2 DIABETES MELLITUS WITHOUT COMPLICATION, WITH LONG-TERM CURRENT USE OF INSULIN (H): ICD-10-CM

## 2023-07-29 DIAGNOSIS — E78.5 HYPERLIPIDEMIA LDL GOAL <100: ICD-10-CM

## 2023-07-31 RX ORDER — LOSARTAN POTASSIUM 25 MG/1
TABLET ORAL
Qty: 90 TABLET | Refills: 3 | Status: SHIPPED | OUTPATIENT
Start: 2023-07-31 | End: 2024-07-24

## 2023-07-31 RX ORDER — FLURBIPROFEN SODIUM 0.3 MG/ML
SOLUTION/ DROPS OPHTHALMIC
Qty: 100 EACH | Refills: 11 | Status: SHIPPED | OUTPATIENT
Start: 2023-07-31 | End: 2024-03-05

## 2023-07-31 RX ORDER — SIMVASTATIN 80 MG
TABLET ORAL
Qty: 45 TABLET | Refills: 3 | Status: SHIPPED | OUTPATIENT
Start: 2023-07-31 | End: 2024-07-24

## 2023-07-31 NOTE — TELEPHONE ENCOUNTER
"Pending Prescriptions:                       Disp   Refills    simvastatin (ZOCOR) 80 MG tablet [Pharmacy*45 tab*3        Sig: TAKE 1/2 TABLET BY MOUTH DAILY    losartan (COZAAR) 25 MG tablet [Pharmacy M*90 tab*3        Sig: TAKE 1 TABLET(25 MG) BY MOUTH DAILY    B-D U/F insulin pen needle [Pharmacy Med N*                Sig: USED FOUR TIMES DAILY WITH NOVOLOG AND LANTUS PENS    Routing refill request to provider for review/approval because:  A break in medication    PHQ-9 score:         No data to display                Requested Prescriptions   Pending Prescriptions Disp Refills    simvastatin (ZOCOR) 80 MG tablet [Pharmacy Med Name: SIMVASTATIN 80MG TABLETS] 45 tablet 3     Sig: TAKE 1/2 TABLET BY MOUTH DAILY       Statins Protocol Passed - 7/29/2023  1:15 PM        Passed - LDL on file in past 12 months     Recent Labs   Lab Test 12/14/22  1532   LDL 93             Passed - No abnormal creatine kinase in past 12 months     No lab results found.             Passed - Recent (12 mo) or future (30 days) visit within the authorizing provider's specialty     Patient has had an office visit with the authorizing provider or a provider within the authorizing providers department within the previous 12 mos or has a future within next 30 days. See \"Patient Info\" tab in inbasket, or \"Choose Columns\" in Meds & Orders section of the refill encounter.              Passed - Medication is active on med list        Passed - Patient is age 18 or older          losartan (COZAAR) 25 MG tablet [Pharmacy Med Name: LOSARTAN 25MG TABLETS] 90 tablet 3     Sig: TAKE 1 TABLET(25 MG) BY MOUTH DAILY       Angiotensin-II Receptors Passed - 7/29/2023  1:15 PM        Passed - Last blood pressure under 140/90 in past 12 months     BP Readings from Last 3 Encounters:   06/14/23 132/68   12/14/22 136/72   06/08/22 132/62                 Passed - Recent (12 mo) or future (30 days) visit within the authorizing provider's specialty     Patient has " "had an office visit with the authorizing provider or a provider within the authorizing providers department within the previous 12 mos or has a future within next 30 days. See \"Patient Info\" tab in inbasket, or \"Choose Columns\" in Meds & Orders section of the refill encounter.              Passed - Medication is active on med list        Passed - Patient is age 18 or older        Passed - Normal serum creatinine on file in past 12 months     Recent Labs   Lab Test 12/14/22  1532   CR 0.81       Ok to refill medication if creatinine is low          Passed - Normal serum potassium on file in past 12 months     Recent Labs   Lab Test 12/14/22  1532   POTASSIUM 4.6                      B-D U/F insulin pen needle [Pharmacy Med Name: B-D PEN NDL MINI [id number](3/16)PRPL]       Sig: USED FOUR TIMES DAILY WITH NOVOLOG AND LANTUS PENS       Diabetic Supplies Protocol Passed - 7/29/2023  1:15 PM        Passed - Medication is active on med list        Passed - Patient is 18 years of age or older        Passed - Recent (6 mo) or future (30 days) visit within the authorizing provider's specialty     Patient had office visit in the last 6 months or has a visit in the next 30 days with authorizing provider.  See \"Patient Info\" tab in inbasket, or \"Choose Columns\" in Meds & Orders section of the refill encounter.                    Kim Hernandez RN on 7/31/2023 at 9:36 AM    "

## 2023-08-24 ENCOUNTER — TRANSFERRED RECORDS (OUTPATIENT)
Dept: HEALTH INFORMATION MANAGEMENT | Facility: CLINIC | Age: 63
End: 2023-08-24
Payer: MEDICARE

## 2023-08-24 LAB — RETINOPATHY: NEGATIVE

## 2023-09-27 ENCOUNTER — OFFICE VISIT (OUTPATIENT)
Dept: SURGERY | Facility: CLINIC | Age: 63
End: 2023-09-27
Payer: MEDICARE

## 2023-09-27 VITALS — SYSTOLIC BLOOD PRESSURE: 158 MMHG | DIASTOLIC BLOOD PRESSURE: 71 MMHG

## 2023-09-27 DIAGNOSIS — D17.1 LIPOMA OF BACK: Primary | ICD-10-CM

## 2023-09-27 PROCEDURE — 99203 OFFICE O/P NEW LOW 30 MIN: CPT | Performed by: SURGERY

## 2023-09-27 NOTE — LETTER
"    9/27/2023         RE: Aden Vaz  [address]  [address]  [facility] [address]        Dear Colleague,    Thank you for referring your patient, Aden Vaz, to the [facility]. Please see a copy of my visit note below.    Patient seen in consultation for lipoma by Melita Painter    HPI:  Patient is a 63 year old male  with complaints of lipoma on back  The patient noticed the symptoms about at least 1 year ago.    Has been keeping an eye on it but started getting uncomforatble to lay on and has episode of sharper pain as well. Slow enlargement over time  History of lipoma right shoulder removed in 2011 and one on left forearm. Has some small ones on the forearms now but don't bother him.  Patient has family history of similar problems in son    Review Of Systems    Skin: as above. Some smaller on forearms  Ears/Nose/Throat: negative  Respiratory: No shortness of breath, dyspnea on exertion, cough, or hemoptysis  Cardiovascular: negative  Gastrointestinal: negative  Genitourinary: negative  Musculoskeletal: uses wheelchair, history of pelvic osteosarcoma  Neurologic: ms  Hematologic/Lymphatic/Immunologic: negative  Endocrine: diabetes      Past Medical History:   Diagnosis Date     Arthritis      Diabetic eye exam (H) 06/09/2014    [facility]     History of blood transfusion      Malignant neoplasm (H)      Multiple sclerosis (H)     \"in remission\"      Osteosarcoma of pelvic bone (H)      Raynaud's disease without gangrene 08/05/2014     Type II or unspecified type diabetes mellitus without mention of complication, not stated as uncontrolled     Diabetes mellitus       Past Surgical History:   Procedure Laterality Date     ARTHROPLASTY REVISION HIP Left 9/25/2015    Procedure: ARTHROPLASTY REVISION HIP;  Surgeon: Payam Gage MD;  Location: [facility]     BIOPSY  2003     COMBINED CYSTOSCOPY, INSERT STENT URETER(S) Left 9/25/2015    Procedure: COMBINED " CYSTOSCOPY, INSERT STENT URETER(S);  Surgeon: Jass King MD;  [facility]: [facility]     OPTICAL TRACKING SYSTEM OPEN REDUCTION INTERNAL FIXATION PELVIS Left 2015    Procedure: OPTICAL TRACKING SYSTEM OPEN REDUCTION INTERNAL FIXATION PELVIS;  Surgeon: Payam Gage MD;  [facility]     ORTHOPEDIC SURGERY       PAST SURGICAL HISTORY      several pelvic surgeries for osteosarcoma     RELEASE CARPAL TUNNEL  2012    Procedure: RELEASE CARPAL TUNNEL;  Right carpal tunnel release;  Surgeon: Mery Link MD;  Location: [facility]     SOFT TISSUE SURGERY       [facility]C HAND/FINGER SURGERY UNLISTED       Z PELVIS/HIP JOINT SURGERY UNLISTED         Social History     Socioeconomic History     Marital status:      Spouse name: Not on file     Number of children: Not on file     Years of education: Not on file     Highest education level: Not on file   Occupational History     Not on file   Tobacco Use     Smoking status: Former     Packs/day: 1.50     Years: 30.00     Pack years: 45.00     Types: Cigarettes     Quit date: 12/10/2002     Years since quittin.8     Smokeless tobacco: Never   Vaping Use     Vaping Use: Never used   Substance and Sexual Activity     Alcohol use: No     Comment: Sober for 31 years     Drug use: No     Sexual activity: Not Currently     Partners: Female     Birth control/protection: None     Comment: not currently   Other Topics Concern     Parent/sibling w/ CABG, MI or angioplasty before 65F 55M? No   Social History Narrative     Not on file     Social Determinants of Health     Financial Resource Strain: Not on file   Food Insecurity: Not on file   Transportation Needs: Not on file   Physical Activity: Not on file   Stress: Not on file   Social Connections: Not on file   Interpersonal Safety: Not on file   Housing Stability: Not on file       Current Outpatient Medications   Medication Sig Dispense Refill     alcohol swab prep pads Use to swab area of  injection/jorge as directed. 100 each 3     amoxicillin (AMOXIL) 500 MG tablet Amoxicillin: Patient to take 4 tabs (2 grams) by mouth 1 hour prior to dental appointment. 4 tablet 3     aspirin 81 MG tablet Take  by mouth daily.  3     blood glucose (CONTOUR TEST) test strip USE TO TEST BLOOD SUGAR FOUR TIMES DAILY 400 strip 3     blood glucose (NO BRAND SPECIFIED) test strip Use to test blood sugar 4 times daily or as directed. To accompany: Blood Glucose Monitor Brands: per insurance. 100 strip 6     blood glucose monitoring (NO BRAND SPECIFIED) meter device kit Use to test blood sugar 4 times daily or as directed. Preferred blood glucose meter OR supplies to accompany: Blood Glucose Monitor Brands: per insurance. 1 kit 0     CALCIUM + D 600-200 MG-UNIT PO TABS 1 Tablet daily       Insulin Aspart FlexPen 100 UNIT/ML SOPN INJECT 6 UNITS WITH BREAKFAST, 4 UNITS AT LUNCH AND 10 UNITS AT DINNER 30 mL 3     insulin glargine (LANTUS SOLOSTAR) 100 UNIT/ML pen Inject 12 Units Subcutaneous At Bedtime 15 mL 1     insulin pen needle (B-D U/F) 31G X 5 MM miscellaneous USED FOUR TIMES DAILY WITH NOVOLOG AND LANTUS PENS 100 each 11     losartan (COZAAR) 25 MG tablet TAKE 1 TABLET(25 MG) BY MOUTH DAILY 90 tablet 3     ORDER FOR DME Crutch, forearm 1 Units 0     simvastatin (ZOCOR) 80 MG tablet TAKE 1/2 TABLET BY MOUTH DAILY 45 tablet 3     thin (NO BRAND SPECIFIED) lancets Use with lanceting device. To accompany: Blood Glucose Monitor Brands: per insurance. 100 each 6     VITAMIN D3 2000 UNIT PO CAPS 1 CAPSULE DAILY         Medications and history reviewed    Physical exam:  Vitals: BP (!) 158/71   BMI= There is no height or weight on file to calculate BMI.    Constitutional: healthy, alert, and no distress. Wheel chair  Head: Normocephalic. No masses, lesions, tenderness or abnormalities  Skin: Upper mid back with approximate 4 cm mobile subcutaneous mass consistent with lipoma.  Multiple approximately 1 cm lipomas right forearm  and diffuse similar size on the left along with the scar from previous excision.  Psychiatric: mentation appears normal and affect normal/bright      Assessment:     ICD-10-CM    1. Lipoma of back  D17.1         Plan: We will plan for excision of the lipoma due to the symptoms and can be done in clinic under local.  Patient agreeable so we will work on finding a time for him.    Jairo Perry MD      Again, thank you for allowing me to participate in the care of your patient.        Sincerely,        Jairo Perry MD

## 2023-09-27 NOTE — PROGRESS NOTES
"Patient seen in consultation for lipoma by Melita Painter    HPI:  Patient is a 63 year old male  with complaints of lipoma on back  The patient noticed the symptoms about at least 1 year ago.    Has been keeping an eye on it but started getting uncomforatble to lay on and has episode of sharper pain as well. Slow enlargement over time  History of lipoma right shoulder removed in 2011 and one on left forearm. Has some small ones on the forearms now but don't bother him.  Patient has family history of similar problems in son    Review Of Systems    Skin: as above. Some smaller on forearms  Ears/Nose/Throat: negative  Respiratory: No shortness of breath, dyspnea on exertion, cough, or hemoptysis  Cardiovascular: negative  Gastrointestinal: negative  Genitourinary: negative  Musculoskeletal: uses wheelchair, history of pelvic osteosarcoma  Neurologic: ms  Hematologic/Lymphatic/Immunologic: negative  Endocrine: diabetes      Past Medical History:   Diagnosis Date    Arthritis     Diabetic eye exam (H) 06/09/2014    [facility]    History of blood transfusion     Malignant neoplasm (H)     Multiple sclerosis (H)     \"in remission\"     Osteosarcoma of pelvic bone (H)     Raynaud's disease without gangrene 08/05/2014    Type II or unspecified type diabetes mellitus without mention of complication, not stated as uncontrolled     Diabetes mellitus       Past Surgical History:   Procedure Laterality Date    ARTHROPLASTY REVISION HIP Left 9/25/2015    Procedure: ARTHROPLASTY REVISION HIP;  Surgeon: Payam Gage MD;  [facility]    BIOPSY  2003    COMBINED CYSTOSCOPY, INSERT STENT URETER(S) Left 9/25/2015    Procedure: COMBINED CYSTOSCOPY, INSERT STENT URETER(S);  Surgeon: Jass King MD;  [facility]    OPTICAL TRACKING SYSTEM OPEN REDUCTION INTERNAL FIXATION PELVIS Left 9/25/2015    Procedure: OPTICAL TRACKING SYSTEM OPEN REDUCTION INTERNAL FIXATION PELVIS;  Surgeon: Too, " Payam WOODSON MD;  [facility]    ORTHOPEDIC SURGERY      PAST SURGICAL HISTORY      several pelvic surgeries for osteosarcoma    RELEASE CARPAL TUNNEL  2012    Procedure: RELEASE CARPAL TUNNEL;  Right carpal tunnel release;  Surgeon: Mery Link MD;  Location: [facility]    SOFT TISSUE SURGERY      [facility] HAND/FINGER SURGERY UNLISTED      [facility] PELVIS/HIP JOINT SURGERY UNLISTED         Social History     Socioeconomic History    Marital status:      Spouse name: Not on file    Number of children: Not on file    Years of education: Not on file    Highest education level: Not on file   Occupational History    Not on file   Tobacco Use    Smoking status: Former     Packs/day: 1.50     Years: 30.00     Pack years: 45.00     Types: Cigarettes     Quit date: 12/10/2002     Years since quittin.8    Smokeless tobacco: Never   Vaping Use    Vaping Use: Never used   Substance and Sexual Activity    Alcohol use: No     Comment: Sober for 31 years    Drug use: No    Sexual activity: Not Currently     Partners: Female     Birth control/protection: None     Comment: not currently   Other Topics Concern    Parent/sibling w/ CABG, MI or angioplasty before 65F 55M? No   Social History Narrative    Not on file     Social Determinants of Health     Financial Resource Strain: Not on file   Food Insecurity: Not on file   Transportation Needs: Not on file   Physical Activity: Not on file   Stress: Not on file   Social Connections: Not on file   Interpersonal Safety: Not on file   Housing Stability: Not on file       Current Outpatient Medications   Medication Sig Dispense Refill    alcohol swab prep pads Use to swab area of injection/jorge as directed. 100 each 3    amoxicillin (AMOXIL) 500 MG tablet Amoxicillin: Patient to take 4 tabs (2 grams) by mouth 1 hour prior to dental appointment. 4 tablet 3    aspirin 81 MG tablet Take  by mouth daily.  3    blood glucose (CONTOUR TEST) test strip USE TO TEST BLOOD SUGAR FOUR TIMES  DAILY 400 strip 3    blood glucose (NO BRAND SPECIFIED) test strip Use to test blood sugar 4 times daily or as directed. To accompany: Blood Glucose Monitor Brands: per insurance. 100 strip 6    blood glucose monitoring (NO BRAND SPECIFIED) meter device kit Use to test blood sugar 4 times daily or as directed. Preferred blood glucose meter OR supplies to accompany: Blood Glucose Monitor Brands: per insurance. 1 kit 0    CALCIUM + D 600-200 MG-UNIT PO TABS 1 Tablet daily      Insulin Aspart FlexPen 100 UNIT/ML SOPN INJECT 6 UNITS WITH BREAKFAST, 4 UNITS AT LUNCH AND 10 UNITS AT DINNER 30 mL 3    insulin glargine (LANTUS SOLOSTAR) 100 UNIT/ML pen Inject 12 Units Subcutaneous At Bedtime 15 mL 1    insulin pen needle (B-D U/F) 31G X 5 MM miscellaneous USED FOUR TIMES DAILY WITH NOVOLOG AND LANTUS PENS 100 each 11    losartan (COZAAR) 25 MG tablet TAKE 1 TABLET(25 MG) BY MOUTH DAILY 90 tablet 3    ORDER FOR DME Crutch, forearm 1 Units 0    simvastatin (ZOCOR) 80 MG tablet TAKE 1/2 TABLET BY MOUTH DAILY 45 tablet 3    thin (NO BRAND SPECIFIED) lancets Use with lanceting device. To accompany: Blood Glucose Monitor Brands: per insurance. 100 each 6    VITAMIN D3 2000 UNIT PO CAPS 1 CAPSULE DAILY         Medications and history reviewed    Physical exam:  Vitals: BP (!) 158/71   BMI= There is no height or weight on file to calculate BMI.    Constitutional: healthy, alert, and no distress. Wheel chair  Head: Normocephalic. No masses, lesions, tenderness or abnormalities  Skin: Upper mid back with approximate 4 cm mobile subcutaneous mass consistent with lipoma.  Multiple approximately 1 cm lipomas right forearm and diffuse similar size on the left along with the scar from previous excision.  Psychiatric: mentation appears normal and affect normal/bright      Assessment:     ICD-10-CM    1. Lipoma of back  D17.1         Plan: We will plan for excision of the lipoma due to the symptoms and can be done in clinic under local.   Patient agreeable so we will work on finding a time for him.    Jairo Perry MD

## 2023-10-23 ENCOUNTER — TELEPHONE (OUTPATIENT)
Dept: FAMILY MEDICINE | Facility: CLINIC | Age: 63
End: 2023-10-23
Payer: MEDICARE

## 2023-10-23 NOTE — TELEPHONE ENCOUNTER
Forms/Letter Request    Type of form/letter:  Diabetic testing supplies    Have you been seen for this request: N/A    Do we have the form/letter: Yes: placed informs bin    Who is the form from? [facility] (if other please explain)    Where did/will the form come from? form was faxed in    When is form/letter needed by:     How would you like the form/letter returned: Fax : [phone]    Patient Notified form requests are processed in 3-5 business days:No

## 2023-12-13 ENCOUNTER — OFFICE VISIT (OUTPATIENT)
Dept: SURGERY | Facility: CLINIC | Age: 63
End: 2023-12-13
Payer: MEDICARE

## 2023-12-13 DIAGNOSIS — D17.1 LIPOMA OF BACK: Primary | ICD-10-CM

## 2023-12-13 PROCEDURE — 21931 EXC BACK LES SC 3 CM/>: CPT | Performed by: SURGERY

## 2023-12-13 PROCEDURE — 88304 TISSUE EXAM BY PATHOLOGIST: CPT | Performed by: PATHOLOGY

## 2023-12-13 NOTE — LETTER
12/13/2023         RE: Aden Vaz  [address]  [address]  [facility] [address]        Dear Colleague,    Thank you for referring your patient, Aden Vaz, to the [facility]. Please see a copy of my visit note below.    PROCEDURE:   Written consent was obtained  The upper mid back area was prepped and appropriately anesthetized with 1% lidocaine with epinephrine.  I made a 4 cm transverse incision with #15 blade over the palpable lipoma.  Then dissected down into the subcutaneous tissues to the lipoma which was firmly encapsulated.  I was able to free this with a combination of sharp iris dissection and scalpel until removed in total. The total area excised, including lesion and margins was 4 cm.  Closure was accomplished with interrupted 3-0 vicryl for the deep subcutaneous layer and running subcuticular 4-0 vicryl for the skin.  Incision was covered with dermabond..  The procedure was well tolerated and without complications. Specimen was sent to Pathology.      Jairo Perry MD      Again, thank you for allowing me to participate in the care of your patient.        Sincerely,        Jairo Perry MD

## 2023-12-13 NOTE — PROGRESS NOTES
PROCEDURE:   Written consent was obtained  The upper mid back area was prepped and appropriately anesthetized with 1% lidocaine with epinephrine.  I made a 4 cm transverse incision with #15 blade over the palpable lipoma.  Then dissected down into the subcutaneous tissues to the lipoma which was firmly encapsulated.  I was able to free this with a combination of sharp iris dissection and scalpel until removed in total. The total area excised, including lesion and margins was 4 cm.  Closure was accomplished with interrupted 3-0 vicryl for the deep subcutaneous layer and running subcuticular 4-0 vicryl for the skin.  Incision was covered with dermabond..  The procedure was well tolerated and without complications. Specimen was sent to Pathology.      Jairo Perry MD

## 2023-12-18 LAB
PATH REPORT.COMMENTS IMP SPEC: NORMAL
PATH REPORT.COMMENTS IMP SPEC: NORMAL
PATH REPORT.FINAL DX SPEC: NORMAL
PATH REPORT.GROSS SPEC: NORMAL
PATH REPORT.MICROSCOPIC SPEC OTHER STN: NORMAL
PATH REPORT.RELEVANT HX SPEC: NORMAL
PHOTO IMAGE: NORMAL

## 2024-01-24 ASSESSMENT — ENCOUNTER SYMPTOMS
SORE THROAT: 0
HEMATOCHEZIA: 0
MYALGIAS: 1
PALPITATIONS: 0
SHORTNESS OF BREATH: 0
HEMATURIA: 0
COUGH: 0
HEARTBURN: 0
DYSURIA: 0
NAUSEA: 0
ABDOMINAL PAIN: 0
CONSTIPATION: 0
WEAKNESS: 0
PARESTHESIAS: 0
DIARRHEA: 0
EYE PAIN: 0
FEVER: 0
ARTHRALGIAS: 1
CHILLS: 0
FREQUENCY: 1
DIZZINESS: 0
HEADACHES: 0
NERVOUS/ANXIOUS: 1
JOINT SWELLING: 0

## 2024-01-24 ASSESSMENT — ACTIVITIES OF DAILY LIVING (ADL): CURRENT_FUNCTION: NO ASSISTANCE NEEDED

## 2024-01-30 ENCOUNTER — OFFICE VISIT (OUTPATIENT)
Dept: FAMILY MEDICINE | Facility: CLINIC | Age: 64
End: 2024-01-30
Payer: MEDICARE

## 2024-01-30 VITALS
WEIGHT: 197.2 LBS | TEMPERATURE: 98 F | SYSTOLIC BLOOD PRESSURE: 126 MMHG | DIASTOLIC BLOOD PRESSURE: 72 MMHG | HEIGHT: 74 IN | OXYGEN SATURATION: 99 % | HEART RATE: 77 BPM | BODY MASS INDEX: 25.31 KG/M2

## 2024-01-30 DIAGNOSIS — E78.5 HYPERLIPIDEMIA LDL GOAL <100: ICD-10-CM

## 2024-01-30 DIAGNOSIS — E11.9 TYPE 2 DIABETES MELLITUS WITHOUT COMPLICATION, WITH LONG-TERM CURRENT USE OF INSULIN (H): ICD-10-CM

## 2024-01-30 DIAGNOSIS — G35 MULTIPLE SCLEROSIS (H): ICD-10-CM

## 2024-01-30 DIAGNOSIS — Z79.4 TYPE 2 DIABETES MELLITUS WITHOUT COMPLICATION, WITH LONG-TERM CURRENT USE OF INSULIN (H): ICD-10-CM

## 2024-01-30 DIAGNOSIS — Z00.00 ENCOUNTER FOR MEDICARE ANNUAL WELLNESS EXAM: Primary | ICD-10-CM

## 2024-01-30 DIAGNOSIS — C41.9 CHONDROSARCOMA (H): ICD-10-CM

## 2024-01-30 LAB — HBA1C MFR BLD: 7.6 % (ref 0–5.6)

## 2024-01-30 PROCEDURE — G0439 PPPS, SUBSEQ VISIT: HCPCS | Performed by: FAMILY MEDICINE

## 2024-01-30 PROCEDURE — 80053 COMPREHEN METABOLIC PANEL: CPT | Performed by: FAMILY MEDICINE

## 2024-01-30 PROCEDURE — 80061 LIPID PANEL: CPT | Performed by: FAMILY MEDICINE

## 2024-01-30 PROCEDURE — 36415 COLL VENOUS BLD VENIPUNCTURE: CPT | Performed by: FAMILY MEDICINE

## 2024-01-30 PROCEDURE — 99214 OFFICE O/P EST MOD 30 MIN: CPT | Mod: 24 | Performed by: FAMILY MEDICINE

## 2024-01-30 PROCEDURE — 83036 HEMOGLOBIN GLYCOSYLATED A1C: CPT | Performed by: FAMILY MEDICINE

## 2024-01-30 RX ORDER — INSULIN LISPRO 100 [IU]/ML
9 INJECTION, SOLUTION INTRAVENOUS; SUBCUTANEOUS
Qty: 30 ML | Refills: 3 | Status: SHIPPED | OUTPATIENT
Start: 2024-01-30 | End: 2024-03-06

## 2024-01-30 ASSESSMENT — ENCOUNTER SYMPTOMS
FEVER: 0
ABDOMINAL PAIN: 0
FREQUENCY: 1
NAUSEA: 0
DIARRHEA: 0
SORE THROAT: 0
MYALGIAS: 1
SHORTNESS OF BREATH: 0
DIZZINESS: 0
ARTHRALGIAS: 1
CHILLS: 0
WEAKNESS: 0
NERVOUS/ANXIOUS: 1
CONSTIPATION: 0
JOINT SWELLING: 0
PALPITATIONS: 0
HEADACHES: 0
DYSURIA: 0
COUGH: 0
HEMATURIA: 0
EYE PAIN: 0

## 2024-01-30 ASSESSMENT — PAIN SCALES - GENERAL: PAINLEVEL: NO PAIN (0)

## 2024-01-30 ASSESSMENT — ACTIVITIES OF DAILY LIVING (ADL): CURRENT_FUNCTION: NO ASSISTANCE NEEDED

## 2024-01-30 NOTE — PROGRESS NOTES
"Preventive Care Visit  [facility] SAI Painter MD, Family Medicine  Jan 30, 2024      SUBJECTIVE:   Bashir is a 63 year old, presenting for the following:  Medicare Visit        1/30/2024     2:02 PM   Additional Questions   Roomed by Trupti DESIR   Accompanied by None         1/30/2024     2:02 PM   Patient Reported Additional Medications   Patient reports taking the following new medications NA     Are you in the first 12 months of your Medicare coverage?  No    Healthy Habits:     In general, how would you rate your overall health?  Fair    Frequency of exercise:  6-7 days/week    Duration of exercise:  30-45 minutes    Do you usually eat at least 4 servings of fruit and vegetables a day, include whole grains    & fiber and avoid regularly eating high fat or \"junk\" foods?  No    Taking medications regularly:  Yes    Medication side effects:  None    Ability to successfully perform activities of daily living:  No assistance needed    Home Safety:  No safety concerns identified    Hearing Impairment:  No hearing concerns    In the past 6 months, have you been bothered by leaking of urine?  No    In general, how would you rate your overall mental or emotional health?  Fair    Additional concerns today:  No      Today's PHQ-2 Score:       1/30/2024     2:00 PM   PHQ-2 ( 1999 [employer])   Q1: Little interest or pleasure in doing things 0   Q2: Feeling down, depressed or hopeless 0   PHQ-2 Score 0   Q1: Little interest or pleasure in doing things Not at all   Q2: Feeling down, depressed or hopeless Not at all   PHQ-2 Score 0       Have you ever done Advance Care Planning? (For example, a Health Directive, POLST, or a discussion with a medical provider or your loved ones about your wishes): No, advance care planning information given to patient to review.  Patient declined advance care planning discussion at this time.         Fall risk  Fallen 2 or more times in the past year?: No  Any fall with " injury in the past year?: No    Cognitive Screening   1) Repeat 3 items (Leader, Season, Table)    2) Clock draw: NORMAL  3) 3 item recall: Recalls 3 objects  Results: 3 items recalled: COGNITIVE IMPAIRMENT LESS LIKELY    Mini-CogTM Copyright ALIZA Rojas. Licensed by the author for use in Health system; reprinted with permission ([email or web address]). All rights reserved.      Do you have sleep apnea, excessive snoring or daytime drowsiness? : no    Reviewed and updated as needed this visit by clinical staff   Tobacco  Allergies  Meds              Reviewed and updated as needed this visit by Provider                  Social History     Tobacco Use    Smoking status: Former     Packs/day: 1.50     Years: 30.00     Additional pack years: 0.00     Total pack years: 45.00     Types: Cigarettes     Quit date: 12/10/2002     Years since quittin.1    Smokeless tobacco: Never   Substance Use Topics    Alcohol use: No     Comment: Sober for 31 years             2024     1:53 PM   Alcohol Use   Prescreen: >3 drinks/day or >7 drinks/week? No     Do you have a current opioid prescription? No  Do you use any other controlled substances or medications that are not prescribed by a provider? None          Diabetes Follow-up    How often are you checking your blood sugar? Four or more times daily  Blood sugar testing frequency justification:  Patient modifying lifestyle changes (diet, exercise) with blood sugars  What time of day are you checking your blood sugars (select all that apply)?  Before and after meals  Have you had any blood sugars above 200?  No  Have you had any blood sugars below 70?  No  What symptoms do you notice when your blood sugar is low?  Shaky  What concerns do you have today about your diabetes? None   Do you have any of these symptoms? (Select all that apply)  no changes    Now taking Lantus 14 units daily.  Needs to change to  humalog due to insurance.       BP Readings from Last 2 Encounters:    01/30/24 126/72   09/27/23 (!) 158/71     Hemoglobin A1C (%)   Date Value   06/14/2023 7.1 (H)   12/14/2022 7.0 (H)   05/12/2021 7.2 (H)   09/30/2020 6.9 (H)     LDL Cholesterol Calculated (mg/dL)   Date Value   12/14/2022 93   09/22/2021 82   09/30/2020 96   10/29/2019 98             MS - denies any changes or concerns.     Chondrosarcoma - denies any concerns. Has not seen Dr. Gage for several years. Does not have questions.     Hyperlipidemia Follow-Up    Are you regularly taking any medication or supplement to lower your cholesterol?   Yes- simvastatin  Are you having muscle aches or other side effects that you think could be caused by your cholesterol lowering medication?  No    Current providers sharing in care for this patient include:   Patient Care Team:  Melita Painter MD as PCP - General (Family Practice)  Melita Painter MD as Assigned PCP  Payam Gage MD as MD (Orthopedics)  Isai Kathleen MD as MD (Neurology)  Jairo Perry MD as MD (Surgery)  Jairo Perry MD as Assigned Surgical Provider    The following health maintenance items are reviewed in Epic and correct as of today:  Health Maintenance   Topic Date Due    RSV VACCINE (Pregnancy & 60+) (1 - 1-dose 60+ series) Never done    MEDICARE ANNUAL WELLNESS VISIT  12/14/2023    A1C  12/14/2023    CMP  12/14/2023    LIPID  12/14/2023    DIABETIC FOOT EXAM  12/14/2023    MICROALBUMIN  06/14/2024    ANNUAL REVIEW OF HM ORDERS  06/14/2024    COLORECTAL CANCER SCREENING  06/20/2024    EYE EXAM  08/24/2024    Pneumococcal Vaccine: Pediatrics (0 to 5 Years) and At-Risk Patients (6 to 64 Years) (3 of 3 - PPSV23 or PCV20) 09/25/2024    ADVANCE CARE PLANNING  12/14/2027    DTAP/TDAP/TD IMMUNIZATION (3 - Td or Tdap) 12/14/2032    HEPATITIS C SCREENING  Completed    PHQ-2 (once per calendar year)  Completed    INFLUENZA VACCINE  Completed    COVID-19 Vaccine  Completed    IPV IMMUNIZATION  Aged Out    HPV IMMUNIZATION  Aged  "Out    MENINGITIS IMMUNIZATION  Aged Out    RSV MONOCLONAL ANTIBODY  Aged Out    BMP  Discontinued    HIV SCREENING  Discontinued    ZOSTER IMMUNIZATION  Discontinued     BP Readings from Last 3 Encounters:   01/30/24 126/72   09/27/23 (!) 158/71   06/14/23 132/68    Wt Readings from Last 3 Encounters:   01/30/24 89.4 kg (197 lb 3.2 oz)   06/14/23 85.5 kg (188 lb 6.4 oz)   12/14/22 85 kg (187 lb 8 oz)                        Review of Systems   Constitutional:  Negative for chills and fever.   HENT:  Negative for congestion, ear pain, hearing loss and sore throat.    Eyes:  Negative for pain and visual disturbance.   Respiratory:  Negative for cough and shortness of breath.    Cardiovascular:  Negative for chest pain and palpitations.   Gastrointestinal:  Negative for abdominal pain, constipation, diarrhea and nausea.   Genitourinary:  Positive for frequency and urgency. Negative for dysuria, genital sores, hematuria and penile discharge.   Musculoskeletal:  Positive for arthralgias and myalgias. Negative for joint swelling.   Skin:  Negative for rash.   Neurological:  Negative for dizziness, weakness and headaches.   Psychiatric/Behavioral:  The patient is nervous/anxious.           OBJECTIVE:   /72   Pulse 77   Temp 98  F (36.7  C) (Temporal)   Ht 1.88 m (6' 2.02\")   Wt 89.4 kg (197 lb 3.2 oz)   SpO2 99%   BMI 25.31 kg/m     Estimated body mass index is 25.31 kg/m  as calculated from the following:    Height as of this encounter: 1.88 m (6' 2.02\").    Weight as of this encounter: 89.4 kg (197 lb 3.2 oz).  Physical Exam  GENERAL: alert and no distress  EYES: Eyes grossly normal to inspection, PERRL and conjunctivae and sclerae normal  HENT: ear canals and TM's normal, nose and mouth without ulcers or lesions  NECK: no adenopathy, no asymmetry, masses, or scars  RESP: lungs clear to auscultation - no rales, rhonchi or wheezes  CV: regular rate and rhythm, normal S1 S2, no S3 or S4, no murmur, click or " rub, no peripheral edema  ABDOMEN: soft, nontender, no hepatosplenomegaly, no masses and bowel sounds normal  MS: no gross musculoskeletal defects noted, no edema  SKIN: no suspicious lesions or rashes  NEURO: Normal strength and tone, mentation intact and speech normal  PSYCH: mentation appears normal, affect normal/bright  LYMPH: no cervical, supraclavicular, axillary, or inguinal adenopathy    Diagnostic Test Results:  Labs reviewed in Epic    ASSESSMENT / PLAN:   Encounter for Medicare annual wellness exam  See counseling messages     Type 2 diabetes mellitus without complication, with long-term current use of insulin (H)  Awaiting results. Dose adjustment as needed.    - HEMOGLOBIN A1C; Future  - COMPREHENSIVE METABOLIC PANEL; Future  - Lipid panel reflex to direct LDL Non-fasting; Future  - blood glucose (NO BRAND SPECIFIED) test strip; Use to test blood sugar 4 times daily or as directed. To accompany: Blood Glucose Monitor Brands: per insurance.  - insulin pen needle (31G X 5 MM) 31G X 5 MM miscellaneous; Use 3 pen needles daily or as directed.  - insulin lispro (HUMALOG KWIKPEN) 100 UNIT/ML (1 unit dial) KWIKPEN; Inject 9 Units Subcutaneous 3 times daily (before meals)  - OFFICE/OUTPT VISIT,EST,LEVL IV  - HEMOGLOBIN A1C  - COMPREHENSIVE METABOLIC PANEL  - Lipid panel reflex to direct LDL Non-fasting    Multiple sclerosis (H)  No changes.   He will reach out for changes or concerns.   Last visit with neuro was 2019. Has not felt the need to return.   - OFFICE/OUTPT VISIT,EST,LEVL IV    Chondrosarcoma (H)  No changes. Knows he can reach out to Dr. Gage if needed.     Hyperlipidemia LDL goal <100  Will notify of results.     Patient has been advised of split billing requirements and indicates understanding: Yes      Counseling  Reviewed preventive health counseling, as reflected in patient instructions       Regular exercise       Healthy diet/nutrition        He reports that he quit smoking about 21 years  ago. His smoking use included cigarettes. He has a 45 pack-year smoking history. He has never used smokeless tobacco.      Appropriate preventive services were discussed with this patient, including applicable screening as appropriate for fall prevention, nutrition, physical activity, Tobacco-use cessation, weight loss and cognition.  Checklist reviewing preventive services available has been given to the patient.    Reviewed patients plan of care and provided an AVS. The Basic Care Plan (routine screening as documented in Health Maintenance) for Aden meets the Care Plan requirement. This Care Plan has been established and reviewed with the Patient.          Signed Electronically by: Melita Painter MD    Identified Health Risks  I have reviewed Opioid Use Disorder and Substance Use Disorder risk factors and made any needed referrals.

## 2024-01-30 NOTE — PROGRESS NOTES
"The patient was provided with suggestions to help him develop a healthy physical lifestyle.  The patient was counseled and encouraged to consider modifying their diet and eating habits. He was provided with information on recommended healthy diet options.  The patient was provided with suggestions to help him develop a healthy emotional lifestyle.  Answers submitted by the patient for this visit:  Annual Preventive Visit (Submitted on 1/24/2024)  Chief Complaint: Annual Exam:  In general, how would you rate your overall physical health?: fair  Frequency of exercise:: 6-7 days/week  Do you usually eat at least 4 servings of fruit and vegetables a day, include whole grains & fiber, and avoid regularly eating high fat or \"junk\" foods? : No  Taking medications regularly:: Yes  Medication side effects:: None  Activities of Daily Living: no assistance needed  Home safety: no safety concerns identified  Hearing Impairment:: no hearing concerns  In the past 6 months, have you been bothered by leaking of urine?: No  abdominal pain: No  Blood in stool: No  Blood in urine: No  chest pain: No  chills: No  congestion: No  constipation: No  cough: No  diarrhea: No  dizziness: No  ear pain: No  eye pain: No  nervous/anxious: Yes  fever: No  frequency: Yes  genital sores: No  headaches: No  hearing loss: No  heartburn: No  arthralgias: Yes  joint swelling: No  peripheral edema: No  mood changes: Yes  myalgias: Yes  nausea: No  dysuria: No  palpitations: No  Skin sensation changes: No  sore throat: No  urgency: Yes  rash: No  shortness of breath: No  visual disturbance: No  weakness: No  impotence: No  penile discharge: No  In general, how would you rate your overall mental or emotional health?: fair  Additional concerns today:: No  Exercise outside of work (Submitted on 1/24/2024)  Chief Complaint: Annual Exam:  Duration of exercise:: 30-45 minutes    "

## 2024-01-30 NOTE — PATIENT INSTRUCTIONS
Preventive Health Recommendations  Male Ages 50 - 64    Yearly exam:             See your health care provider every year in order to  o   Review health changes.   o   Discuss preventive care.    o   Review your medicines if your doctor has prescribed any.   Have a cholesterol test every 5 years, or more frequently if you are at risk for high cholesterol/heart disease.   Have a diabetes test (fasting glucose) every three years. If you are at risk for diabetes, you should have this test more often.   Have a colonoscopy at age 45, or have a yearly FIT test (stool test). These exams will check for colon cancer.    Talk with your health care provider about whether or not a prostate cancer screening test (PSA) is right for you.  You should be tested each year for STDs (sexually transmitted diseases), if you re at risk.     Shots: Get a flu shot each year. Get a tetanus shot every 10 years.     Nutrition:  Eat at least 5 servings of fruits and vegetables daily.   Eat whole-grain bread, whole-wheat pasta and brown rice instead of white grains and rice.   Get adequate Calcium and Vitamin D.     Lifestyle  Exercise for at least 150 minutes a week (30 minutes a day, 5 days a week). This will help you control your weight and prevent disease.   Limit alcohol to one drink per day.   No smoking.   Wear sunscreen to prevent skin cancer.   See your dentist every six months for an exam and cleaning.   See your eye doctor every 1 to 2 years.    Patient Education   Personalized Prevention Plan  You are due for the preventive services outlined below.  Your care team is available to assist you in scheduling these services.  If you have already completed any of these items, please share that information with your care team to update in your medical record.  Health Maintenance Due   Topic Date Due     RSV VACCINE (Pregnancy & 60+) (1 - 1-dose 60+ series) Never done     Annual Wellness Visit  12/14/2023     A1C Lab  12/14/2023      Comprehensive Metabolic Panel  12/14/2023     Cholesterol Lab  12/14/2023     Diabetic Foot Exam  12/14/2023     Your Health Risk Assessment indicates you feel you are not in good health    A healthy lifestyle helps keep the body fit and the mind alert. It helps protect you from disease, helps you fight disease, and helps prevent chronic disease (disease that doesn't go away) from getting worse. This is important as you get older and begin to notice twinges in muscles and joints and a decline in the strength and stamina you once took for granted. A healthy lifestyle includes good healthcare, good nutrition, weight control, recreation, and regular exercise. Avoid harmful substances and do what you can to keep safe. Another part of a healthy lifestyle is stay mentally active and socially involved.    Good healthcare   Have a wellness visit every year.   If you have new symptoms, let us know right away. Don't wait until the next checkup.   Take medicines exactly as prescribed and keep your medicines in a safe place. Tell us if your medicine causes problems.   Healthy diet and weight control   Eat 3 or 4 small, nutritious, low-fat, high-fiber meals a day. Include a variety of fruits, vegetables, and whole-grain foods.   Make sure you get enough calcium in your diet. Calcium, vitamin D, and exercise help prevent osteoporosis (bone thinning).   If you live alone, try eating with others when you can. That way you get a good meal and have company while you eat it.   Try to keep a healthy weight. If you eat more calories than your body uses for energy, it will be stored as fat and you will gain weight.     Recreation   Recreation is not limited to sports and team events. It includes any activity that provides relaxation, interest, enjoyment, and exercise. Recreation provides an outlet for physical, mental, and social energy. It can give a sense of worth and achievement. It can help you stay healthy.    Mental Exercise and  Social Involvement  Mental and emotional health is as important as physical health. Keep in touch with friends and family. Stay as active as possible. Continue to learn and challenge yourself.   Things you can do to stay mentally active are:  Learn something new, like a foreign language or musical instrument.   Play SCRABBLE or do crossword puzzles. If you cannot find people to play these games with you at home, you can play them with others on your computer through the Internet.   Join a games club--anything from card games to chess or checkers or lawn bowling.   Start a new hobby.   Go back to school.   Volunteer.   Read.   Keep up with world events.  Learning About Dietary Guidelines  What are the Dietary Guidelines for Americans?     Dietary Guidelines for Americans provide tips for eating well and staying healthy. This helps reduce the risk for long-term (chronic) diseases.  These guidelines recommend that you:  Eat and drink the right amount for you. The U.[place] government's food guide is called MyPlate. It can help you make your own well-balanced eating plan.  Try to balance your eating with your activity. This helps you stay at a healthy weight.  Drink alcohol in moderation, if at all.  Limit foods high in salt, saturated fat, trans fat, and added sugar.  These guidelines are from the U.S. Department of Agriculture and the U.S. Department of Health and Human Services. They are updated every 5 years.  What is MyPlate?  MyPlate is the U.[place] government's food guide. It can help you make your own well-balanced eating plan. A balanced eating plan means that you eat enough, but not too much, and that your food gives you the nutrients you need to stay healthy.  MyPlate focuses on eating plenty of whole grains, fruits, and vegetables, and on limiting fat and sugar. It is available online at www.ChooseMyPlate.gov.  How can you get started?  If you're trying to eat healthier, you can slowly change your eating habits over  "time. You don't have to make big changes all at once. Start by adding one or two healthy foods to your meals each day.  Grains  Choose whole-grain breads and cereals and whole-wheat pasta and whole-grain crackers.  Vegetables  Eat a variety of vegetables every day. They have lots of nutrients and are part of a heart-healthy diet.  Fruits  Eat a variety of fruits every day. Fruits contain lots of nutrients. Choose fresh fruit instead of fruit juice.  Protein foods  Choose fish and lean poultry more often. Eat red meat and fried meats less often. Dried beans, tofu, and nuts are also good sources of protein.  Dairy  Choose low-fat or fat-free products from this food group. If you have problems digesting milk, try eating cheese or yogurt instead.  Fats and oils  Limit fats and oils if you're trying to cut calories. Choose healthy fats when you cook. These include canola oil and olive oil.  Where can you learn more?  Go to https://www.Woto.net/patiented  Enter D676 in the search box to learn more about \"Learning About Dietary Guidelines.\"  Current as of: February 28, 2023               Content Version: 13.8    2623-5508 [employer].   Care instructions adapted under license by your healthcare professional. If you have questions about a medical condition or this instruction, always ask your healthcare professional. [employer] disclaims any warranty or liability for your use of this information.      Your Health Risk Assessment indicates you feel you are not in good emotional health.    Recreation   Recreation is not limited to sports and team events. It includes any activity that provides relaxation, interest, enjoyment, and exercise. Recreation provides an outlet for physical, mental, and social energy. It can give a sense of worth and achievement. It can help you stay healthy.    Mental Exercise and Social Involvement  Mental and emotional health is as important as physical health. Keep " in touch with friends and family. Stay as active as possible. Continue to learn and challenge yourself.   Things you can do to stay mentally active are:  Learn something new, like a foreign language or musical instrument.   Play SCRABBLE or do crossword puzzles. If you cannot find people to play these games with you at home, you can play them with others on your computer through the Internet.   Join a games club--anything from card games to chess or checkers or lawn bowling.   Start a new hobby.   Go back to school.   Volunteer.   Read.   Keep up with world events.

## 2024-01-31 DIAGNOSIS — Z79.4 TYPE 2 DIABETES MELLITUS WITHOUT COMPLICATION, WITH LONG-TERM CURRENT USE OF INSULIN (H): Primary | ICD-10-CM

## 2024-01-31 DIAGNOSIS — E11.9 TYPE 2 DIABETES MELLITUS WITHOUT COMPLICATION, WITH LONG-TERM CURRENT USE OF INSULIN (H): Primary | ICD-10-CM

## 2024-01-31 LAB
ALBUMIN SERPL BCG-MCNC: 4.3 G/DL (ref 3.5–5.2)
ALP SERPL-CCNC: 76 U/L (ref 40–150)
ALT SERPL W P-5'-P-CCNC: 26 U/L (ref 0–70)
ANION GAP SERPL CALCULATED.3IONS-SCNC: 10 MMOL/L (ref 7–15)
AST SERPL W P-5'-P-CCNC: 31 U/L (ref 0–45)
BILIRUB SERPL-MCNC: 0.4 MG/DL
BUN SERPL-MCNC: 15.2 MG/DL (ref 8–23)
CALCIUM SERPL-MCNC: 9.6 MG/DL (ref 8.8–10.2)
CHLORIDE SERPL-SCNC: 99 MMOL/L (ref 98–107)
CHOLEST SERPL-MCNC: 160 MG/DL
CREAT SERPL-MCNC: 0.91 MG/DL (ref 0.67–1.17)
DEPRECATED HCO3 PLAS-SCNC: 26 MMOL/L (ref 22–29)
EGFRCR SERPLBLD CKD-EPI 2021: >90 ML/MIN/1.73M2
FASTING STATUS PATIENT QL REPORTED: NORMAL
GLUCOSE SERPL-MCNC: 228 MG/DL (ref 70–99)
HDLC SERPL-MCNC: 44 MG/DL
LDLC SERPL CALC-MCNC: 100 MG/DL
NONHDLC SERPL-MCNC: 116 MG/DL
POTASSIUM SERPL-SCNC: 4.7 MMOL/L (ref 3.4–5.3)
PROT SERPL-MCNC: 7.2 G/DL (ref 6.4–8.3)
SODIUM SERPL-SCNC: 135 MMOL/L (ref 135–145)
TRIGL SERPL-MCNC: 79 MG/DL

## 2024-02-01 ENCOUNTER — TELEPHONE (OUTPATIENT)
Dept: FAMILY MEDICINE | Facility: CLINIC | Age: 64
End: 2024-02-01
Payer: MEDICARE

## 2024-02-01 NOTE — TELEPHONE ENCOUNTER
Prior Authorization Retail Medication Request    Medication/Dose: Insulin Lispro 100 unit/mL Pen  Diagnosis and ICD code (if different than what is on RX):    New/renewal/insurance change PA/secondary ins. PA:  Previously Tried and Failed:    Rationale:      Insurance   Primary: MEDICARE - MEDICARE   Insurance ID:  [id number]      Secondary (if applicable):[employer] - [employer] MEDICARE SUPP SR HEALTH   Insurance ID:    [id number]     Pharmacy Information (if different than what is on RX)  Name:    Phone:    Fax:

## 2024-02-14 NOTE — TELEPHONE ENCOUNTER
Central Prior Authorization Team   Phone: [phone]    PA Initiation    Medication: Insulin Lispro 100 unit/mL Pen  Insurance Company: [employer] Non-Specialty PA's - Phone [phone] Fax [phone]  Pharmacy Filling the Rx: [id number] - [place], [place] - [address]  Filling Pharmacy Phone: [phone]  Filling Pharmacy Fax:    Start Date: 2/14/2024

## 2024-02-16 NOTE — TELEPHONE ENCOUNTER
Prior Authorization Approval    Authorization Effective Date: 1/15/2024  Authorization Expiration Date: 2/13/2025  Medication: Insulin Lispro 100 unit/mL Pen  Approved Dose/Quantity:   Reference #:     Insurance Company: [employer] Non-Specialty PA's - Phone [phone] Fax [phone]  Expected CoPay:       CoPay Card Available:      Foundation Assistance Needed:    Which Pharmacy is filling the prescription (Not needed for infusion/clinic administered): [id number] - [place], [place] - [address]  Pharmacy Notified:  yes  Patient Notified:  yes- Pharmacy will contact patient when ready to /ship

## 2024-03-04 NOTE — PROGRESS NOTES
Medication Therapy Management (MTM) Encounter    ASSESSMENT:                            Medication Adherence/Access: No issues identified    Diabetes:   Patient is not meeting A1c goal of < 7%. Discussed benefit for CGM. Patient not sure he wants to wear anything. May benefit from checking blood sugars 2 hours after largest meal. Reported fasting blood sugars within range 80-130mg/dL.    Hypertension:   Stable.    Hyperlipidemia:   Patient may benefit from switching to high intensity statin due to ASCVD risk. Discussed risk vs benefits of high intensity statin and patient declined at this time.    Supplements:   Stable    MS:  Stable    Chondrosarcoma:  Stable.    PLAN:                            Check blood sugars 2 hours after meals    Follow-up: 2-3 weeks via [employer]    SUBJECTIVE/OBJECTIVE:                          Bashir Vaz is a 63 year old male coming in for an initial visit. He was referred to me from Melita Painter      Reason for visit: Blood Sugars.  Humalog is the preferred rapid acting insulin on his insurance  Pen Needle-tech light is a brand insurance will cover  Patient received a letter from his insurance saying he received a jazmyn fill on his pen needles (Techlite brand was not filled at pharmacy) and insulin (lispro was filled at the pharmacy)    Allergies/ADRs: Reviewed in chart  Past Medical History: Reviewed in chart  Tobacco: He reports that he quit smoking about 21 years ago. His smoking use included cigarettes. He has a 45 pack-year smoking history. He has never used smokeless tobacco.  Alcohol: history of alcohol dependence  Other Substance Use: None  Caffeine: pot of coffee/day  Activity: range of motion exercises 30 minutes daily    Medication Adherence/Access: Patient uses pill box(es).  Patient takes medications 0 time(s) per day.   Medication barriers: none.   The patient fills medications at [facility]: [facility]    Diabetes     Lantus 14 units at  bedtime  Lispro 9 units three times daily before meals  If above 100mg/dL will take 9 units of Lispro but if 70-90mg/dL will only take 8 units.  Diagnosed in 1999  when on steroid burst for MS. In 2006 was started on insulin to help bring his blood sugars down more quickly and have more flexibility vs the oral medications he was taking.   Aspirin 81mg daily  Patient is not experiencing side effects.  Blood sugar monitoring: three time(s) daily-before breakfast, lunch and dinner; Ranges: (patient reported) Fasting- 80- 90's in the morning. Does not have his log book with him today.  Current diabetes symptoms: hypoglycemia difficulty concentrating, fatigue  Diet/Exercise: Breakfast-cup of coffee, bagel with whipped cream cheese and glass of milk  Lunch-sandwich chips or pretzels, apple, diet soda  Supper-varies-every Sunday pork chop, pasta, vegetables; usually eats a meat and a little pasta. Saturday will have hot pockets  Snack-around 3pm 20-25 carbs  Gets up the same time every day, eats breakfast, exercises, meditates and then is on the computer or watching TV before lunch. Sometimes his blood sugar will drop low around 1130am and other times it will be around 150 and doesn't know why it would be different when he is eating the same thing and doing the same activities.      Eye exam is up to date  Foot exam: due  Urine Albumin:   Lab Results   Component Value Date    ALC  06/14/2023      Comment:      Unable to calculate, urine albumin and/or urine creatinine is outside detectable limits.  Microalbuminuria is defined as an albumin:creatinine ratio of 17 to 299 for males and 25 to 299 for females. A ratio of albumin:creatinine of 300 or higher is indicative of overt proteinuria.  Due to biologic variability, positive results should be confirmed by a second, first-morning random or 24-hour timed urine specimen. If there is discrepancy, a third specimen is recommended. When 2 out of 3 results are in the  microalbuminuria range, this is evidence for incipient nephropathy and warrants increased efforts at glucose control, blood pressure control, and institution of therapy with an angiotensin-converting-enzyme (ACE) inhibitor (if the patient can tolerate it).        Lab Results   Component Value Date    A1C 7.6 (H) 01/30/2024       Hypertension     Losartan 25mg daily  Patient reports no current medication side effects  Patient does not self-monitor blood pressure.       BP Readings from Last 3 Encounters:   03/05/24 137/84   01/30/24 126/72   09/27/23 (!) 158/71     Pulse Readings from Last 3 Encounters:   03/05/24 73   01/30/24 77   06/14/23 71       Hyperlipidemia       Simvastatin 40mg daily  Patient reports no significant myalgias or other side effects.  The 10-year ASCVD risk score (Rigo RAMIREZ, et al., 2019) is: 22.1%    Values used to calculate the score:      Age: 63 years      Sex: Male      Is Non- : No      Diabetic: Yes      Tobacco smoker: No      Systolic Blood Pressure: 137 mmHg      Is BP treated: No      HDL Cholesterol: 44 mg/dL      Total Cholesterol: 160 mg/dL       Recent Labs   Lab Test 01/30/24  1518 12/14/22  1532   CHOL 160 154   HDL 44 44    93   TRIG 79 86     Supplements:   Vitamin D 2000 units daily  Calcium + Vitamin D 600-200 daily  No reported issues at this time.     MS:   Currently not on any therapy. Has not had a flare in a long time.    Chondrosarcoma:  Currently not on any therapy. History below from ortho note.     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 Tavo Gomez.   March 12, 2008, removal of fractured left pelvis allograft and reimplantation of second left hemipelvis allograft with internal fixation and revision left total hip arthroplasty, Dr Gage  9/25/2015, Revision failed L hemipelvis allograft/MINNIE composite to custom R hemipelvis and MINNIE prosthesis (Biomet pelvis, vit E poly liner, CoChr 36mm +9  head) with Sacral ala and pubic symphysis fixation.     Patient talked a lot today about all of his conditions and the bleak outlook he was given with both his MS and cancer diagnosis. It can be difficult for him to see the importance of managing his blood sugars or changing therapy with everything he has had to navigate through.     Today's Vitals: /84   Pulse 73   ----------------      I spent 60 minutes with this patient today. All changes were made via collaborative practice agreement with Melita Painter A copy of the visit note was provided to the patient's provider(s).    A summary of these recommendations was sent via [employer].    Concepción Ga, Pharm.D, [facility]  Medication Therapy Management Pharmacist           Medication Therapy Recommendations  Hyperlipidemia LDL goal <100    Current Medication: simvastatin (ZOCOR) 80 MG tablet   Rationale: More effective medication available - Ineffective medication - Effectiveness   Recommendation: Change Medication   Status: Declined per Patient         Type 2 diabetes mellitus without complication, with long-term current use of insulin (H)    Current Medication: blood glucose (CONTOUR TEST) test strip   Rationale: Frequency inappropriate - Dosage too low - Effectiveness   Recommendation: Increase Frequency   Status: Patient Agreed - Adherence/Education

## 2024-03-05 ENCOUNTER — OFFICE VISIT (OUTPATIENT)
Dept: PHARMACY | Facility: CLINIC | Age: 64
End: 2024-03-05
Attending: FAMILY MEDICINE
Payer: COMMERCIAL

## 2024-03-05 VITALS — DIASTOLIC BLOOD PRESSURE: 84 MMHG | HEART RATE: 73 BPM | SYSTOLIC BLOOD PRESSURE: 137 MMHG

## 2024-03-05 DIAGNOSIS — I10 BENIGN ESSENTIAL HYPERTENSION: ICD-10-CM

## 2024-03-05 DIAGNOSIS — Z79.4 TYPE 2 DIABETES MELLITUS WITHOUT COMPLICATION, WITH LONG-TERM CURRENT USE OF INSULIN (H): ICD-10-CM

## 2024-03-05 DIAGNOSIS — G35 MULTIPLE SCLEROSIS (H): Primary | ICD-10-CM

## 2024-03-05 DIAGNOSIS — E11.9 TYPE 2 DIABETES MELLITUS WITHOUT COMPLICATION, WITH LONG-TERM CURRENT USE OF INSULIN (H): ICD-10-CM

## 2024-03-05 DIAGNOSIS — C41.9 CHONDROSARCOMA (H): ICD-10-CM

## 2024-03-05 DIAGNOSIS — E78.5 HYPERLIPIDEMIA LDL GOAL <100: ICD-10-CM

## 2024-03-05 DIAGNOSIS — Z78.9 TAKES DIETARY SUPPLEMENTS: ICD-10-CM

## 2024-03-05 PROCEDURE — 99207 PR NO CHARGE LOS: CPT | Performed by: PHARMACIST

## 2024-03-05 RX ORDER — INSULIN GLARGINE 100 [IU]/ML
14 INJECTION, SOLUTION SUBCUTANEOUS AT BEDTIME
Qty: 15 ML | Refills: 1 | Status: SHIPPED | OUTPATIENT
Start: 2024-03-05

## 2024-03-06 RX ORDER — INSULIN LISPRO 100 [IU]/ML
INJECTION, SOLUTION INTRAVENOUS; SUBCUTANEOUS
Qty: 30 ML | Refills: 3 | Status: SHIPPED | OUTPATIENT
Start: 2024-03-06

## 2024-03-06 NOTE — PATIENT INSTRUCTIONS
"Recommendations from today's MTM visit:                                                    MTM (medication therapy management) is a service provided by a clinical pharmacist designed to help you get the most of out of your medicines.   Today we reviewed what your medicines are for, how to know if they are working, that your medicines are safe and how to make your medicine regimen as easy as possible.      1.Check blood sugars 2 hours after meals    Follow-up: 2-3 weeks via [employer]    It was great speaking with you today.  I value your experience and would be very thankful for your time in providing feedback in our clinic survey. In the next few days, you may receive an email or text message from [employer] with a link to a survey related to your  clinical pharmacist.\"     To schedule another MTM appointment, please call the clinic directly or you may call the MTM scheduling line at [phone] or toll-free at [phone].     My Clinical Pharmacist's contact information:                                                      Please feel free to contact me with any questions or concerns you have.      Concepción Ga, Pharm.D, BCACP  Medication Therapy Management Pharmacist      "

## 2024-04-26 DIAGNOSIS — Z79.4 TYPE 2 DIABETES MELLITUS WITHOUT COMPLICATION, WITH LONG-TERM CURRENT USE OF INSULIN (H): ICD-10-CM

## 2024-04-26 DIAGNOSIS — E11.9 TYPE 2 DIABETES MELLITUS WITHOUT COMPLICATION, WITH LONG-TERM CURRENT USE OF INSULIN (H): ICD-10-CM

## 2024-04-26 RX ORDER — CARVEDILOL 25 MG/1
TABLET, FILM COATED ORAL
Qty: 400 STRIP | Refills: 3 | OUTPATIENT
Start: 2024-04-26

## 2024-05-13 ENCOUNTER — TELEPHONE (OUTPATIENT)
Dept: FAMILY MEDICINE | Facility: CLINIC | Age: 64
End: 2024-05-13
Payer: MEDICARE

## 2024-05-13 NOTE — TELEPHONE ENCOUNTER
Forms/Letter Request    Type of form/letter: Diabetic Supplies      Do we have the form/letter: Yes: TC Bin    Who is the form from? Caitlyn (if other please explain)    Where did/will the form come from? form was faxed in    When is form/letter needed by: 5-7 business days     How would you like the form/letter returned: Fax : [phone]    Marylin Hall CMA ([facility])

## 2024-05-16 ENCOUNTER — MEDICAL CORRESPONDENCE (OUTPATIENT)
Dept: HEALTH INFORMATION MANAGEMENT | Facility: CLINIC | Age: 64
End: 2024-05-16
Payer: MEDICARE

## 2024-06-01 DIAGNOSIS — Z96.649 MECHANICAL COMPLICATION OF PROSTHETIC HIP IMPLANT (H): ICD-10-CM

## 2024-06-01 DIAGNOSIS — T84.098A MECHANICAL COMPLICATION OF PROSTHETIC HIP IMPLANT (H): ICD-10-CM

## 2024-06-03 RX ORDER — AMOXICILLIN 500 MG/1
TABLET, FILM COATED ORAL
Qty: 4 TABLET | Refills: 3 | Status: SHIPPED | OUTPATIENT
Start: 2024-06-03

## 2024-07-09 ENCOUNTER — OFFICE VISIT (OUTPATIENT)
Dept: FAMILY MEDICINE | Facility: CLINIC | Age: 64
End: 2024-07-09
Payer: MEDICARE

## 2024-07-09 VITALS
HEIGHT: 74 IN | SYSTOLIC BLOOD PRESSURE: 128 MMHG | TEMPERATURE: 98 F | RESPIRATION RATE: 19 BRPM | OXYGEN SATURATION: 97 % | HEART RATE: 82 BPM | WEIGHT: 187.5 LBS | DIASTOLIC BLOOD PRESSURE: 78 MMHG | BODY MASS INDEX: 24.06 KG/M2

## 2024-07-09 DIAGNOSIS — I10 BENIGN ESSENTIAL HYPERTENSION: ICD-10-CM

## 2024-07-09 DIAGNOSIS — Z12.11 SCREEN FOR COLON CANCER: ICD-10-CM

## 2024-07-09 DIAGNOSIS — E78.5 HYPERLIPIDEMIA LDL GOAL <100: ICD-10-CM

## 2024-07-09 DIAGNOSIS — Z79.4 TYPE 2 DIABETES MELLITUS WITHOUT COMPLICATION, WITH LONG-TERM CURRENT USE OF INSULIN (H): Primary | ICD-10-CM

## 2024-07-09 DIAGNOSIS — E11.9 TYPE 2 DIABETES MELLITUS WITHOUT COMPLICATION, WITH LONG-TERM CURRENT USE OF INSULIN (H): Primary | ICD-10-CM

## 2024-07-09 LAB — HBA1C MFR BLD: 6.6 % (ref 0–5.6)

## 2024-07-09 PROCEDURE — 82043 UR ALBUMIN QUANTITATIVE: CPT | Performed by: FAMILY MEDICINE

## 2024-07-09 PROCEDURE — 36415 COLL VENOUS BLD VENIPUNCTURE: CPT | Performed by: FAMILY MEDICINE

## 2024-07-09 PROCEDURE — 99214 OFFICE O/P EST MOD 30 MIN: CPT | Performed by: FAMILY MEDICINE

## 2024-07-09 PROCEDURE — G2211 COMPLEX E/M VISIT ADD ON: HCPCS | Performed by: FAMILY MEDICINE

## 2024-07-09 PROCEDURE — 82570 ASSAY OF URINE CREATININE: CPT | Performed by: FAMILY MEDICINE

## 2024-07-09 PROCEDURE — 83036 HEMOGLOBIN GLYCOSYLATED A1C: CPT | Performed by: FAMILY MEDICINE

## 2024-07-09 RX ORDER — RESPIRATORY SYNCYTIAL VIRUS VACCINE 120MCG/0.5
0.5 KIT INTRAMUSCULAR ONCE
Qty: 1 EACH | Refills: 0 | Status: CANCELLED | OUTPATIENT
Start: 2024-07-09 | End: 2024-07-09

## 2024-07-09 ASSESSMENT — PAIN SCALES - GENERAL: PAINLEVEL: NO PAIN (0)

## 2024-07-09 NOTE — PROGRESS NOTES
Assessment & Plan     Type 2 diabetes mellitus without complication, with long-term current use of insulin (H)  A1C shows good control at this time.   Patient has adjusted medications and has changed nutrition.   Awaiting urine results.   Continue present plan.   Recheck in 6 months.   - Albumin Random Urine Quantitative with Creat Ratio; Future  - Hemoglobin A1c; Future  - Albumin Random Urine Quantitative with Creat Ratio  - Hemoglobin A1c    Benign essential hypertension  Doing well. Well controlled. Tolerating medication.  No change in plan.      Hyperlipidemia LDL goal <100  Doing well. Well controlled. Tolerating medication.  No change in plan.      Screen for colon cancer  Will notify of results.   - Fecal colorectal cancer screen FIT - Future (S+30); Future  - Fecal colorectal cancer screen FIT - Future (S+30)        Blood sugar testing frequency justification:  Frequent hypoglycemia        Quentin Camejo is a 63 year old, presenting for the following health issues:  Diabetes        7/9/2024     1:45 PM   Additional Questions   Roomed by Trupti DESIR   Accompanied by None         7/9/2024     1:45 PM   Patient Reported Additional Medications   Patient reports taking the following new medications NA     History of Present Illness       Diabetes:   He presents for follow up of diabetes.  He is checking home blood glucose three times daily.   He checks blood glucose before meals.  Blood glucose is sometimes over 200 and sometimes under 70. He is aware of hypoglycemia symptoms including shakiness, weakness and lethargy.    He has no concerns regarding his diabetes at this time.  He is having numbness in feet and excessive thirst.            He eats 0-1 servings of fruits and vegetables daily.He consumes 0 sweetened beverage(s) daily.He exercises with enough effort to increase his heart rate 20 to 29 minutes per day.  He exercises with enough effort to increase his heart rate 7 days per week.   He is taking  "medications regularly.       Diabetes Follow-up      BP Readings from Last 2 Encounters:   07/09/24 128/78   03/05/24 137/84     Hemoglobin A1C (%)   Date Value   01/30/2024 7.6 (H)   06/14/2023 7.1 (H)   05/12/2021 7.2 (H)   09/30/2020 6.9 (H)     LDL Cholesterol Calculated (mg/dL)   Date Value   01/30/2024 100   12/14/2022 93   09/30/2020 96   10/29/2019 98           Hyperlipidemia Follow-Up    Are you regularly taking any medication or supplement to lower your cholesterol?   Yes- simvastatin  Are you having muscle aches or other side effects that you think could be caused by your cholesterol lowering medication?  No    Hypertension Follow-up    Do you check your blood pressure regularly outside of the clinic? Yes   Are you following a low salt diet? Yes  Are your blood pressures ever more than 140 on the top number (systolic) OR more   than 90 on the bottom number (diastolic), for example 140/90? No            Objective    /78   Pulse 82   Temp 98  F (36.7  C) (Temporal)   Resp 19   Ht 1.88 m (6' 2.02\")   Wt 85 kg (187 lb 8 oz)   SpO2 97%   BMI 24.06 kg/m    Body mass index is 24.06 kg/m .  Physical Exam   GENERAL: alert and no distress  NECK: no adenopathy, no asymmetry, masses, or scars  RESP: lungs clear to auscultation - no rales, rhonchi or wheezes  CV: regular rate and rhythm, normal S1 S2, no S3 or S4, no murmur, click or rub, no peripheral edema  MS: no gross musculoskeletal defects noted, no edema  Diabetic foot exam: normal DP and PT pulses, no trophic changes or ulcerative lesions, normal sensory exam, and normal monofilament exam            Signed Electronically by: Melita Painter MD    "

## 2024-07-10 LAB
CREAT UR-MCNC: 29.8 MG/DL
MICROALBUMIN UR-MCNC: <12 MG/L
MICROALBUMIN/CREAT UR: NORMAL MG/G{CREAT}

## 2024-07-13 PROCEDURE — 82274 ASSAY TEST FOR BLOOD FECAL: CPT | Performed by: FAMILY MEDICINE

## 2024-07-16 LAB — HEMOCCULT STL QL IA: NEGATIVE

## 2024-07-24 DIAGNOSIS — Z79.4 TYPE 2 DIABETES MELLITUS WITHOUT COMPLICATION, WITH LONG-TERM CURRENT USE OF INSULIN (H): ICD-10-CM

## 2024-07-24 DIAGNOSIS — E78.5 HYPERLIPIDEMIA LDL GOAL <100: ICD-10-CM

## 2024-07-24 DIAGNOSIS — E11.9 TYPE 2 DIABETES MELLITUS WITHOUT COMPLICATION, WITH LONG-TERM CURRENT USE OF INSULIN (H): ICD-10-CM

## 2024-07-24 RX ORDER — LOSARTAN POTASSIUM 25 MG/1
TABLET ORAL
Qty: 90 TABLET | Refills: 1 | Status: SHIPPED | OUTPATIENT
Start: 2024-07-24

## 2024-07-24 RX ORDER — SIMVASTATIN 80 MG
TABLET ORAL
Qty: 45 TABLET | Refills: 1 | Status: SHIPPED | OUTPATIENT
Start: 2024-07-24

## 2024-08-29 ENCOUNTER — TRANSFERRED RECORDS (OUTPATIENT)
Dept: HEALTH INFORMATION MANAGEMENT | Facility: CLINIC | Age: 64
End: 2024-08-29
Payer: MEDICARE

## 2024-10-16 ENCOUNTER — IMMUNIZATION (OUTPATIENT)
Dept: FAMILY MEDICINE | Facility: CLINIC | Age: 64
End: 2024-10-16
Payer: MEDICARE

## 2024-10-16 DIAGNOSIS — Z23 ENCOUNTER FOR IMMUNIZATION: Primary | ICD-10-CM

## 2024-10-16 PROCEDURE — 91320 SARSCV2 VAC 30MCG TRS-SUC IM: CPT

## 2024-10-16 PROCEDURE — 99207 PR NO CHARGE NURSE ONLY: CPT

## 2024-10-16 PROCEDURE — 90662 IIV NO PRSV INCREASED AG IM: CPT

## 2024-10-16 PROCEDURE — 90480 ADMN SARSCOV2 VAC 1/ONLY CMP: CPT

## 2024-10-16 PROCEDURE — G0008 ADMIN INFLUENZA VIRUS VAC: HCPCS

## 2024-10-16 NOTE — PROGRESS NOTES
Prior to immunization administration, verified patients identity using patient s name and date of birth. Please see Immunization Activity for additional information.     Is the patient's temperature normal (100.5 or less)? Yes     Patient MEETS CRITERIA. PROCEED with vaccine administration.      Patient instructed to remain in clinic for 15 minutes afterwards, and to report any adverse reactions.      Link to Ancillary Visit Immunization Standing Orders SmartSet     Screening performed by Pham Painter MA on 10/16/2024 at 2:42 PM.      Pham Painter MA    Prior to immunization administration, verified patients identity using patient s name and date of birth. Please see Immunization Activity for additional information.     Is the patient's temperature normal (100.5 or less)? Yes     Patient MEETS CRITERIA. PROCEED with vaccine administration.          10/16/2024   COVID   Have you had myocarditis or pericarditis (inflammation of or around the heart muscle) after getting a COVID-19 vaccine? No   Have you had a serious reaction to a COVID vaccine or something in a COVID vaccine, like polyethylene glycol (PEG) or polysorbate? No   Have you had multisystem inflammatory syndrome from COVID-19 in the past 90 days? No   Have you received a bone marrow transplant within the previous 3 months? No            Patient MEETS CRITERIA. PROCEED with vaccine administration.            10/16/2024   INFLUENZA   Would you like to receive the flu shot or the nasal flu vaccine today? Flu Shot   Have you had a serious reaction to a flu vaccine or something in a flu vaccine? No   Have you had Guillain-Elberta syndrome within 6 weeks of getting a vaccine? No   Have you received a bone marrow transplant within the previous 6 months? No            Patient MEETS CRITERIA. PROCEED with vaccine administration.        Patient instructed to remain in clinic for 15 minutes afterwards, and to report any adverse reactions.      Link  to Ancillary Visit Immunization Standing Orders SmartSet     Screening performed by Pham Painter MA on 10/16/2024 at 2:44 PM.

## 2024-12-09 DIAGNOSIS — Z79.4 TYPE 2 DIABETES MELLITUS WITHOUT COMPLICATION, WITH LONG-TERM CURRENT USE OF INSULIN (H): ICD-10-CM

## 2024-12-09 DIAGNOSIS — E11.9 TYPE 2 DIABETES MELLITUS WITHOUT COMPLICATION, WITH LONG-TERM CURRENT USE OF INSULIN (H): ICD-10-CM

## 2024-12-09 RX ORDER — INSULIN GLARGINE 100 [IU]/ML
14 INJECTION, SOLUTION SUBCUTANEOUS AT BEDTIME
Qty: 15 ML | Refills: 1 | Status: SHIPPED | OUTPATIENT
Start: 2024-12-09

## 2025-01-06 DIAGNOSIS — E11.9 TYPE 2 DIABETES MELLITUS WITHOUT COMPLICATION, WITH LONG-TERM CURRENT USE OF INSULIN (H): ICD-10-CM

## 2025-01-06 DIAGNOSIS — Z79.4 TYPE 2 DIABETES MELLITUS WITHOUT COMPLICATION, WITH LONG-TERM CURRENT USE OF INSULIN (H): ICD-10-CM

## 2025-01-06 RX ORDER — FLURBIPROFEN SODIUM 0.3 MG/ML
SOLUTION/ DROPS OPHTHALMIC
Qty: 100 EACH | Refills: 1 | Status: SHIPPED | OUTPATIENT
Start: 2025-01-06

## 2025-01-06 NOTE — TELEPHONE ENCOUNTER
Patient is needing needle prescription change to BD brand of insulin needles.     Patient will be out of needles by the end of the week.     Adelaide Shelby, RN, BSN

## 2025-01-25 DIAGNOSIS — E11.9 TYPE 2 DIABETES MELLITUS WITHOUT COMPLICATION, WITH LONG-TERM CURRENT USE OF INSULIN (H): ICD-10-CM

## 2025-01-25 DIAGNOSIS — E78.5 HYPERLIPIDEMIA LDL GOAL <100: ICD-10-CM

## 2025-01-25 DIAGNOSIS — Z79.4 TYPE 2 DIABETES MELLITUS WITHOUT COMPLICATION, WITH LONG-TERM CURRENT USE OF INSULIN (H): ICD-10-CM

## 2025-01-27 RX ORDER — LOSARTAN POTASSIUM 25 MG/1
TABLET ORAL
Qty: 90 TABLET | Refills: 0 | Status: SHIPPED | OUTPATIENT
Start: 2025-01-27

## 2025-01-27 RX ORDER — SIMVASTATIN 80 MG
TABLET ORAL
Qty: 45 TABLET | Refills: 0 | Status: SHIPPED | OUTPATIENT
Start: 2025-01-27

## 2025-01-29 ENCOUNTER — TELEPHONE (OUTPATIENT)
Dept: FAMILY MEDICINE | Facility: CLINIC | Age: 65
End: 2025-01-29
Payer: MEDICARE

## 2025-01-29 DIAGNOSIS — E11.9 TYPE 2 DIABETES MELLITUS WITHOUT COMPLICATION, WITH LONG-TERM CURRENT USE OF INSULIN (H): Primary | ICD-10-CM

## 2025-01-29 DIAGNOSIS — Z79.4 TYPE 2 DIABETES MELLITUS WITHOUT COMPLICATION, WITH LONG-TERM CURRENT USE OF INSULIN (H): Primary | ICD-10-CM

## 2025-01-29 NOTE — TELEPHONE ENCOUNTER
Message to Prescriber    HUMALOG KWIKPEN 100 UNIT/ML [facility]     Pharmacy Message: Insurance would like to cover novolog, they are being difficult with humalog.    Dave Skelton on 1/29/2025 at 10:51 AM

## 2025-03-04 DIAGNOSIS — E11.9 TYPE 2 DIABETES MELLITUS WITHOUT COMPLICATION, WITH LONG-TERM CURRENT USE OF INSULIN (H): ICD-10-CM

## 2025-03-04 DIAGNOSIS — Z79.4 TYPE 2 DIABETES MELLITUS WITHOUT COMPLICATION, WITH LONG-TERM CURRENT USE OF INSULIN (H): ICD-10-CM

## 2025-03-11 ENCOUNTER — TELEPHONE (OUTPATIENT)
Dept: FAMILY MEDICINE | Facility: CLINIC | Age: 65
End: 2025-03-11
Payer: MEDICARE

## 2025-03-14 ENCOUNTER — TELEPHONE (OUTPATIENT)
Dept: FAMILY MEDICINE | Facility: CLINIC | Age: 65
End: 2025-03-14
Payer: MEDICARE

## 2025-03-14 NOTE — TELEPHONE ENCOUNTER
Forms/Letter Request    Type of form/letter: Diabetic Supplies      Do we have the form/letter: Yes:     Who is the form from? Caitlyn (if other please explain)    Where did/will the form come from? form was faxed in    When is form/letter needed by: 5-7 business days     How would you like the form/letter returned: Fax : [phone]    Marylin aHll CMA ([facility])

## 2025-04-27 DIAGNOSIS — E78.5 HYPERLIPIDEMIA LDL GOAL <100: ICD-10-CM

## 2025-04-27 DIAGNOSIS — Z79.4 TYPE 2 DIABETES MELLITUS WITHOUT COMPLICATION, WITH LONG-TERM CURRENT USE OF INSULIN (H): ICD-10-CM

## 2025-04-27 DIAGNOSIS — E11.9 TYPE 2 DIABETES MELLITUS WITHOUT COMPLICATION, WITH LONG-TERM CURRENT USE OF INSULIN (H): ICD-10-CM

## 2025-04-28 RX ORDER — LOSARTAN POTASSIUM 25 MG/1
25 TABLET ORAL
Qty: 90 TABLET | Refills: 0 | Status: SHIPPED | OUTPATIENT
Start: 2025-04-28

## 2025-04-28 RX ORDER — SIMVASTATIN 80 MG
40 TABLET ORAL
Qty: 45 TABLET | Refills: 0 | Status: SHIPPED | OUTPATIENT
Start: 2025-04-28

## 2025-06-10 DIAGNOSIS — E11.9 TYPE 2 DIABETES MELLITUS WITHOUT COMPLICATION, WITH LONG-TERM CURRENT USE OF INSULIN (H): ICD-10-CM

## 2025-06-10 DIAGNOSIS — Z79.4 TYPE 2 DIABETES MELLITUS WITHOUT COMPLICATION, WITH LONG-TERM CURRENT USE OF INSULIN (H): ICD-10-CM

## 2025-06-11 RX ORDER — INSULIN GLARGINE 100 [IU]/ML
INJECTION, SOLUTION SUBCUTANEOUS
Qty: 15 ML | Refills: 2 | Status: SHIPPED | OUTPATIENT
Start: 2025-06-11

## 2025-06-11 RX ORDER — INSULIN ASPART 100 [IU]/ML
INJECTION, SOLUTION INTRAVENOUS; SUBCUTANEOUS
Qty: 15 ML | Refills: 2 | Status: SHIPPED | OUTPATIENT
Start: 2025-06-11

## 2025-06-18 ENCOUNTER — PATIENT OUTREACH (OUTPATIENT)
Dept: CARE COORDINATION | Facility: CLINIC | Age: 65
End: 2025-06-18
Payer: MEDICARE

## 2025-06-19 DIAGNOSIS — Z12.11 COLON CANCER SCREENING: ICD-10-CM

## 2025-06-24 DIAGNOSIS — E11.9 TYPE 2 DIABETES MELLITUS WITHOUT COMPLICATION, WITH LONG-TERM CURRENT USE OF INSULIN (H): ICD-10-CM

## 2025-06-24 DIAGNOSIS — Z79.4 TYPE 2 DIABETES MELLITUS WITHOUT COMPLICATION, WITH LONG-TERM CURRENT USE OF INSULIN (H): ICD-10-CM

## 2025-06-24 RX ORDER — CARVEDILOL 25 MG/1
TABLET, FILM COATED ORAL
Qty: 400 STRIP | Refills: 3 | Status: CANCELLED | OUTPATIENT
Start: 2025-06-24

## 2025-06-24 NOTE — TELEPHONE ENCOUNTER
Called patient and states he picked up strips yesterday. Patient states he still has refills and does not need them. States that has 2 refills left. Patient verbalized understanding and all questions answered.       Aryan Lo RN  [facility]  June 24, 2025

## 2025-07-03 ENCOUNTER — ORDERS ONLY (AUTO-RELEASED) (OUTPATIENT)
Dept: URGENT CARE | Facility: CLINIC | Age: 65
End: 2025-07-03
Payer: MEDICARE

## 2025-07-03 DIAGNOSIS — Z12.11 COLON CANCER SCREENING: ICD-10-CM

## 2025-07-26 DIAGNOSIS — E11.9 TYPE 2 DIABETES MELLITUS WITHOUT COMPLICATION, WITH LONG-TERM CURRENT USE OF INSULIN (H): ICD-10-CM

## 2025-07-26 DIAGNOSIS — Z79.4 TYPE 2 DIABETES MELLITUS WITHOUT COMPLICATION, WITH LONG-TERM CURRENT USE OF INSULIN (H): ICD-10-CM

## 2025-07-26 DIAGNOSIS — E78.5 HYPERLIPIDEMIA LDL GOAL <100: ICD-10-CM

## 2025-07-26 DIAGNOSIS — Z96.649: ICD-10-CM

## 2025-07-26 DIAGNOSIS — T84.098A: ICD-10-CM

## 2025-07-28 RX ORDER — LOSARTAN POTASSIUM 25 MG/1
25 TABLET ORAL DAILY
Qty: 90 TABLET | Refills: 1 | Status: SHIPPED | OUTPATIENT
Start: 2025-07-28

## 2025-07-28 RX ORDER — SIMVASTATIN 80 MG
40 TABLET ORAL DAILY
Qty: 45 TABLET | Refills: 1 | Status: SHIPPED | OUTPATIENT
Start: 2025-07-28

## 2025-07-28 RX ORDER — AMOXICILLIN 500 MG/1
TABLET, FILM COATED ORAL
Qty: 4 TABLET | Refills: 3 | Status: SHIPPED | OUTPATIENT
Start: 2025-07-28

## 2025-07-29 ENCOUNTER — OFFICE VISIT (OUTPATIENT)
Dept: FAMILY MEDICINE | Facility: CLINIC | Age: 65
End: 2025-07-29
Attending: FAMILY MEDICINE
Payer: MEDICARE

## 2025-07-29 VITALS
HEART RATE: 78 BPM | RESPIRATION RATE: 16 BRPM | TEMPERATURE: 97.9 F | OXYGEN SATURATION: 99 % | HEIGHT: 75 IN | BODY MASS INDEX: 23.38 KG/M2 | DIASTOLIC BLOOD PRESSURE: 70 MMHG | SYSTOLIC BLOOD PRESSURE: 130 MMHG | WEIGHT: 188 LBS

## 2025-07-29 DIAGNOSIS — Z79.4 TYPE 2 DIABETES MELLITUS WITHOUT COMPLICATION, WITH LONG-TERM CURRENT USE OF INSULIN (H): Primary | ICD-10-CM

## 2025-07-29 DIAGNOSIS — E78.5 HYPERLIPIDEMIA LDL GOAL <100: ICD-10-CM

## 2025-07-29 DIAGNOSIS — I10 BENIGN ESSENTIAL HYPERTENSION: ICD-10-CM

## 2025-07-29 DIAGNOSIS — E11.9 TYPE 2 DIABETES MELLITUS WITHOUT COMPLICATION, WITH LONG-TERM CURRENT USE OF INSULIN (H): Primary | ICD-10-CM

## 2025-07-29 LAB
EST. AVERAGE GLUCOSE BLD GHB EST-MCNC: 157 MG/DL
HBA1C MFR BLD: 7.1 % (ref 0–5.6)

## 2025-07-29 PROCEDURE — 36415 COLL VENOUS BLD VENIPUNCTURE: CPT | Performed by: FAMILY MEDICINE

## 2025-07-29 PROCEDURE — 82570 ASSAY OF URINE CREATININE: CPT | Performed by: FAMILY MEDICINE

## 2025-07-29 PROCEDURE — 99214 OFFICE O/P EST MOD 30 MIN: CPT | Performed by: FAMILY MEDICINE

## 2025-07-29 PROCEDURE — 3078F DIAST BP <80 MM HG: CPT | Performed by: FAMILY MEDICINE

## 2025-07-29 PROCEDURE — 83036 HEMOGLOBIN GLYCOSYLATED A1C: CPT | Performed by: FAMILY MEDICINE

## 2025-07-29 PROCEDURE — 3075F SYST BP GE 130 - 139MM HG: CPT | Performed by: FAMILY MEDICINE

## 2025-07-29 PROCEDURE — G2211 COMPLEX E/M VISIT ADD ON: HCPCS | Performed by: FAMILY MEDICINE

## 2025-07-29 PROCEDURE — 82043 UR ALBUMIN QUANTITATIVE: CPT | Performed by: FAMILY MEDICINE

## 2025-07-29 PROCEDURE — 3051F HG A1C>EQUAL 7.0%<8.0%: CPT | Performed by: FAMILY MEDICINE

## 2025-07-29 NOTE — PROGRESS NOTES
Assessment & Plan     ASSESSMENT/ORDERS:    ICD-10-CM    1. Type 2 diabetes mellitus without complication, with long-term current use of insulin (H)  E11.9 Albumin Random Urine Quantitative with Creat Ratio    Z79.4 HEMOGLOBIN A1C     Albumin Random Urine Quantitative with Creat Ratio     HEMOGLOBIN A1C      2. Benign essential hypertension  I10       3. Hyperlipidemia LDL goal <100  E78.5           Assessment & Plan  Type 2 diabetes mellitus without complication, with long-term current use of insulin:  Blood sugar levels have been generally stable, but there have been occasional unexplained fluctuations. The patient is currently using Lantus at 14 units and a Flex pen for insulin.  Perform A1c and urine tests. Consider the use of a continuous glucose monitor, but the patient is not ready for it due to discomfort with having something attached to the body. Monitor blood sugar levels with the current method.    Continue with simvastatin daily without change.     Continue with losartan 25 mg daily. No changes.     Recheck in 6 months with annual wellness. Scheduled.     The longitudinal plan of care for the diagnosis(es)/condition(s) as documented were addressed during this visit. Due to the added complexity in care, I will continue to support Bashir in the subsequent management and with ongoing continuity of care.    Blood sugar testing frequency justification:  Adjustment of medication(s)      Quentin Camejo is a 64 year old, presenting for the following health issues:  Diabetes        7/29/2025     1:43 PM   Additional Questions   Roomed by bt   Accompanied by SELF     History of Present Illness       Diabetes:   He presents for follow up of diabetes.  He is checking home blood glucose three times daily.   He checks blood glucose before meals.  Blood glucose is sometimes over 200 and sometimes under 70. He is aware of hypoglycemia symptoms including shakiness and confusion.    He has no concerns regarding his  diabetes at this time.  He is having numbness in feet and excessive thirst.            He eats 2-3 servings of fruits and vegetables daily.He consumes 0 sweetened beverage(s) daily.He exercises with enough effort to increase his heart rate 20 to 29 minutes per day.  He exercises with enough effort to increase his heart rate 7 days per week.   He is taking medications regularly.   Bashir Vaz, age 64, male, presented for a routine 6-month follow-up. Reported that blood sugars have generally been running well, but over the past month has experienced occasional unexplained fluctuations, including intermittent low and high readings (e.g., a recent dinner-time reading of 240 without clear cause, followed by normal readings the next day). Described efforts to maintain a consistent daily routine to help monitor and understand blood sugar trends. Noted that last spring experienced more frequent unexpected low blood sugars, but in recent weeks has had more occasional highs. Expressed interest in continuous glucose monitoring for convenience but dislikes having devices attached to the body and prefers current fingerstick method despite calloused fingertips. Reported no new issues with feet or legs, numbness, or other symptoms in those areas. Described longstanding numbness on the left side since initial diagnosis of multiple sclerosis, with persistent numbness in specific areas of the left side and tingling/tightness in the left foot since prior surgery, attributed to nerve damage. Also noted some tingling in the right foot in recent years, but less pronounced than on the left. No new symptoms or concerns were reported. Medications were recently renewed and are available at the pharmacy. No mention of recent changes in medication regimen. No new symptoms or concerns related to other systems were reported. Annual eye exam is scheduled for September 4, 2025.      Hyperlipidemia Follow-Up    Are you regularly taking any  "medication or supplement to lower your cholesterol?   Yes- simvastatin  Are you having muscle aches or other side effects that you think could be caused by your cholesterol lowering medication?  No    Hypertension Follow-up    Do you check your blood pressure regularly outside of the clinic? Yes   Are you following a low salt diet? Yes  Are your blood pressures ever more than 140 on the top number (systolic) OR more   than 90 on the bottom number (diastolic), for example 140/90? No    BP Readings from Last 2 Encounters:   07/29/25 130/70   01/31/25 130/70               Objective    /70   Pulse 78   Temp 97.9  F (36.6  C) (Temporal)   Resp 16   Ht 1.892 m (6' 2.5\")   Wt 85.3 kg (188 lb)   SpO2 99%   BMI 23.81 kg/m    Body mass index is 23.81 kg/m .  Physical Exam   GENERAL: alert and no distress  NECK: no adenopathy, no asymmetry, masses, or scars  RESP: lungs clear to auscultation - no rales, rhonchi or wheezes  CV: regular rate and rhythm, normal S1 S2, no S3 or S4, no murmur, click or rub, no peripheral edema  MS: no gross musculoskeletal defects noted, no edema  Diabetic foot exam: normal DP and PT pulses, no trophic changes or ulcerative lesions, and diminished sensation over both feet related to MS changes.             Signed Electronically by: Melita Painter MD    "

## 2025-07-30 LAB
CREAT UR-MCNC: 24.5 MG/DL
MICROALBUMIN UR-MCNC: <12 MG/L
MICROALBUMIN/CREAT UR: NORMAL MG/G{CREAT}